# Patient Record
Sex: FEMALE | Race: WHITE | NOT HISPANIC OR LATINO | Employment: OTHER | URBAN - METROPOLITAN AREA
[De-identification: names, ages, dates, MRNs, and addresses within clinical notes are randomized per-mention and may not be internally consistent; named-entity substitution may affect disease eponyms.]

---

## 2022-12-30 ENCOUNTER — APPOINTMENT (OUTPATIENT)
Dept: MRI IMAGING | Facility: CLINIC | Age: 82
DRG: 065 | End: 2022-12-30
Attending: EMERGENCY MEDICINE
Payer: COMMERCIAL

## 2022-12-30 ENCOUNTER — HOSPITAL ENCOUNTER (INPATIENT)
Facility: CLINIC | Age: 82
LOS: 4 days | Discharge: NURSING, REHAB OR LONG-TERM CARE | DRG: 065 | End: 2023-01-03
Attending: EMERGENCY MEDICINE | Admitting: INTERNAL MEDICINE
Payer: COMMERCIAL

## 2022-12-30 DIAGNOSIS — I63.9 CEREBROVASCULAR ACCIDENT (CVA), UNSPECIFIED MECHANISM (H): ICD-10-CM

## 2022-12-30 DIAGNOSIS — E78.5 HYPERLIPIDEMIA LDL GOAL <100: ICD-10-CM

## 2022-12-30 DIAGNOSIS — I10 BENIGN ESSENTIAL HYPERTENSION: Primary | ICD-10-CM

## 2022-12-30 LAB
ANION GAP SERPL CALCULATED.3IONS-SCNC: 7 MMOL/L (ref 3–14)
APTT PPP: 27 SECONDS (ref 22–38)
ATRIAL RATE - MUSE: 84 BPM
BASOPHILS # BLD AUTO: 0.1 10E3/UL (ref 0–0.2)
BASOPHILS NFR BLD AUTO: 1 %
BUN SERPL-MCNC: 19 MG/DL (ref 7–30)
CALCIUM SERPL-MCNC: 9.8 MG/DL (ref 8.5–10.1)
CHLORIDE BLD-SCNC: 104 MMOL/L (ref 94–109)
CHOLEST SERPL-MCNC: 214 MG/DL
CO2 SERPL-SCNC: 24 MMOL/L (ref 20–32)
CREAT SERPL-MCNC: 0.77 MG/DL (ref 0.52–1.04)
DIASTOLIC BLOOD PRESSURE - MUSE: NORMAL MMHG
EOSINOPHIL # BLD AUTO: 0.1 10E3/UL (ref 0–0.7)
EOSINOPHIL NFR BLD AUTO: 2 %
ERYTHROCYTE [DISTWIDTH] IN BLOOD BY AUTOMATED COUNT: 14.2 % (ref 10–15)
GFR SERPL CREATININE-BSD FRML MDRD: 76 ML/MIN/1.73M2
GLUCOSE BLD-MCNC: 118 MG/DL (ref 70–99)
HBA1C MFR BLD: 6 % (ref 0–5.6)
HCT VFR BLD AUTO: 38.2 % (ref 35–47)
HDLC SERPL-MCNC: 93 MG/DL
HGB BLD-MCNC: 13.1 G/DL (ref 11.7–15.7)
IMM GRANULOCYTES # BLD: 0 10E3/UL
IMM GRANULOCYTES NFR BLD: 0 %
INR PPP: 0.89 (ref 0.85–1.15)
INTERPRETATION ECG - MUSE: NORMAL
LDLC SERPL CALC-MCNC: 96 MG/DL
LYMPHOCYTES # BLD AUTO: 1.9 10E3/UL (ref 0.8–5.3)
LYMPHOCYTES NFR BLD AUTO: 26 %
MCH RBC QN AUTO: 29.2 PG (ref 26.5–33)
MCHC RBC AUTO-ENTMCNC: 34.3 G/DL (ref 31.5–36.5)
MCV RBC AUTO: 85 FL (ref 78–100)
MONOCYTES # BLD AUTO: 0.8 10E3/UL (ref 0–1.3)
MONOCYTES NFR BLD AUTO: 11 %
NEUTROPHILS # BLD AUTO: 4.3 10E3/UL (ref 1.6–8.3)
NEUTROPHILS NFR BLD AUTO: 60 %
NONHDLC SERPL-MCNC: 121 MG/DL
NRBC # BLD AUTO: 0 10E3/UL
NRBC BLD AUTO-RTO: 0 /100
P AXIS - MUSE: 63 DEGREES
PLATELET # BLD AUTO: 292 10E3/UL (ref 150–450)
POTASSIUM BLD-SCNC: 4.2 MMOL/L (ref 3.4–5.3)
PR INTERVAL - MUSE: 166 MS
QRS DURATION - MUSE: 84 MS
QT - MUSE: 370 MS
QTC - MUSE: 437 MS
R AXIS - MUSE: 14 DEGREES
RBC # BLD AUTO: 4.48 10E6/UL (ref 3.8–5.2)
SARS-COV-2 RNA RESP QL NAA+PROBE: NEGATIVE
SODIUM SERPL-SCNC: 135 MMOL/L (ref 133–144)
SYSTOLIC BLOOD PRESSURE - MUSE: NORMAL MMHG
T AXIS - MUSE: 77 DEGREES
TRIGL SERPL-MCNC: 127 MG/DL
TROPONIN I SERPL HS-MCNC: 8 NG/L
VENTRICULAR RATE- MUSE: 84 BPM
WBC # BLD AUTO: 7.2 10E3/UL (ref 4–11)

## 2022-12-30 PROCEDURE — 258N000003 HC RX IP 258 OP 636: Performed by: EMERGENCY MEDICINE

## 2022-12-30 PROCEDURE — 96360 HYDRATION IV INFUSION INIT: CPT

## 2022-12-30 PROCEDURE — 255N000002 HC RX 255 OP 636: Performed by: EMERGENCY MEDICINE

## 2022-12-30 PROCEDURE — 83036 HEMOGLOBIN GLYCOSYLATED A1C: CPT | Performed by: NURSE PRACTITIONER

## 2022-12-30 PROCEDURE — 70553 MRI BRAIN STEM W/O & W/DYE: CPT

## 2022-12-30 PROCEDURE — 99221 1ST HOSP IP/OBS SF/LOW 40: CPT | Mod: AI | Performed by: NURSE PRACTITIONER

## 2022-12-30 PROCEDURE — 85610 PROTHROMBIN TIME: CPT | Performed by: EMERGENCY MEDICINE

## 2022-12-30 PROCEDURE — 80061 LIPID PANEL: CPT | Performed by: NURSE PRACTITIONER

## 2022-12-30 PROCEDURE — 70544 MR ANGIOGRAPHY HEAD W/O DYE: CPT

## 2022-12-30 PROCEDURE — U0005 INFEC AGEN DETEC AMPLI PROBE: HCPCS | Performed by: EMERGENCY MEDICINE

## 2022-12-30 PROCEDURE — 93005 ELECTROCARDIOGRAM TRACING: CPT

## 2022-12-30 PROCEDURE — 82310 ASSAY OF CALCIUM: CPT | Performed by: EMERGENCY MEDICINE

## 2022-12-30 PROCEDURE — 85730 THROMBOPLASTIN TIME PARTIAL: CPT | Performed by: EMERGENCY MEDICINE

## 2022-12-30 PROCEDURE — C9803 HOPD COVID-19 SPEC COLLECT: HCPCS

## 2022-12-30 PROCEDURE — 120N000001 HC R&B MED SURG/OB

## 2022-12-30 PROCEDURE — 99285 EMERGENCY DEPT VISIT HI MDM: CPT | Mod: 25

## 2022-12-30 PROCEDURE — 84484 ASSAY OF TROPONIN QUANT: CPT | Performed by: EMERGENCY MEDICINE

## 2022-12-30 PROCEDURE — 85025 COMPLETE CBC W/AUTO DIFF WBC: CPT | Performed by: EMERGENCY MEDICINE

## 2022-12-30 PROCEDURE — 36415 COLL VENOUS BLD VENIPUNCTURE: CPT | Performed by: EMERGENCY MEDICINE

## 2022-12-30 PROCEDURE — A9585 GADOBUTROL INJECTION: HCPCS | Performed by: EMERGENCY MEDICINE

## 2022-12-30 PROCEDURE — 250N000013 HC RX MED GY IP 250 OP 250 PS 637: Performed by: INTERNAL MEDICINE

## 2022-12-30 PROCEDURE — 96361 HYDRATE IV INFUSION ADD-ON: CPT

## 2022-12-30 PROCEDURE — 250N000013 HC RX MED GY IP 250 OP 250 PS 637: Performed by: NURSE PRACTITIONER

## 2022-12-30 PROCEDURE — 70549 MR ANGIOGRAPH NECK W/O&W/DYE: CPT

## 2022-12-30 RX ORDER — LIDOCAINE 40 MG/G
CREAM TOPICAL
Status: DISCONTINUED | OUTPATIENT
Start: 2022-12-30 | End: 2023-01-03 | Stop reason: HOSPADM

## 2022-12-30 RX ORDER — ASPIRIN 81 MG/1
81 TABLET ORAL DAILY
Status: DISCONTINUED | OUTPATIENT
Start: 2022-12-31 | End: 2022-12-30

## 2022-12-30 RX ORDER — GADOBUTROL 604.72 MG/ML
10 INJECTION INTRAVENOUS ONCE
Status: COMPLETED | OUTPATIENT
Start: 2022-12-30 | End: 2022-12-30

## 2022-12-30 RX ORDER — ACETAMINOPHEN 325 MG/1
650 TABLET ORAL EVERY 4 HOURS PRN
Status: DISCONTINUED | OUTPATIENT
Start: 2022-12-30 | End: 2023-01-03 | Stop reason: HOSPADM

## 2022-12-30 RX ORDER — ONDANSETRON 2 MG/ML
4 INJECTION INTRAMUSCULAR; INTRAVENOUS EVERY 6 HOURS PRN
Status: DISCONTINUED | OUTPATIENT
Start: 2022-12-30 | End: 2023-01-03 | Stop reason: HOSPADM

## 2022-12-30 RX ORDER — CLOPIDOGREL BISULFATE 75 MG/1
75 TABLET ORAL DAILY
Status: DISCONTINUED | OUTPATIENT
Start: 2022-12-31 | End: 2022-12-30

## 2022-12-30 RX ORDER — ASPIRIN 81 MG/1
324 TABLET, CHEWABLE ORAL ONCE
Status: DISCONTINUED | OUTPATIENT
Start: 2022-12-30 | End: 2022-12-30

## 2022-12-30 RX ORDER — MULTIVIT WITH MINERALS/LUTEIN
1 TABLET ORAL DAILY
COMMUNITY

## 2022-12-30 RX ORDER — ONDANSETRON 4 MG/1
4 TABLET, ORALLY DISINTEGRATING ORAL EVERY 6 HOURS PRN
Status: DISCONTINUED | OUTPATIENT
Start: 2022-12-30 | End: 2023-01-03 | Stop reason: HOSPADM

## 2022-12-30 RX ORDER — ASPIRIN 81 MG/1
81 TABLET, CHEWABLE ORAL DAILY
Status: ON HOLD | COMMUNITY
End: 2023-01-12

## 2022-12-30 RX ORDER — CLOPIDOGREL BISULFATE 75 MG/1
75 TABLET ORAL DAILY
Status: DISCONTINUED | OUTPATIENT
Start: 2022-12-31 | End: 2023-01-03 | Stop reason: HOSPADM

## 2022-12-30 RX ORDER — PROCHLORPERAZINE 25 MG
12.5 SUPPOSITORY, RECTAL RECTAL EVERY 12 HOURS PRN
Status: DISCONTINUED | OUTPATIENT
Start: 2022-12-30 | End: 2023-01-03 | Stop reason: HOSPADM

## 2022-12-30 RX ORDER — ATORVASTATIN CALCIUM 40 MG/1
40 TABLET, FILM COATED ORAL EVERY EVENING
Status: DISCONTINUED | OUTPATIENT
Start: 2022-12-30 | End: 2022-12-30

## 2022-12-30 RX ORDER — ATORVASTATIN CALCIUM 40 MG/1
40 TABLET, FILM COATED ORAL EVERY EVENING
Status: DISCONTINUED | OUTPATIENT
Start: 2022-12-30 | End: 2022-12-31

## 2022-12-30 RX ORDER — ACETAMINOPHEN 325 MG/1
650 TABLET ORAL EVERY 4 HOURS PRN
Status: DISCONTINUED | OUTPATIENT
Start: 2022-12-30 | End: 2022-12-30

## 2022-12-30 RX ORDER — CHOLECALCIFEROL (VITAMIN D3) 50 MCG
1 TABLET ORAL DAILY
COMMUNITY

## 2022-12-30 RX ORDER — PROCHLORPERAZINE MALEATE 5 MG
5 TABLET ORAL EVERY 6 HOURS PRN
Status: DISCONTINUED | OUTPATIENT
Start: 2022-12-30 | End: 2023-01-03 | Stop reason: HOSPADM

## 2022-12-30 RX ORDER — CLOPIDOGREL 300 MG/1
300 TABLET, FILM COATED ORAL ONCE
Status: COMPLETED | OUTPATIENT
Start: 2022-12-30 | End: 2022-12-30

## 2022-12-30 RX ORDER — ATORVASTATIN CALCIUM 40 MG/1
40 TABLET, FILM COATED ORAL DAILY
Status: ON HOLD | COMMUNITY
End: 2023-01-03

## 2022-12-30 RX ORDER — VIT C/E/ZN/COPPR/LUTEIN/ZEAXAN 60 MG-6 MG
1 CAPSULE ORAL DAILY
COMMUNITY
End: 2023-04-10

## 2022-12-30 RX ORDER — ASPIRIN 81 MG/1
81 TABLET, CHEWABLE ORAL DAILY
Status: DISCONTINUED | OUTPATIENT
Start: 2022-12-31 | End: 2023-01-03 | Stop reason: HOSPADM

## 2022-12-30 RX ADMIN — CLOPIDOGREL BISULFATE 300 MG: 300 TABLET, FILM COATED ORAL at 16:27

## 2022-12-30 RX ADMIN — SODIUM CHLORIDE 1000 ML: 9 INJECTION, SOLUTION INTRAVENOUS at 11:04

## 2022-12-30 RX ADMIN — GADOBUTROL 10 ML: 604.72 INJECTION INTRAVENOUS at 13:52

## 2022-12-30 RX ADMIN — ATORVASTATIN CALCIUM 40 MG: 40 TABLET, FILM COATED ORAL at 20:55

## 2022-12-30 ASSESSMENT — ACTIVITIES OF DAILY LIVING (ADL)
ADLS_ACUITY_SCORE: 35
ADLS_ACUITY_SCORE: 33
ADLS_ACUITY_SCORE: 35

## 2022-12-30 ASSESSMENT — ENCOUNTER SYMPTOMS
SPEECH DIFFICULTY: 0
WEAKNESS: 1

## 2022-12-30 NOTE — CONSULTS
[facility]    Stroke Consult Note    Reason for Consult:  TIA    Chief Complaint: Extremity Weakness       HPI  Inga Coats is a 82 year old female presented with transient right hand weakness, noted to also be dragging right foot while walking. Symptoms lasted over an hour. She feels back to baseline now. She has a history of cardiac risk factors with CAD s/p CABG, HLD and prior TIAs.   Onset 810    TIA Evaluation Summarized    MRI and/or Head CT                                                                  IMPRESSION:    1. Subtle restricted diffusion in the left corona radiata which could  represent an acute to subacute infarct. No evidence of hemorrhagic  transformation of infarct. No mass effect or midline shift.  2. Moderate diffuse parenchymal volume loss and marked white matter  changes most likely due to chronic microvascular ischemic disease.    Intracranial Vasculature IMPRESSION:    1. No definite vascular cutoff of the proximal major intracranial  arteries.  2. Apparent moderate stenosis at the proximal left M2 branches   Cervical Vasculature                                                                  IMPRESSION:    1. Patent arteries in the neck without evidence of dissection.  2. Apparent atherosclerotic disease of the right carotid bifurcation  and proximal internal carotid artery which appears to cause  approximately 50% stenosis by NASCET criteria     Echocardiogram pending   EKG/Telemetry SR   Other Testing Not Applicable      LDL No lab value available in past 30 days   A1C No lab value available in past 90 days       ABCD2 Patients Score   Age ? 60 years 1 point 1   Blood Pressure     -SBP ? 140 or DBP ?  90    1 point 1   Clinical Features    -Unilateral weakness   -Speech disturbance w/o weakness    -Other    2 points  1 point    0 points 2   Duration of symptoms    ?60 minutes    10-59 minutes    <10 minutes   2 points  1 point  0 points 2  "  Diabetes  1 point 0   Patient s ABCD2 Score (0-7) = 6       Impression  Transient ischemic attack- has vascular risk factors with CAD s/p CABG, Aortic stenosis s/p AVR, HLD and prior TIA who presented with transient right hand and foot weakness, now resolved. She has a subacute left corona radiata stroke on MRI and MRA notes some left MCA stenosis that would be consistent with ICAD.     Recommendations  - Neurochecks and Vital Signs every Q4H   - Daily aspirin 81 mg for secondary stroke prevention  - Plavix (clopidogrel) 300 mg PO loading dose x 1  - Plavix (clopidogrel) 75 mg PO Daily  - Statin: Lipitor 40mg, titrate pending on LDL   - TTE (with Bubble Study if age 60 yrs or less)  - 24-hour Telemetry  - Bedside Glucose Monitoring  - A1c, Lipid Panel  - PT/OT/SLP  - Stroke Education  - Euthermia, Euglycemia   - DAPT 90 days    Patient Follow-up    - final recommendation pending work-up  - in 6-8 weeks with any stroke KRISTEN ([phone])    Thank you for this consult. We will continue to follow.     The Stroke Staff is Dr. Carbajal  .    Kaur Alvarenga MD  Vascular Neurology Fellow    To page me or covering stroke neurology team member, click here: [facility]COM  Choose \"On Call\" tab at top, then select \"NEUROLOGY/ALL SITES\" from middle drop-down box, press Enter, then look for \"stroke\" or \"telestroke\" for your site.    _____________________________________________________    Clinically Significant Risk Factors Present on Admission         # Hyponatremia: Lowest Na = 135 mmol/L in last 2 days, will monitor as appropriate                          Past Medical History   History reviewed. No pertinent past medical history.  Past Surgical History   Past Surgical History:   Procedure Laterality Date     CARDIAC SURGERY       Medications   Home Meds  Prior to Admission medications    Medication Sig Start Date End Date Taking? Authorizing Provider   simvastatin (ZOCOR) 40 MG tablet Take 1 tablet by mouth At Bedtime. " 6/28/12   Joe Ramires MD       Scheduled Meds      Infusion Meds      PRN Meds      Allergies   Allergies   Allergen Reactions     Shellfish-Derived Products Anaphylaxis     Family History   History reviewed. No pertinent family history.  Social History   Social History     Tobacco Use     Smoking status: Never     Smokeless tobacco: Never   Substance Use Topics     Alcohol use: Not Currently     Drug use: Never       Review of Systems   Review of systems not obtained due to patient factors - critical condition       PHYSICAL EXAMINATION   Temp:  [98.6  F (37  C)] 98.6  F (37  C)  Pulse:  [87] 87  Resp:  [18] 18  BP: (184)/(85) 184/85  SpO2:  [100 %] 100 %    Neurologic  Mental Status:  alert, oriented x 3, follows commands, speech clear and fluent, naming and repetition normal  Cranial Nerves:  visual fields intact, EOMI with normal smooth pursuit, facial movements symmetric, hearing not formally tested but intact to conversation, palate elevation symmetric and uvula midline, no dysarthria, shoulder shrug strong bilaterally, tongue protrusion midline  Motor:  normal muscle tone and bulk, no abnormal movements, able to move all limbs spontaneously, strength 5/5 throughout upper and lower extremities, no pronator drift  Reflexes:  toes down-going  Sensory:  light touch sensation intact and symmetric throughout upper and lower extremities, no extinction on double simultaneous stimulation   Coordination:  normal finger-to-nose and heel-to-shin bilaterally without dysmetria, rapid alternating movements symmetric  Station/Gait:  deferred    Dysphagia Screen  Per Nursing    Stroke Scales    NIHSS  1a. Level of Consciousness  0   1b. LOC Questions  0   1c. LOC Commands  0   2.   Best Gaze  0   3.   Visual  0   4.   Facial Palsy  0   5a. Motor Arm, Left  0   5b. Motor Arm, Right  0   6a. Motor Leg, Left  0   6b. Motor Leg, right  0   7.   Limb Ataxia  0   8.   Sensory  0   9.   Best Language  0   10.  Dysarthria  0   11. Extinction and Inattention   0   Total  0       Modified Billings Score (Pre-morbid)    -       Imaging  I personally reviewed all imaging; relevant findings per HPI.    Labs Data   CBC  Recent Labs   Lab 12/30/22  1103   WBC 7.2   RBC 4.48   HGB 13.1   HCT 38.2        Basic Metabolic Panel   Recent Labs   Lab 12/30/22  1103      POTASSIUM 4.2   CHLORIDE 104     Liver Panel  No results for input(s): PROTTOTAL, ALBUMIN, BILITOTAL, ALKPHOS, AST, ALT, BILIDIRECT in the last 168 hours.  INR    Recent Labs   Lab Test 12/30/22  1103   INR 0.89      Lipid Profile  No lab results found.  A1C  No lab results found.  Troponin  No results for input(s): CTROPT, TROPONINIS, TROPONINI, GHTROP in the last 168 hours.       Stroke Consult Data Data   This was a non-emergent, non-telestroke consult.

## 2022-12-30 NOTE — ED TRIAGE NOTES
"Pt notes was up at 0400 and was able to do ADLs. Pt noted at approx 0820 rt hand was \"slow to work and when held arm in front it would drop.\" pt Befast negative in triage.      Triage Assessment     Row Name 12/30/22 4786       Triage Assessment (Adult)    Airway WDL WDL       Respiratory WDL    Respiratory WDL WDL       Skin Circulation/Temperature WDL    Skin Circulation/Temperature WDL WDL       Cardiac WDL    Cardiac WDL X  HTN       Peripheral/Neurovascular WDL    Peripheral Neurovascular WDL WDL       Cognitive/Neuro/Behavioral WDL    Cognitive/Neuro/Behavioral WDL X  pt noted rt arm weakness at 0820. at baseline at this time    Orientation oriented x 4       Seamus Coma Scale    Best Eye Response 4-->(E4) spontaneous    Best Motor Response 6-->(M6) obeys commands    Best Verbal Response 5-->(V5) oriented    Seamus Coma Scale Score 15              "

## 2022-12-30 NOTE — ED PROVIDER NOTES
History   Chief Complaint:  Extremity Weakness       The history is provided by the patient.      Inga Coats is a 82 year old female with history of transient cerebral ischemia, coronary artery disease, and aortic valve replacement who presents with right arm/hand weakness. The patient reports feeling fine when she woke up at 0430 this morning, noting that she watched television, made her bed, ate breakfast, and then played [employer] on her phone. While still on her phone around 0810 the patient noticed that her right fingers were not working as well and seemed weak. She says she could work her fingers, but they were slow and changed from baseline. She also notes that she was able to lift the right arm, but then it would fall immediately. Her daughter arrived at 0900 and symptoms were improving. She says the patient was able to get dressed while standing and walk, but notes that her right foot seemed to shuffle more on the ground. The patient states that her full arm weakness is now resolved, but says writing her name still looks slightly different. No speech difficulty. She denies history of stroke, but notes family history. The patient had an aortic valve replacement about 10 years ago. She has not had a recent echo. She has history of hyperlipidemia, but denies hypertension and diabetes mellitus. The patient has never smoked. She reports taking daily baby aspirin.     Review of Systems   Neurological: Positive for weakness. Negative for speech difficulty.   All other systems reviewed and are negative.    Allergies:  Shellfish-Derived Products    Medications:  Atorvastatin  Nitroglycerin  Aspirin 81 mg  Glucosamine HCL    Past Medical History:     Transient cerebral ischemia  Coronary artery disease  Severe aortic stenosis  Hyperlipidemia  CKD stage 3    Past Surgical History:    CABG x3  Aortic valve replacement    Family History:    Heart disease    Social History:  The patient presents to the ED  "with her daughter   Denies any smoking history   PCP: Joe Ramires     Physical Exam     Patient Vitals for the past 24 hrs:   BP Temp Temp src Pulse Resp SpO2 Height Weight   12/30/22 1430 (!) 162/85 -- -- 75 -- -- -- --   12/30/22 1358 (!) 185/87 -- -- -- -- 94 % -- --   12/30/22 1213 (!) 155/72 -- -- 74 -- -- -- --   12/30/22 1113 137/85 -- -- -- -- 95 % -- --   12/30/22 0944 (!) 184/85 98.6  F (37  C) Temporal 87 18 100 % 1.651 m (5' 5\") 61.2 kg (135 lb)       Physical Exam  GENERAL: well developed, pleasant  HEAD: atraumatic  EYES: pupils reactive, extraocular muscles intact, conjunctivae normal  ENT:  mucus membranes moist  NECK:  trachea midline, normal range of motion  RESPIRATORY: no tachypnea, breath sounds clear to auscultation   CVS: normal S1/S2, no murmurs, intact distal pulses  ABDOMEN: soft, nontender, nondistention  MUSCULOSKELETAL: no deformities  SKIN: warm and dry, no acute rashes or ulceration  NEURO: GCS 15, cranial nerves intact, alert and oriented x3, normal gait, normal finger to nose, normal strength  PSYCH:  Mood/affect normal    Emergency Department Course   ECG taken at 1122, read at 1130   ECG results from 12/30/22   EKG 12-lead, tracing only     Value    Systolic Blood Pressure     Diastolic Blood Pressure     Ventricular Rate 84    Atrial Rate 84    TN Interval 166    QRS Duration 84        QTc 437    P Axis 63    R AXIS 14    T Axis 77    Interpretation ECG      Sinus rhythm  Nonspecific ST and T wave abnormality  Abnormal ECG  No previous ECGs available       Imaging:  MRA Angiogram Neck w/o & w Contrast   Preliminary Result   IMPRESSION:     1. Patent arteries in the neck without evidence of dissection.   2. Apparent atherosclerotic disease of the right carotid bifurcation   and proximal internal carotid artery which appears to cause   approximately 50% stenosis by NASCET criteria.      MR Brain w/o & w Contrast   Preliminary Result   IMPRESSION:     1. Subtle " restricted diffusion in the left corona radiata which could   represent an acute to subacute infarct. No evidence of hemorrhagic   transformation of infarct. No mass effect or midline shift.   2. Moderate diffuse parenchymal volume loss and marked white matter   changes most likely due to chronic microvascular ischemic disease.          MR Head w/o Contrast Angiogram   Preliminary Result   IMPRESSION:     1. No definite vascular cutoff of the proximal major intracranial   arteries.   2. Apparent moderate stenosis at the proximal left M2 branches.          Report per radiology      Laboratory:  Labs Ordered and Resulted from Time of ED Arrival to Time of ED Departure   BASIC METABOLIC PANEL - Abnormal       Result Value    Sodium 135      Potassium 4.2      Chloride 104      Carbon Dioxide (CO2) 24      Anion Gap 7      Urea Nitrogen 19      Creatinine 0.77      Calcium 9.8      Glucose 118 (*)     GFR Estimate 76     INR - Normal    INR 0.89     PARTIAL THROMBOPLASTIN TIME - Normal    aPTT 27     TROPONIN I - Normal    Troponin I High Sensitivity 8     CBC WITH PLATELETS AND DIFFERENTIAL    WBC Count 7.2      RBC Count 4.48      Hemoglobin 13.1      Hematocrit 38.2      MCV 85      MCH 29.2      MCHC 34.3      RDW 14.2      Platelet Count 292      % Neutrophils 60      % Lymphocytes 26      % Monocytes 11      % Eosinophils 2      % Basophils 1      % Immature Granulocytes 0      NRBCs per 100 WBC 0      Absolute Neutrophils 4.3      Absolute Lymphocytes 1.9      Absolute Monocytes 0.8      Absolute Eosinophils 0.1      Absolute Basophils 0.1      Absolute Immature Granulocytes 0.0      Absolute NRBCs 0.0     COVID-19 VIRUS (CORONAVIRUS) BY PCR   HEMOGLOBIN A1C        Emergency Department Course:     Reviewed:  I reviewed nursing notes, vitals, past medical history and Care Everywhere    Assessments:  1035 I obtained history and examined the patient as noted above.   1141 I rechecked the patient at this time.    1415 I rechecked the patient and explained findings.     Consults:  1109 I consulted with stroke neurology regarding the patient's presentation in the emergency department.    1438 I spoke with Dr. Alvarenga of stroke neurology regarding the patient and plan.   1449 I consulted with Dr. Walker of the hospitalist team regarding the patient, who accepted the patient for admission.      Interventions:  1104 NS 1 L IV    aspirin 324 mg PO    Disposition:  The patient was admitted to the hospital under the care of Dr. Walker.     Impression & Plan     Medical Decision Making:    Patient presents with an episode of weakness to the right arm as well as parent slight dragging of the right foot started this morning per her report around 8 AM.  Symptoms subsided about an hour later.  I am unable to detect any stroke symptoms on exam and is able to sign her name with fairly clear cursive handwriting.  Her gait was normal and no other acute findings on exam.  MRI/MRA was obtained showing subtle findings as above.  Patient is currently on baby aspirin.  Did speak with stroke neurology at the beginning and end of the case.  Patient was given full dose aspirin and Plavix is currently being contemplated.    Critical Care Time: was 0 minutes for this patient excluding procedures    Diagnosis:    ICD-10-CM    1. Cerebrovascular accident (CVA), unspecified mechanism (H)  I63.9         Scribe Disclosure:  I, Cherie Hurtado, am serving as a scribe at 10:33 AM on 12/30/2022 to document services personally performed by Aryan Lopez MD based on my observations and the provider's statements to me.        Aryan Lopez MD  12/30/22 [id number]

## 2022-12-30 NOTE — ED NOTES
"[facility]  ED Nurse Handoff Report    ED Chief complaint: Extremity Weakness      ED Diagnosis:   Final diagnoses:   Cerebrovascular accident (CVA), unspecified mechanism (H)       Code Status: To be discussed inpatient    Allergies:   Allergies   Allergen Reactions     Shellfish-Derived Products Anaphylaxis       Patient Story: Patient noted she woke at 0400 and was able to do ADLs. Then noted at approx 0820 right hand was \"slow to work and when held arm in front it would drop.\" EMS was called. Upon arrival, symptoms had subsided.   Focused Assessment:  Neuros intact    Treatments and/or interventions provided: MRI  Patient's response to treatments and/or interventions:     To be done/followed up on inpatient unit:  Inpatient orders    Does this patient have any cognitive concerns?: None    Activity level - Baseline/Home:  Independent  Activity Level - Current:   Stand with Assist    Patient's Preferred language: English   Needed?: No    Isolation: None  Infection: Not Applicable  Patient tested for COVID 19 prior to admission: NO  Bariatric?: No    Vital Signs:   Vitals:    12/30/22 1113 12/30/22 1213 12/30/22 1358 12/30/22 1430   BP: 137/85 (!) 155/72 (!) 185/87 (!) 162/85   Pulse:  74  75   Resp:       Temp:       TempSrc:       SpO2: 95%  94%    Weight:       Height:         For the majority of the shift this patient's behavior was Green.   Behavioral interventions performed were N/A.    ED NURSE PHONE NUMBER: *[phone]         "

## 2022-12-30 NOTE — H&P
[facility]    History and Physical - Hospitalist Service       Date of Admission:  12/30/2022    Assessment & Plan      Inga Coats is a 82 year old female w/ PMH of of CAD status post three-vessel CABG, aortic stenosis requiring aortic valve replacement, hyperlipidemia, TIA, CKD 3 admitted on 12/30/2022 for TIA and subacute stroke.     TIA with right hand discoordination, right upper extremity weakness and possible shuffling gait on the right lower extremity now resolved.  Subacute left corona radiata stroke.  MRI and MRA with left MCA stenosis at proximal M2  -Consult stroke neurology, appreciate their recommendations  -- Neurochecks and Vital Signs every Q4H   - Daily aspirin 81 mg starting 12/31/22  - Plavix (clopidogrel) 300 mg PO loading dose x 1 given in ED  - Plavix (clopidogrel) 75 mg PO Daily starting 12/31/22  -planning for 90 days DAPT as per stroke neuro  - resume PTA Lipitor 40mg   - TTE  - 24-hour Telemetry  - Bedside Glucose Monitoring  - A1c--> consistent w/ prediabetes  -Lipid Panel pending; in Nov 2022 total chol 196, , HLD 65, LDL 99  - PT/OT/SLP  - Stroke Education     CAD status post three-vessel CABG  aortic stenosis s/p aortic valve replacement  -antiplatelet and statin therapy as above    Hyperlipidemia  -statin as above    CKD 3  -baseline Cr 1.05    Hyperglycemia w/ prediabetes, newly identified this admission, A1c 6%  -OP follow up       Diet: NPO for Medical/Clinical Reasons Except for: No Exceptions    DVT Prophylaxis: Pneumatic Compression Devices  Mc Catheter: Not present  Central Lines: None  Cardiac Monitoring: None  Code Status:   full code; reviewed health care directive on dght's phone in presence of pt and dght; if pt's in a terminal state, she would not want life sustaining treatment.    Clinically Significant Risk Factors Present on Admission         # Hyponatremia: Lowest Na = 135 mmol/L in last 2 days, will monitor as appropriate                    -Na is normal    Disposition Plan      Expected Discharge Date: 01/01/2023                The patient's care was discussed with the Attending Physician, Dr. Walker, Bedside Nurse, Patient and Patient's Family.    Total time was 45 minutes of which 35 minutes was face to face time explaining rationale for staying in hospital,  interviewing and examining pt, and outlining the above plan of care    DOMENICA [facility]  Hospitalist Service  [facility]  Securely message with the Vocera Web Console (learn more here)  Text page via [employer] Paging/Directory     ______________________________________________________________________    Chief Complaint   Right hand discoordination    History is obtained from the patient and EMR    History of Present Illness   Inga Coats is a 82 year old female With past medical history of CAD status post three-vessel CABG, aortic stenosis requiring aortic valve replacement, hyperlipidemia, TIA, CKD 3 who woke up at 4:30 AM 12/30/22, was in her usual state of health, performed her regular ADLs.  At 810-8:20 AM while playing solitaire on her phone she noticed that her fingers were not moving as quickly as they normally do.  Additionally when she tried to hold up her right arm she was unable to do so and the arm fell down to her side.  Patient called one of her children who arrived by 9 AM and symptoms had improved by that time her child arrived and the patient was able to ambulate and dress independently.  Because of these symptoms she sought treatment at [facility] ED.    In the ED patient was hypertensive BPs 160-180s/70s-80s.  She underwent a series of MRI imaging which showed subtle abnormality of the corona radiata and moderate stenosis of the proximal M2.  MRA of the neck was negative for any dissection but did show atherosclerotic disease of the right carotid bifurcation.  She was given 1 L of normal saline and seen by the stroke  neurology service who have diagnosed her with a TIA and subacute left corona radiata stroke.  Hospitalist service has been asked to register the patient observation for further evaluation and management    Patient denies any changes in her speech, she did not look in the mirror to notice any facial abnormalities.  She denied any paresthesias of her right upper extremity or right lower extremity.  She denies any pertinent positives on 10 point review of systems.  She was quite hypertensive 160s-190s during my exam.  SBP's were in the 130s at her annual medic care physical exam in November 2022.    Review of Systems    A complete 10 point ROS was obtained, see above for pertinent positives or negatives, all other systems are negative    Past Medical History    I have reviewed this patient's medical history and updated it with pertinent information if needed.   History reviewed. No pertinent past medical history.  Mixed hyperlipidemia E78.2        CAD (coronary artery disease)     S/P CABG x 3     S/P AVR (aortic valve replacement)  For Severe aortic stenosis I35.0     ACP (advance care planning)     Transient cerebral ischemia    Past Surgical History   I have reviewed this patient's surgical history and updated it with pertinent information if needed.  Past Surgical History:   Procedure Laterality Date     CARDIAC SURGERY         Social History   I have reviewed this patient's social history and updated it with pertinent information if needed.  Social History     Tobacco Use     Smoking status: Never     Smokeless tobacco: Never   Substance Use Topics     Alcohol use: Not Currently     Drug use: Never       Family History   4 adult children, 1 with prediabetes 3 others alive and well    Prior to Admission Medications   Prior to Admission Medications   Prescriptions Last Dose Informant Patient Reported? Taking?   simvastatin (ZOCOR) 40 MG tablet   No No   Sig: Take 1 tablet by mouth At Bedtime.       Facility-Administered Medications: None   asa 81mg daily    Allergies   Allergies   Allergen Reactions     Shellfish-Derived Products Anaphylaxis       Physical Exam   Vital Signs: Temp: 98.6  F (37  C) Temp src: Temporal BP: (!) 162/85 Pulse: 75   Resp: 18 SpO2: 94 %      Weight: 135 lbs 0 oz    Constitutional: vs as per EMR /71  General:  adult pt lying in bed without acute distress  Neuro: +follows commands wiggle toes and show 2 fingers bilat, face symmetric, tongue midline, speech fluent, see NIHSS below  Eyes pupils equal round 3mm briskly reactive bilat, sclera nonicteric, noninjected, conjunctiva pink,  Head, ENT & mouth: NC/AT,  mouth moist oral mucosa  Neck: supple  CV S1S2, +murmur  resp: CTAB upper lobes  gi:normoactive bowel sounds, soft, nontender, nondisteded  Ext: no edema or mottling  Skin: no rashes on exposed skin  Lymph: defer  Musculoskeletal no bony joint deformities    National Institutes of Health Stroke Scale     Score    Level of consciousness: (0)   Alert, keenly responsive    LOC questions: (0)   Answers both questions correctly    LOC commands: (0)   Performs both tasks correctly    Best gaze: (0)   Normal    Visual: (0)   No visual loss    Facial palsy: (0)   Normal symmetrical movements    Motor arm (left): (0)   No drift    Motor arm (right): (0)   No drift    Motor leg (left): (0)   No drift    Motor leg (right): (0)   No drift    Limb ataxia: (0)   Absent     Sensory: (0)   Normal- no sensory loss    Best language: (0)   Normal- no aphasia    Dysarthria: (0)   Normal    Extinction and inattention: (0)   No abnormality        Total Score:  0       Data   Data reviewed today: I reviewed all medications, new labs and imaging results over the last 24 hours. I personally reviewed the EKG tracing showing NSR, normal axis, Q in III, nonspecific TW change in V2.    Recent Labs   Lab 12/30/22  1103   WBC 7.2   HGB 13.1   MCV 85      INR 0.89      POTASSIUM 4.2   CHLORIDE  104   CO2 24   BUN 19   CR 0.77   ANIONGAP 7   JAC 9.8   *     Recent Results (from the past 24 hour(s))   MR Head w/o Contrast Angiogram    Narrative    MR ANGIOGRAM OF THE HEAD WITHOUT CONTRAST December 30, 2022 1:23 PM     HISTORY: Stroke, transient ischemic attack.    TECHNIQUE: 3D time-of-flight MR angiogram of the head without  contrast.    COMPARISON: None.    FINDINGS: The major intracranial arteries including the proximal  branches of the anterior cerebral, middle cerebral, and posterior  cerebral arteries appear patent without vascular cutoff. No aneurysm  identified. Apparent moderate stenosis at the proximal left M2  branches.       Impression    IMPRESSION:    1. No definite vascular cutoff of the proximal major intracranial  arteries.  2. Apparent moderate stenosis at the proximal left M2 branches.       ZULEYMA QUARLES MD         SYSTEM ID:  [id number]   MR Brain w/o & w Contrast    Narrative    MRI BRAIN WITHOUT AND WITH CONTRAST  12/30/2022 1:50 PM     HISTORY: TIA, right arm and leg weakness.    TECHNIQUE:  Multiplanar, multisequence MRI of the brain without and  with 10 mL Gadavist.     COMPARISON: None.     FINDINGS: Subtle area of restricted diffusion in the left corona  radiata (series 5 image 65) without definitive low signal on ADC. Mild  T2 hyperintensity in this area. No corresponding susceptibility  hypointensity to suggest hemorrhage in this area. No mass effect or  midline shift. No other areas of restricted diffusion. Moderate  diffuse parenchymal volume loss. Marked periventricular white matter  T2 hyperintensities which are nonspecific, but most likely related to  chronic microvascular ischemic disease.    No abnormal intracranial enhancement.     The facial structures appear normal.       Impression    IMPRESSION:    1. Subtle restricted diffusion in the left corona radiata which could  represent an acute to subacute infarct. No evidence of hemorrhagic  transformation of infarct.  No mass effect or midline shift.  2. Moderate diffuse parenchymal volume loss and marked white matter  changes most likely due to chronic microvascular ischemic disease.       ZULEYMA QUARLES MD         SYSTEM ID:  [id number]   MRA Angiogram Neck w/o & w Contrast    Narrative    MRA NECK WITHOUT AND WITH CONTRAST  12/30/2022 1:53 PM     HISTORY: Stroke, TIA. Right hand and leg weakness.    TECHNIQUE: 2D time-of-flight MR angiogram of the neck without contrast  and 3D MR angiogram of the neck with 10 mL Gadavist. Estimates of  carotid stenoses are made relative to the distal internal carotid  artery diameters except as noted.     COMPARISON: None.     FINDINGS:    Normal origin of the great vessels from the aortic arch.     Right carotid artery: The right common and internal carotid arteries  are patent. Mild luminal irregularity at the right carotid bifurcation  likely due to atherosclerotic disease which appears to cause  approximately 50% stenosis by NASCET criteria.     Left carotid artery: The left common and internal carotid arteries are  patent. No significant stenosis.     Vertebral arteries: Vertebral arteries appear patent without evidence  of dissection. No significant stenosis.       Impression    IMPRESSION:    1. Patent arteries in the neck without evidence of dissection.  2. Apparent atherosclerotic disease of the right carotid bifurcation  and proximal internal carotid artery which appears to cause  approximately 50% stenosis by NASCET criteria.    ZULEYMA QUARLES MD         SYSTEM ID:  [id number]

## 2022-12-30 NOTE — PHARMACY-ADMISSION MEDICATION HISTORY
Pharmacy Medication History  Admission medication history interview status for the 12/30/2022  admission is complete. See EPIC admission navigator for prior to admission medications     Location of Interview: Patient room  Medication history sources: Patient, Patient's family/friend (daughter), Surescripts and home medication bottles    Significant changes made to the medication list:  1. Added all meds to list  2. Removed simvastatin    In the past week, patient estimated taking medication this percent of the time: greater than 90%    Medication reconciliation completed by provider prior to medication history? No    Time spent in this activity: 20 min    Prior to Admission medications    Medication Sig Last Dose Taking? Auth Provider Long Term End Date   Apoaequorin (PREVAGEN) 10 MG CAPS Take 1 capsule by mouth daily 12/30/2022 Yes Unknown, Entered By History     aspirin (ASA) 81 MG chewable tablet Take 81 mg by mouth daily 12/30/2022 at AM Yes Unknown, Entered By History     atorvastatin (LIPITOR) 40 MG tablet Take 40 mg by mouth daily 12/29/2022 at HS Yes Unknown, Entered By History Yes    Misc Natural Products (OSTEO BI-FLEX ADV TRIPLE ST PO) Take 2 capsules by mouth daily 12/30/2022 Yes Unknown, Entered By History     multivitamin  with lutein (OCUVITE WITH LUTEIN) CAPS per capsule Take 1 capsule by mouth daily 12/30/2022 Yes Unknown, Entered By History     multivitamin (CENTRUM SILVER) tablet Take 1 tablet by mouth daily 12/30/2022 Yes Unknown, Entered By History     vitamin D3 (CHOLECALCIFEROL) 50 mcg (2000 units) tablet Take 1 tablet by mouth daily 12/30/2022 Yes Unknown, Entered By History         The information provided in this note is only as accurate as the sources available at the time of update(s)

## 2022-12-31 ENCOUNTER — APPOINTMENT (OUTPATIENT)
Dept: CT IMAGING | Facility: CLINIC | Age: 82
DRG: 065 | End: 2022-12-31
Payer: COMMERCIAL

## 2022-12-31 ENCOUNTER — APPOINTMENT (OUTPATIENT)
Dept: CARDIOLOGY | Facility: CLINIC | Age: 82
DRG: 065 | End: 2022-12-31
Attending: NURSE PRACTITIONER
Payer: COMMERCIAL

## 2022-12-31 ENCOUNTER — APPOINTMENT (OUTPATIENT)
Dept: OCCUPATIONAL THERAPY | Facility: CLINIC | Age: 82
DRG: 065 | End: 2022-12-31
Attending: NURSE PRACTITIONER
Payer: COMMERCIAL

## 2022-12-31 ENCOUNTER — APPOINTMENT (OUTPATIENT)
Dept: PHYSICAL THERAPY | Facility: CLINIC | Age: 82
DRG: 065 | End: 2022-12-31
Attending: NURSE PRACTITIONER
Payer: COMMERCIAL

## 2022-12-31 LAB
ANION GAP SERPL CALCULATED.3IONS-SCNC: 8 MMOL/L (ref 3–14)
BUN SERPL-MCNC: 18 MG/DL (ref 7–30)
CALCIUM SERPL-MCNC: 9.4 MG/DL (ref 8.5–10.1)
CHLORIDE BLD-SCNC: 106 MMOL/L (ref 94–109)
CO2 SERPL-SCNC: 23 MMOL/L (ref 20–32)
CREAT SERPL-MCNC: 0.8 MG/DL (ref 0.52–1.04)
ERYTHROCYTE [DISTWIDTH] IN BLOOD BY AUTOMATED COUNT: 14.2 % (ref 10–15)
GFR SERPL CREATININE-BSD FRML MDRD: 72 ML/MIN/1.73M2
GLUCOSE BLD-MCNC: 113 MG/DL (ref 70–99)
GLUCOSE BLDC GLUCOMTR-MCNC: 111 MG/DL (ref 70–99)
GLUCOSE BLDC GLUCOMTR-MCNC: 117 MG/DL (ref 70–99)
GLUCOSE BLDC GLUCOMTR-MCNC: 129 MG/DL (ref 70–99)
HCT VFR BLD AUTO: 37.7 % (ref 35–47)
HGB BLD-MCNC: 12.6 G/DL (ref 11.7–15.7)
LVEF ECHO: NORMAL
MCH RBC QN AUTO: 28.4 PG (ref 26.5–33)
MCHC RBC AUTO-ENTMCNC: 33.4 G/DL (ref 31.5–36.5)
MCV RBC AUTO: 85 FL (ref 78–100)
PLATELET # BLD AUTO: 288 10E3/UL (ref 150–450)
POTASSIUM BLD-SCNC: 4.1 MMOL/L (ref 3.4–5.3)
RBC # BLD AUTO: 4.43 10E6/UL (ref 3.8–5.2)
SODIUM SERPL-SCNC: 137 MMOL/L (ref 133–144)
WBC # BLD AUTO: 6.4 10E3/UL (ref 4–11)

## 2022-12-31 PROCEDURE — 250N000013 HC RX MED GY IP 250 OP 250 PS 637: Performed by: INTERNAL MEDICINE

## 2022-12-31 PROCEDURE — 250N000009 HC RX 250: Performed by: INTERNAL MEDICINE

## 2022-12-31 PROCEDURE — 0042T CT HEAD PERFUSION W CONTRAST: CPT

## 2022-12-31 PROCEDURE — 97161 PT EVAL LOW COMPLEX 20 MIN: CPT | Mod: GP | Performed by: PHYSICAL THERAPIST

## 2022-12-31 PROCEDURE — 70450 CT HEAD/BRAIN W/O DYE: CPT

## 2022-12-31 PROCEDURE — 70496 CT ANGIOGRAPHY HEAD: CPT

## 2022-12-31 PROCEDURE — 93306 TTE W/DOPPLER COMPLETE: CPT | Mod: 26 | Performed by: INTERNAL MEDICINE

## 2022-12-31 PROCEDURE — 250N000013 HC RX MED GY IP 250 OP 250 PS 637: Performed by: STUDENT IN AN ORGANIZED HEALTH CARE EDUCATION/TRAINING PROGRAM

## 2022-12-31 PROCEDURE — 70498 CT ANGIOGRAPHY NECK: CPT

## 2022-12-31 PROCEDURE — 97530 THERAPEUTIC ACTIVITIES: CPT | Mod: GO

## 2022-12-31 PROCEDURE — 999N000208 ECHOCARDIOGRAM COMPLETE

## 2022-12-31 PROCEDURE — 85014 HEMATOCRIT: CPT | Performed by: NURSE PRACTITIONER

## 2022-12-31 PROCEDURE — 120N000001 HC R&B MED SURG/OB

## 2022-12-31 PROCEDURE — 999N000226 HC STATISTIC SLP IP EVAL DEFER: Performed by: SPEECH-LANGUAGE PATHOLOGIST

## 2022-12-31 PROCEDURE — 99223 1ST HOSP IP/OBS HIGH 75: CPT | Mod: GC | Performed by: PSYCHIATRY & NEUROLOGY

## 2022-12-31 PROCEDURE — 36415 COLL VENOUS BLD VENIPUNCTURE: CPT | Performed by: NURSE PRACTITIONER

## 2022-12-31 PROCEDURE — 99232 SBSQ HOSP IP/OBS MODERATE 35: CPT | Performed by: INTERNAL MEDICINE

## 2022-12-31 PROCEDURE — 97165 OT EVAL LOW COMPLEX 30 MIN: CPT | Mod: GO

## 2022-12-31 PROCEDURE — 99356 PR PROLONGED SERV,INPATIENT,1ST HR: CPT | Mod: GC | Performed by: PSYCHIATRY & NEUROLOGY

## 2022-12-31 PROCEDURE — 255N000002 HC RX 255 OP 636: Performed by: INTERNAL MEDICINE

## 2022-12-31 PROCEDURE — 97112 NEUROMUSCULAR REEDUCATION: CPT | Mod: GP | Performed by: PHYSICAL THERAPIST

## 2022-12-31 PROCEDURE — 250N000011 HC RX IP 250 OP 636: Performed by: INTERNAL MEDICINE

## 2022-12-31 PROCEDURE — 82310 ASSAY OF CALCIUM: CPT | Performed by: NURSE PRACTITIONER

## 2022-12-31 RX ORDER — ATORVASTATIN CALCIUM 80 MG/1
80 TABLET, FILM COATED ORAL EVERY EVENING
Status: DISCONTINUED | OUTPATIENT
Start: 2022-12-31 | End: 2023-01-03 | Stop reason: HOSPADM

## 2022-12-31 RX ORDER — IOPAMIDOL 755 MG/ML
120 INJECTION, SOLUTION INTRAVASCULAR ONCE
Status: COMPLETED | OUTPATIENT
Start: 2022-12-31 | End: 2022-12-31

## 2022-12-31 RX ADMIN — ASPIRIN 81 MG: 81 TABLET, CHEWABLE ORAL at 07:39

## 2022-12-31 RX ADMIN — IOPAMIDOL 120 ML: 755 INJECTION, SOLUTION INTRAVENOUS at 08:37

## 2022-12-31 RX ADMIN — ATORVASTATIN CALCIUM 80 MG: 80 TABLET, FILM COATED ORAL at 19:40

## 2022-12-31 RX ADMIN — SODIUM CHLORIDE 100 ML: 900 INJECTION INTRAVENOUS at 08:37

## 2022-12-31 RX ADMIN — HUMAN ALBUMIN MICROSPHERES AND PERFLUTREN 9 ML: 10; .22 INJECTION, SOLUTION INTRAVENOUS at 10:01

## 2022-12-31 RX ADMIN — CLOPIDOGREL BISULFATE 75 MG: 75 TABLET ORAL at 07:39

## 2022-12-31 ASSESSMENT — ACTIVITIES OF DAILY LIVING (ADL)
ADLS_ACUITY_SCORE: 20
PREVIOUS_RESPONSIBILITIES: MEAL PREP;HOUSEKEEPING;LAUNDRY;SHOPPING;MEDICATION MANAGEMENT;FINANCES;DRIVING
ADLS_ACUITY_SCORE: 20

## 2022-12-31 NOTE — PROVIDER NOTIFICATION
"Paged Stroke Neurology, \"Pt here with L CVA. Her symptoms she came in to the ED with have just come back this am after subsiding last night - RUE drift and RUE ataxia. No other deficits noted.\"    Dr. Naik called back with recommendations to let morning Neuro team assess the pt and if necessary she will order more scans. CTM for now.     "

## 2022-12-31 NOTE — PROGRESS NOTES
RECEIVING UNIT ED HANDOFF REVIEW    ED Nurse Handoff Report was reviewed by: Laura Coley RN on December 30, 2022 at 11:48 PM

## 2022-12-31 NOTE — PROGRESS NOTES
I was paged by nursing that Inga developed RUE weakness and ataxia, Both symptoms which have resolved since admission. Her MRI does show left corona radiata stroke with evidence of L M2 stenosis.  Plan:  Morning team will asses patient clinically and decide on scans

## 2022-12-31 NOTE — PHARMACY-CONSULT NOTE
Pharmacy Consult to evaluate for medication related stroke core measures    Inga Coats, 82 year old female admitted for Subtle restricted diffusion in the left corona radiata which could represent an acute to subacute infarct on 12/30/2022.    Thrombolytic was not given because of Time from onset contraindications    VTE Prophylaxis SCD's ordered on admission.     Antithrombotic: aspirin and clopidogrel started on 12/30, as appropriate by end of hospital day 2. Continue antithrombotic therapy on discharge to meet quality measures, unless contraindicated.    Anticoagulation if history of A-fib/flutter: Patient does not have history of A-fib/flutter - anticoagulation not required for medication related stroke core measures.     LDL Cholesterol Calculated   Date Value Ref Range Status   12/30/2022 96 <=100 mg/dL Final       Patient currently receiving Lipitor (atorvastatin) continue statin on discharge to meet quality measures, unless contraindicated.    Recommendations: None at this time    Thank you for the consult.    Audrey Wang [facility] 12/31/2022 2:25 PM

## 2022-12-31 NOTE — PLAN OF CARE
SLP: Orders received. Chart reviewed and discussed pt status with RN.  RN reports pt passed the nursing swallow screen and has been tolerating a regular diet without difficulty.  RN also reports pt has no speech-language deficits.  SLP not indicated due to no current swallow or speech-language needs.  Defer discharge recommendations to medical care team and any cognitive follow up to OT as felt indicated.  Will complete orders.  Please re-consult if increased swallow and/or communication concerns arise.

## 2022-12-31 NOTE — PROGRESS NOTES
[facility]    Stroke Progress Note    Interval Eventsovernight had right upper extremity drift and ataxia.     HPI Summary  Inga Coats is a 82 year old female presented with transient right hand weakness, noted to also be dragging right foot while walking. Symptoms lasted over an hour. She feels back to baseline now. She has a history of cardiac risk factors with CAD s/p CABG, HLD and prior TIAs.   Onset 810    Stroke Evaluation Summarized    MRI/Head CT IMPRESSION:    1. Subtle restricted diffusion in the left corona radiata which could  represent an acute to subacute infarct. No evidence of hemorrhagic  transformation of infarct. No mass effect or midline shift.  2. Moderate diffuse parenchymal volume loss and marked white matter  changes most likely due to chronic microvascular ischemic disease.    Intracranial Vasculature IMPRESSION:    1. No definite vascular cutoff of the proximal major intracranial  arteries.  2. Apparent moderate stenosis at the proximal left M2 branches   Cervical Vasculature                                                                  IMPRESSION:    1. Patent arteries in the neck without evidence of dissection.  2. Apparent atherosclerotic disease of the right carotid bifurcation  and proximal internal carotid artery which appears to cause  approximately 50% stenosis by NASCET criteria     Echocardiogram The visual ejection fraction is 65-70%.  There is no thrombus seen in the left ventricle.  RV not well seen, grossly normal size. Function is either normal or minimally  reduced  The aortic valve is not well visualized.  By report there is a prosthetic aortic valve. It is not well seen, it is  likely a tissue valve. Mean gradient likely normal for this unknown prosthetic  valve  No comparison echoes available   EKG/Telemetry SR   Other Testing Not Applicable      LDL  12/30/2022: 96 mg/dL   A1C  12/30/2022: 6.0 %   Troponin 12/30/2022: 8 ng/L  "      Impression   Subacute ischemic stroke of L corona radiata due to large-artery atherosclerosis- has vascular risk factors with CAD s/p CABG, Aortic stenosis s/p AVR, HLD and prior TIA who presented with transient right hand and foot weakness, now resolved. She has a subacute left corona radiata stroke on MRI and MRA notes some left MCA stenosis that would be consistent with ICAD.     Plan  - Neurochecks and Vital Signs every Q4H   - Daily aspirin 81 mg for secondary stroke prevention  - Plavix (clopidogrel) 75 mg PO Daily  - Statin: Lipitor 80mg- increase due to LDL 96 despite being on Lipitor 40  - 24-hour Telemetry  - Bedside Glucose Monitoring  - PT/OT/SLP  - Stroke Education  - Euthermia, Euglycemia   - DAPT 90 days then aspirin monotherapy after that  - Long term goals, BP <130/80, LDL <70, A1c <7  - cardiac event monitor     Patient Follow-up    - final recommendation pending work-up  - in 6-8 weeks with any stroke KRISTEN ([phone])    No further stroke evaluation is recommended, so we will sign off. Please contact us with any additional questions.    The Stroke Staff is Dr. Carbajal  .    Kaur Alvarenga MD  Vascular Neurology Fellow    To page me or covering stroke neurology team member, click here: AMCOM  Choose \"On Call\" tab at top, then select \"NEUROLOGY/ALL SITES\" from middle drop-down box, press Enter, then look for \"stroke\" or \"telestroke\" for your site.    ______________________________________________________    Clinically Significant Risk Factors Present on Admission         # Hyponatremia: Lowest Na = 135 mmol/L in last 2 days, will monitor as appropriate                          Medications   Scheduled Meds    aspirin  81 mg Oral or NG Tube Daily     atorvastatin  40 mg Oral or NG Tube QPM     clopidogrel  75 mg Oral or NG Tube Daily     sodium chloride (PF)  3 mL Intracatheter Q8H       Infusion Meds    - MEDICATION INSTRUCTIONS -       - MEDICATION INSTRUCTIONS -         PRN " Meds  acetaminophen, lidocaine 4%, lidocaine (buffered or not buffered), - MEDICATION INSTRUCTIONS -, - MEDICATION INSTRUCTIONS -, melatonin, ondansetron **OR** ondansetron, prochlorperazine **OR** prochlorperazine **OR** prochlorperazine, sodium chloride (PF)       PHYSICAL EXAMINATION  Temp:  [98.4  F (36.9  C)-99.1  F (37.3  C)] 98.4  F (36.9  C)  Pulse:  [70-87] 77  Resp:  [16-18] 16  BP: (137-190)/(67-93) 142/84  SpO2:  [94 %-100 %] 96 %    Mental Status:  alert, oriented x 3, follows commands, speech clear and fluent, naming and repetition normal  Cranial Nerves:  visual fields intact, EOMI with normal smooth pursuit, facial movements symmetric, hearing not formally tested but intact to conversation, palate elevation symmetric and uvula midline, no dysarthria, shoulder shrug strong bilaterally, tongue protrusion midline  Motor:  normal muscle tone and bulk, no abnormal movements, able to move all limbs spontaneously, strength 5/5 in left upper and lower ext, mild RUE drift and 5-/5 in right lower ext  Reflexes:  toes down-going  Sensory:  light touch sensation intact and symmetric throughout upper and lower extremities, no extinction on double simultaneous stimulation   Coordination:  normal finger-to-nose and heel-to-shin bilaterally without dysmetria, rapid alternating movements symmetric  Station/Gait:  deferred    Stroke Scales    NIHSS  1a. Level of Consciousness 0-->Alert, keenly responsive   1b. LOC Questions 0-->Answers both questions correctly   1c. LOC Commands 0-->Performs both tasks correctly   2.   Best Gaze 0-->Normal   3.   Visual 0-->No visual loss   4.   Facial Palsy 0-->Normal symmetrical movements   5a. Motor Arm, Left 0-->No drift, limb holds 90 (or 45) degrees for full 10 secs   5b. Motor Arm, Right 0-->No drift, limb holds 90 (or 45) degrees for full 10 secs   6a. Motor Leg, Left 0-->No drift, leg holds 30 degree position for full 5 secs   6b. Motor Leg, right 0-->No drift, leg holds 30  degree position for full 5 secs   7.   Limb Ataxia 0-->Absent   8.   Sensory 0-->Normal, no sensory loss   9.   Best Language 0-->No aphasia, normal   10. Dysarthria 0-->Normal   11. Extinction and Inattention  0-->No abnormality   Total 0 (12/31/22 0000)       Modified Patsy Score (Pre-morbid)    -      Imaging  I personally reviewed all imaging; relevant findings per HPI.     Lab Results Data   CBC  Recent Labs   Lab 12/30/22  1103   WBC 7.2   RBC 4.48   HGB 13.1   HCT 38.2        Basic Metabolic Panel    Recent Labs   Lab 12/31/22  0639 12/30/22  1103   NA  --  135   POTASSIUM  --  4.2   CHLORIDE  --  104   CO2  --  24   BUN  --  19   CR  --  0.77   * 118*   JAC  --  9.8     Liver Panel  No results for input(s): PROTTOTAL, ALBUMIN, BILITOTAL, ALKPHOS, AST, ALT, BILIDIRECT in the last 168 hours.  INR    Recent Labs   Lab Test 12/30/22  1103   INR 0.89      Lipid Profile    Recent Labs   Lab Test 12/30/22  1103   CHOL 214*   HDL 93   LDL 96   TRIG 127     A1C    Recent Labs   Lab Test 12/30/22  1103   A1C 6.0*     Troponin    Recent Labs   Lab 12/30/22  1103   TROPONINIS 8          Data

## 2022-12-31 NOTE — PROGRESS NOTES
"   12/31/22 1500   Appointment Info   Signing Clinician's Name / Credentials (OT) Sasha Bustamante, OTR/L   Living Environment   People in Home alone  (2 cats)   Current Living Arrangements apartment;independent living facility   Home Accessibility stairs to enter home  (Pt mcdaniel in lower garage, prefers to use stairs.)   Number of Stairs, Main Entrance greater than 10 stairs   Stair Railings, Main Entrance railings on both sides of stairs   Transportation Anticipated family or friend will provide   Living Environment Comments Pt resides alone in apartment. Not using AD at baseline. BR SET UP: Walk in shower, grab bars; stadard toilet.   Self-Care   Usual Activity Tolerance moderate   Regular Exercise Yes   Activity/Exercise Type walking   Exercise Amount/Frequency 2 times/wk  (treadmill in winter 2x/week, walks outside in summer daily)   Equipment Currently Used at Home none   Fall history within last six months no   Activity/Exercise/Self-Care Comment Pt ind all ADLs baseline.   Instrumental Activities of Daily Living (IADL)   Previous Responsibilities meal prep;housekeeping;laundry;shopping;medication management;finances;driving   IADL Comments Pt taking realtor classes   General Information   Onset of Illness/Injury or Date of Surgery 12/30/22   Referring Physician Xenia Car, DOMENICA CNP   Patient/Family Therapy Goal Statement (OT) To go home and be as independent as possible/return to [facility]   Additional Occupational Profile Info/Pertinent History of Current Problem Per chart: \"Inga Coats is a 82 year old female w/ PMH of of CAD status post three-vessel CABG, aortic stenosis requiring aortic valve replacement, hyperlipidemia, TIA, CKD 3 admitted on 12/30/2022 for TIA and subacute stroke.\"   Existing Precautions/Restrictions fall   Limitations/Impairments safety/cognitive   Cognitive Status Examination   Orientation Status person;place   Follows Commands follows two-step commands   Safety Deficit awareness " of need for assistance   Cognitive Status Comments Pt answers 2 of 3 higher level thinking questions appropriately; pt recalls 2 of 3 words asked to remember from beginning of session; Pt asking therapist for name on two occasions; Pt appears forgetful.   Visual Perception   Visual Impairment/Limitations WFL   Visual Acuity Intact near/far   Impact of Vision Impairment on Function (Vision) Saccades intact; fields intact; tracking smooth in all directions; difficulty with assessment of convergence; no diplopia   Sensory   Sensory Quick Adds sensation intact   Sensory Comments BUEs  - light touch sensation of B hands throughout radial, ulnar and median nerve distributions.   Range of Motion Comprehensive   Comment, General Range of Motion WFL B shoulder flex; B elbow flex/extension; B finger to thumb opposition   Strength Comprehensive (MMT)   Comment, General Manual Muscle Testing (MMT) Assessment WFL; LUE 4+/5 for shoulder flexion/extension, elbow flexion/extension,  strength intact; RUE 3+/5 shoulder flexion/extension, elbow flexion/extension,  strength slightly diminished compared to LUE   Coordination   Upper Extremity Coordination Right UE impaired   Gross Motor Coordination Slight over/undershooting of RUE with finger to thumb test; noted undershooting of R hand when placing back on walker after reaching   Fine Motor Coordination Increased time for R finger to thumb opposition   Functional Limitations Decreased speed;Fine motor ADL performance impaired;Reach to targets impaired   Coordination Comments Pt R handed   Bed Mobility   Bed Mobility supine-sit;sit-supine   Comment (Bed Mobility) SBA, increased time   Transfers   Transfers sit-stand transfer;toilet transfer   Sit-Stand Transfer   Sit/Stand Transfer Comments SBA   Toilet Transfer   Toilet Transfer Comments Min A   Activities of Daily Living   BADL Assessment/Intervention lower body dressing   Lower Body Dressing Assessment/Training   Comment,  (Lower Body Dressing) SBA   Clinical Impression   Criteria for Skilled Therapeutic Interventions Met (OT) Yes, treatment indicated   OT Diagnosis Decreased ind with I/ADLs   OT Problem List-Impairments impacting ADL problems related to;balance;cognition;coordination;mobility;strength   Assessment of Occupational Performance 3-5 Performance Deficits   Identified Performance Deficits LB dressing, toilet transfers, shower transfers, FMC tasks   Planned Therapy Interventions (OT) ADL retraining;IADL retraining;cognition;fine motor coordination training;transfer training   Clinical Decision Making Complexity (OT) low complexity   Risk & Benefits of therapy have been explained evaluation/treatment results reviewed;care plan/treatment goals reviewed;risks/benefits reviewed;current/potential barriers reviewed;participants voiced agreement with care plan;participants included;patient;daughter   OT Total Evaluation Time   OT Eval, Low Complexity Minutes (09498) 19   OT Goals   Therapy Frequency (OT) Daily   OT Predicted Duration/Target Date for Goal Attainment 01/11/23   OT Goals Hygiene/Grooming;Upper Body Dressing;Lower Body Dressing;Toilet Transfer/Toileting;Cognition;OT Goal 1   OT: Hygiene/Grooming modified independent;while standing  (FWW)   OT: Upper Body Dressing Modified independent;including set-up/clothing retrieval  (FWW)   OT: Lower Body Dressing Modified independent;including set-up/clothing retrieval  (FWW)   OT: Toilet Transfer/Toileting Modified independent;cleaning and garment management  (FWW)   OT: Cognitive Patient/caregiver will verbalize understanding of cognitive assessment results/recommendations as needed for safe discharge planning   OT: Goal 1 Pt will verbalize/demo understanding of 3 FMC activities and 3 GMC/strengthening activities for increased independence with self-care tasks.   Therapeutic Activities   Therapeutic Activity Minutes (64060) 31   Symptoms noted during/after treatment fatigue    Treatment Detail/Skilled Intervention Pt supine in bed, pt's daughter entering room, pt agreeable to OT. Pt demo supine>sitting EOB with SBA, HOB raised, increased time for task. Pt demo STS to/from EOB with SBA, FWW, VCs to push up from and reach back for surface for safety. Pt demo room level functional mobility to/from BR with Min A for balance, slight R lateral lean, VCs for walker safety. Pt demo toilet transfer with Min A, FWW, for controlled sit/to come into standing, use of grab bar and hand held assist to simulate sturdy countertop. Pt demo twisting and untwisting of toothpaste cap standing at sink with Min A for standing balance, FWW. Pt demo LB dressing sitting EOB to doff/don B socks with SBA, figure-4 technique, increased time to doff/don L sock using RUE. Pt demo sitting EOB>supine with SBA, increased time for task. Pt and daughter provided handouts/educated on GMC/FMC activities to pratice during stay - pt's daughter plans to bring in coloring utensils/additional items for FMC. Pt supine in bed with all needs met, alarm set, daughter present.   OT Discharge Planning   OT Plan Cog screen; RUE strengthening; 9-hole peg test; gross/fine motor coordination - pt motivated by make-up/brushing hair and wants to get back to taking care of cats   OT Discharge Recommendation (DC Rec) [facility] patient will benefit from intensive, interdisciplinary therapy.  Anticipate will be able to tolerate 3 hours of therapy per day   OT Rationale for DC Rec Pt currently limited by deficits in fine/gross motor coordination, functional mobility, balance, cognition and strength, impacting overall independence in I/ADLs. Pt currently A x 1 for all functional mobility and requires increased assist for I/ADLs. Pt would benefit from skilled ARU to address impairments noted. Pt is motivated to return to baseline LOF, would be able to tolerate 3 hours of intense rehab, and has supportive/involved family.   OT  Brief overview of current status SBA STS to/from EOB with FWW; Min A functional mobility w/FWW in room; Min A toilet transfers; 2 of 3 higher level thinking Qs and 2 of 3 delayed recall words answered appropriately   Total Session Time   Timed Code Treatment Minutes 31   Total Session Time (sum of timed and untimed services) 50

## 2022-12-31 NOTE — PROGRESS NOTES
12/31/22 1000   Appointment Info   Signing Clinician's Name / Credentials (PT) Bita Nelson, PT, DPT      Language English   Living Environment   People in Home alone   Current Living Arrangements condominium   Living Environment Comments Reports no stairs to access condominium, elevator parking garage. Still drives, has stairs to access family members' homes (w/B railings).   Self-Care   Usual Activity Tolerance good   Current Activity Tolerance moderate   Regular Exercise Yes   Activity/Exercise Type walking   Exercise Amount/Frequency 2 times/wk   Equipment Currently Used at Home none   Fall history within last six months no   Activity/Exercise/Self-Care Comment Inde at baseline   General Information   Onset of Illness/Injury or Date of Surgery 12/30/22   Referring Physician Xenia Car, APRN CNP   Patient/Family Therapy Goals Statement (PT) Stay as indep as possible.   Pertinent History of Current Problem (include personal factors and/or comorbidities that impact the POC) Inga Coats is a(n) 82 year old female with h/o CAD/CABG, AVR, HLD, CKD who presented on 12/30/2022 with transient R sided weakness. MRI/A showed L M2 stenosis and L corona radiata stroke. Had recurrent/worsening symptoms this AM, repeat NCCT/CTA/CTP without changes. TTE with normal LVEF, normal atria. LDL 96, A1c 6%. On exam today she has RUE > RLE weakness.   Existing Precautions/Restrictions fall   Cognition   Cognitive Status Comments WFL for purposes of fn mobilty, see OT consult for further insight into cognition.   Integumentary/Edema   Integumentary/Edema no deficits were identifed   Posture    Posture Forward head position;Protracted shoulders;Kyphosis   Range of Motion (ROM)   Range of Motion ROM deficits secondary to weakness   Strength (Manual Muscle Testing)   Strength (Manual Muscle Testing) Deficits observed during functional mobility   Strength Comments Mild R LE weakness ~4+ to 5- out of 5. More evident  weakness w/incr fatigue w/ambulation, drags R LE. R UE ~4/5. Otherwise WNL.   Bed Mobility   Comment, (Bed Mobility) CGA   Transfers   Comment, (Transfers) SST w/Bonifacio   Gait/Stairs (Locomotion)   Comment, (Gait/Stairs) Bonifacio w/2WW   Balance   Balance Comments Needs 1-2 UE support for dynamic balance   Sensory Examination   Sensory Perception patient reports no sensory changes   Sensory Perception Comments Appeared intact to LT, no evident propioception deficits   Coordination   Coordination Comments Mild R LE incoordination, anticipate weakness played a part; R UE dysmetria noted w/FNF   Muscle Tone   Muscle Tone no deficits were identified   Clinical Impression   Criteria for Skilled Therapeutic Intervention Yes, treatment indicated   PT Diagnosis (PT) Impaired indep w/fn mob   Influenced by the following impairments Strength, coordination, balance, activity tolerance   Functional limitations due to impairments Transfers, ambulation, stairs   Clinical Presentation (PT Evaluation Complexity) Stable/Uncomplicated   Clinical Presentation Rationale Few affecting comorbidities, assessment incl strength, balance, fn mob. Evolving presentation.   Clinical Decision Making (Complexity) low complexity   Planned Therapy Interventions (PT) balance training;bed mobility training;gait training;home exercise program;neuromuscular re-education;postural re-education;strengthening;stair training;transfer training;patient/family education   Anticipated Equipment Needs at Discharge (PT) walker, rolling   Risk & Benefits of therapy have been explained evaluation/treatment results reviewed;care plan/treatment goals reviewed;participants voiced agreement with care plan;participants included;patient;current/potential barriers reviewed;risks/benefits reviewed;daughter   PT Total Evaluation Time   PT Eval, Low Complexity Minutes (51179) 10   Physical Therapy Goals   PT Frequency One time eval and treatment only   PT Goals Bed  Mobility;Transfers;Gait;Stairs   PT: Bed Mobility Modified independent   PT: Transfers Modified independent   PT: Gait Modified independent;Supervision/stand-by assist   PT: Stairs Minimal assist   Interventions   Interventions Quick Adds Neuromuscular Re-ed   Neuromuscular Re-education   Neuromuscular Re-Education Minutes (46466) 28   Symptoms Noted During/After Treatment fatigue   Treatment Detail/Skilled Intervention Session focused on R LE nm re-ed, reducing falls risk, promoting healing. Educ pt/family throughout on impairments, fatigue, d/c plan, pt/family in agreement, pt reports she wants to be as indep as possible. Completed in-room and ~30ft amb w/2WW and Bonifacio, progressed to CGA. Progressed difficulty to no AD and min-modA, amb ~400ft. Head turns H/V, retro amb, sideways ~10ft x 2 each. Two large losses of balance with turning needing assist to prevent fall. Cues for sequencing, amplitude w/R LE, pt dragging R LE with incr distance. Climbed/desc 4 steps w/B railing and CGA, step to pattern.   PT Discharge Planning   PT Plan R LE nm re-ed, gait w/R ankle wt, stairs, [place]/FGA   PT Discharge Recommendation (DC Rec) [facility]-Motivated patient will benefit from intensive, interdisciplinary therapy.  Anticipate will be able to tolerate 3 hours of therapy per day;home with assist;home with outpatient physical therapy   PT Rationale for DC Rec Pt presents below her indep baseline. Completed transfers + amb w/2WW and Bonifacio, and amb w/no AD and min-modA. Pt has good family support, is very motivated to remain as indep as possible. Anticipate pt might progress to d/c home w/OPPT, will continue to assess. If d/c home, pt would require 24/7 Ax1 w/2WW w/all fn mobilty and OPPT to maximize fn outcomes.   PT Brief overview of current status Nursing please use Ax1 w/2WW   Total Session Time   Timed Code Treatment Minutes 28   Total Session Time (sum of timed and untimed services) 38

## 2022-12-31 NOTE — PLAN OF CARE
Goal Outcome Evaluation:      Plan of Care Reviewed With: patient    Overall Patient Progress: improvingOverall Patient Progress: improving    Reason for Admission: L CVA & L M2 stenosis    Cognitive/Mentation: A/Ox 4.  Neuros/CMS: Intact ex RUE ataxia that presented on admission- improved and came back upon waking this AM with weakness and weakness in RLE.   VS: VSS. SBP goal<220.   Tele: NR w/ PVC.  GI: BS active, + flatus. Continent.  : WDL. Continent.  Pulmonary: LS clear.  Pain: None.     Drains/Lines: 2x R PIV S.L.  Skin: Intact.  Activity: A1 with GB/W.  Diet: Regular with thin liquids. Takes pills whole.     Therapies recs: ARU  Discharge: Pending    Aggression Stoplight Tool: GREEN    End of shift summary: SP therapy signed off. Pt woke up with RUE ataxia and RUE/RLE weakness that was presented on admission and had originally resolved- STAT CT completed, no change. ECHO completed.

## 2022-12-31 NOTE — PROGRESS NOTES
[facility]    Medicine Progress Note - Hospitalist Service    Date of Admission:  12/30/2022    Assessment & Plan   Inga Coats is a 82 year old female w/ PMH of of CAD status post three-vessel CABG, aortic stenosis requiring aortic valve replacement, hyperlipidemia, TIA, CKD 3 admitted on 12/30/2022 for TIA and subacute stroke.        Acute vs subacute left corona radiata stroke:  Presents with with right hand discoordination, right upper extremity weakness and possible shuffling gait on the right lower extremity now resolved at time of admission.    *MRI brain showed subtle restricted diffusion in the left corona radiata which could represent an acute to subacute infarct.   * MRA neck- Patent arteries in the neck without evidence of dissection. 2. Apparent atherosclerotic disease of the right carotid bifurcation and proximal internal carotid artery which appears to cause approximately 50% stenosis by NASCET criteria  * loaded with plavix in ED, then started on ASA, Plavix.    * Recurrent symptoms of right upper ext ataxia,drift, weakness am of 12/31. Neurology contacted. Repeat CT/CTA head and neck CTA without changes (see report for detail)  *Per neuro, stroke likely due to intracranial atherosclerosis  -Continue aspirin 81 mg daily  -Continue Plavix 75 mg daily x90 days  -PTA Lipitor increased to 80 mg daily, LDL 96  -30-day cardiac monitor at discharge  -Follow-up on [facility]  -Outpatient blood pressure goal less than 130/80, monitor BP today, if elevated, plan to start low-dose amlodipine tomorrow  -PT/OT evaluation, per report PT rec ARU, will follow recommendation available  -Telemetry    CAD status post three-vessel CABG  aortic stenosis s/p aortic valve replacement  -antiplatelet and statin therapy as above     Hyperlipidemia  -statin increased as above.  LDL 96    CKD 3  -baseline Cr 1.05     Hyperglycemia w/ prediabetes, newly identified this  admission, A1c 6%  -OP follow up          Diet: Combination Diet Regular Diet; Moderate Consistent Carb (60 g CHO per Meal) Diet; 2 gm NA Diet; Low Saturated Fat Na <2400mg Diet    DVT Prophylaxis: Pneumatic Compression Devices  Mc Catheter: Not present  Central Lines: None  Cardiac Monitoring: ACTIVE order. Indication: Stroke, acute (48 hours)  Code Status: Full Code      Disposition Plan     Expected Discharge Date: 01/01/2023                The patient's care was discussed with the Bedside Nurse, Patient and Patient's Family.    Samantha Walker MD  Hospitalist Service  [facility]  Securely message with the [employer] Web Console (learn more here)  Text page via [employer] Paging/Directory         Clinically Significant Risk Factors Present on Admission         # Hyponatremia: Lowest Na = 135 mmol/L in last 2 days, will monitor as appropriate                       ______________________________________________________________________    Interval History   Patient had recurrent symptoms of right upper ext ataxia and drift this morning. Neuro was contacted and CTH/CTA completed without new findings.   Patient states she right upper ext still feels weak and can't hold it up. She also reports right leg is also weak today. It is not any better this afternoon. Denies n/v or abdominal pain. She is afebrile. Per pt/daugther, PT recommends ARU, formal PT note not yet available.   Multiple questions answered for pt/family.     Data reviewed today: I reviewed all medications, new labs and imaging results over the last 24 hours. I personally reviewed the CTH/CTA neck image(s) showing as above.    Physical Exam   Vital Signs: Temp: 98.5  F (36.9  C) Temp src: Oral BP: (!) 147/70 Pulse: 79   Resp: 18 SpO2: 96 % O2 Device: None (Room air)    Weight: 135 lbs 0 oz  General Appearance: Alert,awake and no apparent distress  Respiratory: CTAB, no wheezing  Cardiovascular: regular rate and rhythm  GI: soft and  non tender  Skin: warm and dry  Neuro: Some right upper ex drift, right lower ext weakness    Data   Recent Labs   Lab 12/31/22  1242 12/31/22  0710 12/31/22  0639 12/30/22  1103   WBC  --  6.4  --  7.2   HGB  --  12.6  --  13.1   MCV  --  85  --  85   PLT  --  288  --  292   INR  --   --   --  0.89   NA  --  137  --  135   POTASSIUM  --  4.1  --  4.2   CHLORIDE  --  106  --  104   CO2  --  23  --  24   BUN  --  18  --  19   CR  --  0.80  --  0.77   ANIONGAP  --  8  --  7   JAC  --  9.4  --  9.8   * 113* 111* 118*     Recent Results (from the past 24 hour(s))   CTA Head Neck with Contrast    Narrative    EXAM: CTA HEAD NECK W CONTRAST, CT HEAD W/O CONTRAST  LOCATION: [facility]  DATE/TIME: 12/31/2022 9:01 AM    INDICATION: New RUE drift.  COMPARISON: Brain MRI and MRA head and neck 12/30/2022.  CONTRAST: 75 mL Isovue 370  TECHNIQUE: Head and neck CT angiogram with IV contrast. Noncontrast head CT followed by axial helical CT images of the head and neck vessels obtained during the arterial phase of intravenous contrast administration. Axial 2D reconstructed images and   multiplanar 3D MIP reconstructed images of the head and neck vessels were performed by the technologist. Dose reduction techniques were used. All stenosis measurements made according to NASCET criteria unless otherwise specified.    FINDINGS:   NONCONTRAST HEAD CT:   INTRACRANIAL CONTENTS: No intracranial hemorrhage, extraaxial collection, or mass effect.  The focal area of diffusion restriction seen on previous MRI is not definitively distinguishable from background moderate chronic microvascular ischemic change.   Mild to moderate generalized volume loss. No hydrocephalus.     VISUALIZED ORBITS/SINUSES/MASTOIDS: No intraorbital abnormality. No paranasal sinus mucosal disease. No middle ear or mastoid effusion.    BONES/SOFT TISSUES: No acute abnormality.    HEAD CTA:  ANTERIOR CIRCULATION: Mild stenosis at the left  M1/M2 junction, less pronounced than on previous MRA. No significant large artery stenosis or occlusion. No aneurysm or vascular malformation. Standard [place] anatomy.    POSTERIOR CIRCULATION: No stenosis/occlusion, aneurysm, or high flow vascular malformation. Balanced vertebral arteries supply a normal basilar artery.     DURAL VENOUS SINUSES: Expected early venous phase enhancement of the major dural venous sinuses.    NECK CTA:  RIGHT CAROTID: Moderate plaque at the carotid bifurcation with focal 55% stenosis within the proximal internal carotid artery.    LEFT CAROTID: Mild plaque at the carotid bifurcation without measurable stenosis of the internal carotid artery.    VERTEBRAL ARTERIES: No focal stenosis or dissection. Balanced vertebral arteries.    AORTIC ARCH: Scattered calcified plaque within the aortic arch. No significant stenosis of the great vessels.    NONVASCULAR STRUCTURES: Unremarkable.      Impression    IMPRESSION:   HEAD CT:  1.  No acute hemorrhage.  2.  Small lacunar infarction left corona radiata seen on previous MRI is difficult to distinguish from background chronic microvascular ischemic change.  3.  Mild to moderate generalized volume loss.    HEAD CTA:   1.  Redemonstrated mild narrowing at the left M1/M2 junction.  2.  No significant large artery stenosis or occlusion.    NECK CTA:  1.  Moderate 55% stenosis at the proximal right internal carotid artery.  2.  No measurable stenosis within the left internal carotid artery.  3.  No vertebral artery stenosis.   CT Head w/o Contrast    Narrative    EXAM: CTA HEAD NECK W CONTRAST, CT HEAD W/O CONTRAST  LOCATION: [facility]  DATE/TIME: 12/31/2022 9:01 AM    INDICATION: New RUE drift.  COMPARISON: Brain MRI and MRA head and neck 12/30/2022.  CONTRAST: 75 mL Isovue 370  TECHNIQUE: Head and neck CT angiogram with IV contrast. Noncontrast head CT followed by axial helical CT images of the head and neck  vessels obtained during the arterial phase of intravenous contrast administration. Axial 2D reconstructed images and   multiplanar 3D MIP reconstructed images of the head and neck vessels were performed by the technologist. Dose reduction techniques were used. All stenosis measurements made according to NASCET criteria unless otherwise specified.    FINDINGS:   NONCONTRAST HEAD CT:   INTRACRANIAL CONTENTS: No intracranial hemorrhage, extraaxial collection, or mass effect.  The focal area of diffusion restriction seen on previous MRI is not definitively distinguishable from background moderate chronic microvascular ischemic change.   Mild to moderate generalized volume loss. No hydrocephalus.     VISUALIZED ORBITS/SINUSES/MASTOIDS: No intraorbital abnormality. No paranasal sinus mucosal disease. No middle ear or mastoid effusion.    BONES/SOFT TISSUES: No acute abnormality.    HEAD CTA:  ANTERIOR CIRCULATION: Mild stenosis at the left M1/M2 junction, less pronounced than on previous MRA. No significant large artery stenosis or occlusion. No aneurysm or vascular malformation. Standard [place] of [place] anatomy.    POSTERIOR CIRCULATION: No stenosis/occlusion, aneurysm, or high flow vascular malformation. Balanced vertebral arteries supply a normal basilar artery.     DURAL VENOUS SINUSES: Expected early venous phase enhancement of the major dural venous sinuses.    NECK CTA:  RIGHT CAROTID: Moderate plaque at the carotid bifurcation with focal 55% stenosis within the proximal internal carotid artery.    LEFT CAROTID: Mild plaque at the carotid bifurcation without measurable stenosis of the internal carotid artery.    VERTEBRAL ARTERIES: No focal stenosis or dissection. Balanced vertebral arteries.    AORTIC ARCH: Scattered calcified plaque within the aortic arch. No significant stenosis of the great vessels.    NONVASCULAR STRUCTURES: Unremarkable.      Impression    IMPRESSION:   HEAD CT:  1.  No acute hemorrhage.  2.   Small lacunar infarction left corona radiata seen on previous MRI is difficult to distinguish from background chronic microvascular ischemic change.  3.  Mild to moderate generalized volume loss.    HEAD CTA:   1.  Redemonstrated mild narrowing at the left M1/M2 junction.  2.  No significant large artery stenosis or occlusion.    NECK CTA:  1.  Moderate 55% stenosis at the proximal right internal carotid artery.  2.  No measurable stenosis within the left internal carotid artery.  3.  No vertebral artery stenosis.   CT Head Perfusion w Contrast    Narrative    EXAM: CT HEAD PERFUSION W CONTRAST  LOCATION: [facility]  DATE/TIME: 2022 9:06 AM    INDICATION: New RUE drift.  COMPARISON: None.  TECHNIQUE: CT cerebral perfusion was performed utilizing a second contrast bolus. Perfusion data were post processed with generation of standard perfusion maps and estimation of ischemic/infarcted volumes utilizing standard threshold values. Dose   reduction techniques were used. All stenosis measurements made according to NASCET criteria unless otherwise specified.  CONTRAST: 50 mL Isovue 370    FINDINGS:   PERFUSION MAPS: Symmetrical cerebral perfusion. No focal deficits in cerebral blood flow or volume to suggest ischemia/oligemia.    RAPID ANALYSIS:  CBF<30%: 0 mL.  Tmax>6sec: 0 mL.  Mismatch volume: 0 mL.  Mismatch ratio: None.      Impression    IMPRESSION:   1.  Normal cerebral perfusion.   Echocardiogram Complete - For age > 60 yrs   Result Value    LVEF  65-70%    Narrative    [id number]  [id number]  [id number]  [id number]^NOHEMI^JULIANE^HEATHER     [facility]  Echocardiography Laboratory  [address] [place] [address]     Name: AMARIS BAKER  MRN: [id number]  : 1940  Study Date: 2022 09:35 AM  Age: 82 yrs  Gender: Female  Patient Location: [facility]  Reason For Study: Cerebrovascular Incident  Ordering Physician: JULIANE SONG  Referring Physician:  Joe Ramires  Performed By: Emma Jason     BSA: 1.7 m2  Height: 65 in  Weight: 135 lb  HR: 79  BP: 162/78 mmHg  ______________________________________________________________________________  Procedure  Complete Portable Echo Adult. Optison (NDC #[id number]) given intravenously.  ______________________________________________________________________________  Interpretation Summary     The visual ejection fraction is 65-70%.  There is no thrombus seen in the left ventricle.  RV not well seen, grossly normal size. Function is either normal or minimally  reduced  The aortic valve is not well visualized.  By report there is a prosthetic aortic valve. It is not well seen, it is  likely a tissue valve. Mean gradient likely normal for this unknown prosthetic  valve  No comparison echoes available  Limited views were obtained.  The study was technically difficult.  The study was technically difficult. Limited views were obtained.  ______________________________________________________________________________  Left Ventricle  The left ventricle is normal in size. Proximal septal thickening is noted.  Echo findings are not consistent with left ventricular outflow obstruction.  The visual ejection fraction is 65-70%. Left ventricular diastolic function is  indeterminate. Normal left ventricular wall motion. There is no thrombus seen  in the left ventricle.     Right Ventricle  RV not well seen, grossly normal size. Function is either normal or minimally  reduced. The right ventricle is not well visualized.     Atria  Normal left atrial size. Right atrial size is normal.     Mitral Valve  There is trace mitral regurgitation.     Tricuspid Valve  Normal tricuspid valve. Right ventricular systolic pressure could not be  approximated due to inadequate tricuspid regurgitation.     Aortic Valve  The aortic valve is not well visualized. By report there is a prosthetic  aortic valve. It is not well seen, it is likely  a tissue valve. Mean gradient  likely normal for this unknown prosthetic valve.     Pulmonic Valve  The pulmonic valve is not well seen, but is grossly normal.     Vessels  The aortic root is normal size. IVC diameter <2.1 cm collapsing >50% with  sniff suggests a normal RA pressure of 3 mmHg.     Pericardium  The pericardium appears normal.     Rhythm  Sinus rhythm was noted.  ______________________________________________________________________________  MMode/2D Measurements & Calculations  IVSd: 0.87 cm     LVIDd: 3.6 cm  LVIDs: 2.3 cm  LVPWd: 0.78 cm  FS: 36.6 %  LV mass(C)d: 81.7 grams  LV mass(C)dI: 48.8 grams/m2  LA dimension: 3.9 cm  asc Aorta Diam: 2.9 cm  LVOT diam: 2.0 cm  LVOT area: 3.3 cm2  LA Volume (BP): 37.4 ml  LA Volume Index (BP): 22.4 ml/m2  RWT: 0.43     Doppler Measurements & Calculations  MV E max renny: 58.3 cm/sec  MV A max renny: 75.4 cm/sec  MV E/A: 0.77  MV dec time: 0.21 sec  Ao V2 max: 201.5 cm/sec  Ao max P.0 mmHg  Ao V2 mean: 139.5 cm/sec  Ao mean P.0 mmHg  Ao V2 VTI: 36.6 cm  IMANI(I,D): 1.5 cm2  IMANI(V,D): 1.4 cm2  LV V1 max P.9 mmHg  LV V1 max: 84.7 cm/sec  LV V1 VTI: 16.7 cm  SV(LVOT): 54.6 ml  SI(LVOT): 32.6 ml/m2  PA acc time: 0.17 sec  AV Renny Ratio (DI): 0.42  IMANI Index (cm2/m2): 0.89  E/E' av.6  Lateral E/e': 6.8  Medial E/e': 8.4     ______________________________________________________________________________  Report approved by: Kurt Crespo 2022 10:48 AM           Medications     - MEDICATION INSTRUCTIONS -       - MEDICATION INSTRUCTIONS -         aspirin  81 mg Oral or NG Tube Daily     atorvastatin  80 mg Oral or NG Tube QPM     clopidogrel  75 mg Oral or NG Tube Daily     sodium chloride (PF)  3 mL Intracatheter Q8H

## 2022-12-31 NOTE — PLAN OF CARE
Reason for Admission: Subacute left corona radiata stroke    Cognitive/Mentation: A/Ox 4  Neuros/CMS: Intact   VS: stable on RA.   Tele: NSR.  GI: Continent.  : Continent.  Pulmonary: LS clear.  Pain: denies.     Drains/Lines: PIV  Skin: intact  Activity: independent.  Diet: Mod carb, low sodium with thin liquids. Takes pills whole.     Therapies recs: pending  Discharge: pending    End of shift summary: pt arrived to the unit at 0000. Pt stable, states she is back to her baseline. Plan for echo.

## 2023-01-01 PROCEDURE — 250N000013 HC RX MED GY IP 250 OP 250 PS 637: Performed by: INTERNAL MEDICINE

## 2023-01-01 PROCEDURE — 250N000013 HC RX MED GY IP 250 OP 250 PS 637: Performed by: STUDENT IN AN ORGANIZED HEALTH CARE EDUCATION/TRAINING PROGRAM

## 2023-01-01 PROCEDURE — 258N000003 HC RX IP 258 OP 636: Performed by: INTERNAL MEDICINE

## 2023-01-01 PROCEDURE — 120N000001 HC R&B MED SURG/OB

## 2023-01-01 PROCEDURE — 99232 SBSQ HOSP IP/OBS MODERATE 35: CPT | Performed by: INTERNAL MEDICINE

## 2023-01-01 RX ORDER — SODIUM CHLORIDE 9 MG/ML
INJECTION, SOLUTION INTRAVENOUS CONTINUOUS
Status: ACTIVE | OUTPATIENT
Start: 2023-01-01 | End: 2023-01-02

## 2023-01-01 RX ADMIN — SODIUM CHLORIDE, PRESERVATIVE FREE: 5 INJECTION INTRAVENOUS at 14:36

## 2023-01-01 RX ADMIN — ATORVASTATIN CALCIUM 80 MG: 80 TABLET, FILM COATED ORAL at 20:28

## 2023-01-01 RX ADMIN — ASPIRIN 81 MG: 81 TABLET, CHEWABLE ORAL at 07:48

## 2023-01-01 RX ADMIN — CLOPIDOGREL BISULFATE 75 MG: 75 TABLET ORAL at 07:48

## 2023-01-01 ASSESSMENT — ACTIVITIES OF DAILY LIVING (ADL)
ADLS_ACUITY_SCORE: 20
ADLS_ACUITY_SCORE: 21
ADLS_ACUITY_SCORE: 20

## 2023-01-01 NOTE — PROGRESS NOTES
Sw:  D: Per chart review- PT and OT are recommending ARU. Referral sent to determine if she is appropriate and if they have a bed for patient.    P: Care management will continue to follow.    PEDRO Robles, [facility]   Social Work   [facility]

## 2023-01-01 NOTE — PROVIDER NOTIFICATION
"MD Notification    Notified Person: MD    Notified Person Name: Luis Alvarenga    Notification Date/Time: 1/1/2023 0796    Notification Interaction: [employer]    Purpose of Notification: \"FYI, pt having RUE drift with ataxia and reporting an increasing in weakness. NIHSS a 2. Thanks\"    Comments: Continue to monitor    Addendum 1 3857: \"Pt and family are very concerned about worsening function of R arm and weakness. Hospitalist wondering if you can stop by and see pt, family may need some reassurance from neuro stroke standpoint? She is starting pt on fluids. Thanks\"      Comments: Came to see pt. Continue to monitor- see note.    "

## 2023-01-01 NOTE — PLAN OF CARE
Reason for Admission: Subacute left corona radiata stroke     Cognitive/Mentation: A/Ox 4  Neuros/CMS: Intact ex RUE ataxia, drift, RLE ataxia, R hand fine motor skill impairment  VS: stable on RA.   Tele: NSR with PVC's  GI: Continent.  : Continent.  Pulmonary: LS clear.  Pain: denies.      Drains/Lines: PIV  Skin: intact  Activity: Ax1 GBW.  Diet: Mod carb, low sodium with thin liquids. Takes pills whole.      Therapies recs: ARU  Discharge: pending     End of shift summary: stable overnight, no changes in neuro status.

## 2023-01-01 NOTE — PROGRESS NOTES
[facility]    Medicine Progress Note - Hospitalist Service    Date of Admission:  12/30/2022    Assessment & Plan   Inga Coats is a 82 year old female w/ PMH of of CAD status post three-vessel CABG, aortic stenosis requiring aortic valve replacement, hyperlipidemia, TIA, CKD 3 admitted on 12/30/2022 for TIA and subacute stroke.        Acute vs subacute left corona radiata stroke  Presents with with right hand discoordination, right upper extremity weakness and possible shuffling gait on the right lower extremity which resolved at time of admission.    *MRI brain showed subtle restricted diffusion in the left corona radiata which could represent an acute to subacute infarct.   * MRA neck- Patent arteries in the neck without evidence of dissection. 2. Apparent atherosclerotic disease of the right carotid bifurcation and proximal internal carotid artery which appears to cause approximately 50% stenosis by NASCET criteria  * loaded with plavix in ED, then started on ASA, Plavix.    * Recurrent symptoms of right upper ext ataxia,drift, weakness am of 12/31. Neurology contacted. Repeat CT/CTA head and neck CTA without changes (see report for detail)  *Per neuro, stroke likely due to intracranial atherosclerosis  * TTE-LVEF 65-70%, no thrombus seen in LV  * 1/1-reporting more weakness in right upper ext, neuro evaluated and monitor. If symptoms persisting recommend MRI brain wo    -Continue aspirin 81 mg daily  -Continue Plavix 75 mg daily x90 days  -PTA Lipitor increased to 80 mg daily, LDL 96  -BP has normalized without intervention today. Will give her NS @75cc/hr x 12 hrs  - avoid hypotenstion  -30-day cardiac monitor at discharge  -Follow-up on [facility]  -PT/OT evaluation, rec ARU, social work following  -Telemetry  - monitor symptoms, if right upper ext weakness persists, MRI brain without per neurology     CAD status post three-vessel CABG  aortic  stenosis s/p aortic valve replacement  -antiplatelet and statin therapy as above     Hyperlipidemia  -statin increased as above.  LDL 96    CKD 3  -baseline Cr 1.05     Hyperglycemia w/ prediabetes, newly identified this admission, A1c 6%  -OP follow up            Diet: Combination Diet Regular Diet; Moderate Consistent Carb (60 g CHO per Meal) Diet; 2 gm NA Diet; Low Saturated Fat Na <2400mg Diet    DVT Prophylaxis: Pneumatic Compression Devices  Mc Catheter: Not present  Lines: None     Cardiac Monitoring: ACTIVE order. Indication: Stroke, acute (48 hours)  Code Status: Full Code      Clinically Significant Risk Factors                                Disposition Plan      Expected Discharge Date: 01/01/2023                  Samantha Walker MD  Hospitalist Service  [facility]  Securely message with [employer] (more info)  Text page via [facility] Paging/Directory   ______________________________________________________________________    Interval History   Patient reports right upper ext weakness feels worse today. States right leg weakness is the same.   Denies headache, speech difficulty, nausea or vomiting. She is afebrile.   Daughter is at bedside and questions answered.     Physical Exam   Vital Signs: Temp: 99.1  F (37.3  C) Temp src: Oral BP: 111/65 Pulse: 70   Resp: 18 SpO2: 93 % O2 Device: None (Room air)    Weight: 135 lbs 0 oz    General Appearance: Alert, awake and no apparent distress  Respiratory: CTAB, no wheezing  Cardiovascular: regular rate and rhythm  GI: soft and non-tender  Skin: warm and dry       Medical Decision Making       MANAGEMENT DISCUSSED with the following over the past 24 hours: patient, RN, patient's daughter   NOTE(S)/MEDICAL RECORDS REVIEWED over the past 24 hours: neurology note, nursing note  Tests personally interpreted in the past 24 hours:  - echocardiogram showing preseved LV function      Data         Imaging results reviewed over the past 24 hrs:    No results found for this or any previous visit (from the past 24 hour(s)).

## 2023-01-01 NOTE — PROGRESS NOTES
Brief Clinical Note:    Paged about worsening RUE weakness, patient has right arm weakness and a known subacute L Corona radiata stroke. She has no other new deficits. Her symptoms also fluctuate as well. In addition patient noted working with PT/OT with extensive walking the day prior    On her exam she has 3/5 strength in arm abduction, 4-5/ in arm extension 5/5 arm flexion, 3/5 in wrist extension. Which is weaker from her prior exam. Her RLE has 4/5 strength which is the same.       Plan:  SBP goal allow to autopress  If sxs persist then can get repeat MRI brain WO  SBP goal <220  Continue DAPT  Avoid hypotension    Kaur Alvarenga   Vascular Stroke Fellow    Stroke staff is Dr. Carbajal

## 2023-01-02 ENCOUNTER — APPOINTMENT (OUTPATIENT)
Dept: PHYSICAL THERAPY | Facility: CLINIC | Age: 83
DRG: 065 | End: 2023-01-02
Payer: COMMERCIAL

## 2023-01-02 ENCOUNTER — APPOINTMENT (OUTPATIENT)
Dept: OCCUPATIONAL THERAPY | Facility: CLINIC | Age: 83
DRG: 065 | End: 2023-01-02
Payer: COMMERCIAL

## 2023-01-02 LAB
ANION GAP SERPL CALCULATED.3IONS-SCNC: 5 MMOL/L (ref 3–14)
BUN SERPL-MCNC: 17 MG/DL (ref 7–30)
CALCIUM SERPL-MCNC: 9.1 MG/DL (ref 8.5–10.1)
CHLORIDE BLD-SCNC: 105 MMOL/L (ref 94–109)
CO2 SERPL-SCNC: 25 MMOL/L (ref 20–32)
CREAT SERPL-MCNC: 0.74 MG/DL (ref 0.52–1.04)
GFR SERPL CREATININE-BSD FRML MDRD: 79 ML/MIN/1.73M2
GLUCOSE BLD-MCNC: 95 MG/DL (ref 70–99)
POTASSIUM BLD-SCNC: 4.4 MMOL/L (ref 3.4–5.3)
SODIUM SERPL-SCNC: 135 MMOL/L (ref 133–144)

## 2023-01-02 PROCEDURE — 250N000013 HC RX MED GY IP 250 OP 250 PS 637: Performed by: INTERNAL MEDICINE

## 2023-01-02 PROCEDURE — 97110 THERAPEUTIC EXERCISES: CPT | Mod: GP

## 2023-01-02 PROCEDURE — 80048 BASIC METABOLIC PNL TOTAL CA: CPT | Performed by: INTERNAL MEDICINE

## 2023-01-02 PROCEDURE — 120N000001 HC R&B MED SURG/OB

## 2023-01-02 PROCEDURE — 97112 NEUROMUSCULAR REEDUCATION: CPT | Mod: GP

## 2023-01-02 PROCEDURE — 99232 SBSQ HOSP IP/OBS MODERATE 35: CPT | Performed by: INTERNAL MEDICINE

## 2023-01-02 PROCEDURE — 97535 SELF CARE MNGMENT TRAINING: CPT | Mod: GO

## 2023-01-02 PROCEDURE — 250N000013 HC RX MED GY IP 250 OP 250 PS 637: Performed by: STUDENT IN AN ORGANIZED HEALTH CARE EDUCATION/TRAINING PROGRAM

## 2023-01-02 PROCEDURE — 36415 COLL VENOUS BLD VENIPUNCTURE: CPT | Performed by: INTERNAL MEDICINE

## 2023-01-02 PROCEDURE — 97116 GAIT TRAINING THERAPY: CPT | Mod: GP

## 2023-01-02 RX ADMIN — ASPIRIN 81 MG: 81 TABLET, CHEWABLE ORAL at 08:12

## 2023-01-02 RX ADMIN — ATORVASTATIN CALCIUM 80 MG: 80 TABLET, FILM COATED ORAL at 20:19

## 2023-01-02 RX ADMIN — CLOPIDOGREL BISULFATE 75 MG: 75 TABLET ORAL at 08:12

## 2023-01-02 ASSESSMENT — ACTIVITIES OF DAILY LIVING (ADL)
ADLS_ACUITY_SCORE: 20
ADLS_ACUITY_SCORE: 20
ADLS_ACUITY_SCORE: 24
ADLS_ACUITY_SCORE: 20
ADLS_ACUITY_SCORE: 21
ADLS_ACUITY_SCORE: 20

## 2023-01-02 NOTE — PROGRESS NOTES
[facility]    Medicine Progress Note - Hospitalist Service    Date of Admission:  12/30/2022    Assessment & Plan   Inga Coats is a 82 year old female w/ PMH of of CAD status post three-vessel CABG, aortic stenosis requiring aortic valve replacement, hyperlipidemia, TIA, CKD 3 admitted on 12/30/2022 for TIA and subacute stroke.      Acute vs subacute left corona radiata stroke  Presents with with right hand discoordination, right upper extremity weakness and possible shuffling gait on the right lower extremity which resolved at time of admission.    *MRI brain showed subtle restricted diffusion in the left corona radiata which could represent an acute to subacute infarct.   * MRA neck- Patent arteries in the neck without evidence of dissection. 2. Apparent atherosclerotic disease of the right carotid bifurcation and proximal internal carotid artery which appears to cause approximately 50% stenosis by NASCET criteria  * loaded with plavix in ED, then started on ASA, Plavix.    * Recurrent symptoms of right upper ext ataxia,drift, weakness am of 12/31. Neurology contacted. Repeat CT/CTA head and neck CTA without changes (see report for detail)  *Per neuro, stroke likely due to intracranial atherosclerosis  * TTE-LVEF 65-70%, no thrombus seen in LV  * 1/1-reporting more weakness in right upper ext. Neuro evaluated on 1/1 due to flunctuating symptoms, If sxs persist then can get repeat MRI brain WO, SBP goal <220 a nd avoid hypotension      -Continue aspirin 81 mg daily  -Continue Plavix 75 mg daily x90 days  -PTA Lipitor increased to 80 mg daily, LDL 96  -received IVF on 1/1, discontinue.   - avoid hypotenstion  -30-day cardiac monitor at discharge  -Follow-up on [facility]  -PT/OT evaluation, rec ARU, social work following-discussed  -Telemetry  - neuro re-evaluated, monitor, if right upper ext weakness persists, MRI brain without per neurology   - right  sided weakness stable on 1/2    CAD status post three-vessel CABG  aortic stenosis s/p aortic valve replacement  -antiplatelet and statin therapy as above     Hyperlipidemia  -statin increased as above.  LDL 96    CKD 3  -baseline Cr 1.05     Hyperglycemia w/ prediabetes, newly identified this admission, A1c 6%  -OP follow up            Diet: Combination Diet Regular Diet; Moderate Consistent Carb (60 g CHO per Meal) Diet; 2 gm NA Diet; Low Saturated Fat Na <2400mg Diet    DVT Prophylaxis: Pneumatic Compression Devices  Mc Catheter: Not present  Lines: None     Cardiac Monitoring: ACTIVE order. Indication: Stroke, acute (48 hours)  Code Status: Full Code      Clinically Significant Risk Factors                                Disposition Plan  To ARU once placement found     Expected Discharge Date: 01/03/2023    Discharge Delays: Insurance Authorization needed  Destination: inpatient rehabilitation facility            Samantha Walker MD  Hospitalist Service  [facility]  Securely message with [employer] (more info)  Text page via [employer] Paging/Directory   ______________________________________________________________________    Interval History   Patient reports right sided weakness is about the same today and no change.   Denies headache, nausea, vomiting or abdominal pain.  Daughter is at bedside. Discussed.     Physical Exam   Vital Signs: Temp: 98.5  F (36.9  C) Temp src: Oral BP: (!) 140/59 Pulse: 71   Resp: 16 SpO2: 94 % O2 Device: None (Room air)    Weight: 135 lbs 0 oz    General Appearance: Alert, awake and no apparent distress  Respiratory: CTAB, no wheezing  Cardiovascular: regular rate and rhythm  GI: soft and non-tender  Skin: warm and dry       Medical Decision Making       MANAGEMENT DISCUSSED with the following over the past 24 hours: patient, RN, patient's daughter, social work   NOTE(S)/MEDICAL RECORDS REVIEWED over the past 24 hours: neurology note, nursing note  Tests  ORDERED & REVIEWED in the past 24 hours:  - BMP      Data     I have personally reviewed the following data over the past 24 hrs:    N/A  \   N/A   / N/A     135 105 17 / 95   4.4 25 0.74 \       Imaging results reviewed over the past 24 hrs:   No results found for this or any previous visit (from the past 24 hour(s)).

## 2023-01-02 NOTE — PLAN OF CARE
Goal Outcome Evaluation:  Pt here with L CVA. A&O x4 Neuros R side weakness, RUE drift and ataxia, RLE more unstable and weak when standing up. VSS. Tele NSR. Mod carb-low Na diet, thin liquids. Takes pills whole with water. Up with A1 GB W. Denies pain. Pt scoring green on the Aggression Stop Light Tool. Plan to . discharge to ARU with a heart monitor possibly tomorrow.

## 2023-01-02 NOTE — INTERIM SUMMARY
[facility] Pre-Admission Screen    Referral Source:  [facility] NEUROSCIENCE UNIT  CARDIAC SERVICES  Admit date to referring facility: 12/30/2022    Physical Medicine and Rehab Consult Completed: No    Rehab Diagnosis:    Stroke Ischemic 01.2 (R) Body Involvement (L) Brain; Acute vs subacute left corona radiata stroke    Justification for Acute Inpatient Rehabilitation  Inga Coats is a 82 year old female w/ PMH of of CAD status post three-vessel CABG, aortic stenosis requiring aortic valve replacement, hyperlipidemia, TIA, CKD 3 admitted on 12/30/2022 for TIA and subacute stroke. She has been noted to have fluctuating weakness, initially thought to be back to baseline, however now with return of symptoms. Work up for secondary or repeat stroke has been negative. Therapies are recommending [facility].     The patient is medically ready for transfer to [facility] for rehabilitation. Patient requires an intensive inpatient rehab program to address the following acute impairments:impaired activity tolerance, impaired balance, impaired cognition, impaired coordination, impaired judgement and safety awareness, impaired strength, impaired tone, impaired weight shifting and impulsiveness    Current Active Medical Management Needs/Risks for Clinical Complications  The patient requires the high level of rehabilitation physician supervision that accompanies the provision of intensive rehabilitation therapy.  The patient needs the services of the rehabilitation physician to assess the patient medically and functionally and to modify the course of treatment as needed to maximize the patient's capacity to benefit from the rehabilitation process. The patient requires physician involvement at least 3 days per week to manage:   Neurologic: in the setting of recent stroke with waxing and waning symptoms, upper extremity drift and ataxia identified 1/1/2023, assess neuros regularly-- currently  on aspirin, Plavix  Cardiac: in the setting of HLD, CAD with prior CABG, prior AVR, at risk for further strokes, allowed permissive HTN currently with eventual BP parameter goals 130/80; /68 in past 24 hours-- currently on atorvastatin, will need 30 day cardiac monitor upon discharge  Endocrine: in the setting of new dx of prediabetes with A1c 6%, educate in lifestyle modification    The patient is at risk for falls in the setting of right sided hemiplegia; repeat strokes in the setting of significant cardiac history; hyperglycemia in the setting of prediabetes    Past Medical/Surgical History   Surgery in the past 100 days: Unknown   Additional relevant past medical history: CAD status post three-vessel CABG, aortic stenosis requiring aortic valve replacement, hyperlipidemia, TIA, CKD 3    Level of Functioning Prior to Admission:    LIVING ENVIRONMENT   People in Home: alone (2 cats)   Current Living Arrangements: apartment, independent living facility   Home Accessibility: stairs to enter home (Pt mcdaniel in lower garage, prefers to use stairs.)    Number of Stairs, Main Entrance: greater than 10 stairs    Stair Railings, Main Entrance: railings on both sides of stairs      Transportation Anticipated: family or friend will provide   Living Environment Comments: Pt resides alone in apartment. Not using AD at baseline. BR SET UP: Walk in shower, grab bars; stadard toilet.    SELF-CARE   Usual Activity Tolerance: moderate   Regular Exercise: Yes    Activity/Exercise Type: walking    Exercise Amount/Frequency: 2 times/wk (treadmill in winter 2x/week, walks outside in summer daily)   Equipment Currently Used at Home: none   Activity/Exercise/Self-Care Comment: Pt ind all ADLs baseline.  Additional Comments: Per CM, the patient has 3 daughters who live locally who are able to support her after ARC.     Level of Function: GG Scale (Section GG Functional Ability and Goals; CMS's MCKEON Version 3.0 Manual effective  10.1.2019):  PT Current Function Goals for Rehab   Bed Rolling 4 Supervision or touching assitance 6 Independent   Supine to Sit 4 Supervision or touching assitance 6 Independent   Sit to Stand 3 Partial/moderate assistance 6 Independent   Transfer 3 Partial/moderate assistance 6 Independent   Ambulation 3 Partial/moderate assistance 6 Independent   Stairs 88 Not attempted due to safety 6 Independent     OT Current Function Goals for Rehab   Feeding 5 Setup or clean-up assistance 6 Independent   Grooming 3 Partial/moderate assistance 6 Independent   Bathing 3 Partial/moderate assistance 6 Independent   Upper Body Dressing Not completed 6 Independent   Lower Body Dressing 3 Partial/moderate assistance 6 Independent   Toileting 3 Partial/moderate assistance 6 Independent   Toilet Transfer 3 Partial/moderate assistance 6 Independent   Tub/Shower Transfer 88 Not attempted due to safety 6 Independent   Cognition Impaired Independent     SLP Current Function Goals for Rehab   Swallow Not Impaired Not applicable   Communication Not Assessed Communicate basic needs effectively       Current Diet:  0-Thin and 7-Regular    Summary Statement:  In PT the patient performs ambulation x 100' with FWW and CGA-min assist x1. Improved swing phase when utilizing FWW. Second and third bouts of ambulation with no assistive device and min assist x1 for 100' each. Demonstrating decreased step length and swing phase on R. Trendelenburg gait pattern noted. Cues for increased step length/swing phase on R. Fatiguing quickly and instability noted due to RLE trailing behind while ambulating. Needing assist to maintain upright and cues to reduce gait speed and focus on improved RLE swing.  In OT the patient requires Min A to stand from toilet w/ VC for using grab bar. Pt able to complete aviva cares w/ Min A for balance. She then stood at sink to wash hands and brush teeth w/ CGA-Min A at times for balance when BUE involved in task. Pt continues  to have decreased coordination and strength in RUE - encouraged pt to continue to use during functional tasks ans educated on how to incorporate. Increased time/effort spent w/ tooth brushing but pt was able to complete task w/ Mod I. Pt then walked to EOB using FWW and CGA. Pt sat EOB and brushed hair/washed face w/ set up assist. She then requested to do her makeup. Increased time spent during task to coordinate movements. Educated pt on neuro re-ed principles and she demo'd good understanding. Pt able to apply fundation and blush with brush and use eye brow pencil w/ Min A from writer. Pt alternated between using L and RUE for task. Pt rested R elbow on table at times for additional support. Pt does become frustrated at times - discussed coping strategies. Pt then stood w/ Min A, ambulated to chair w/ CGA using FWW. Left w/ all needs met, in chair w/ alarm on.  In SLP, the patient did not require swallow intervention while admitted to the hospital. She has been noted to have possible cognitive deficits per OT notes stating she is able to answer 2/3 complex questions, or name 2/3 items for STM recall. The patient will benefit from a full cognitive/linguistic assessment while on ARC and treatment if deficits identified.     Expected Therapies and Services Required During Inpatient Rehab Admission  Intensity of Therapy: Patient requires intensive therapies not available in a lesser level of care. Patient is motivated, making gains, and can tolerate 3 hours of therapy a day.  Physical Therapy: 60 minutes per day, 7 days a week for 8 days  Occupational Therapy: 60 minutes per day, 7 days a week for 8 days  Speech and Language Therapy: 60 minutes per day, 7 days a week for 8 days  Rehabilitation Nursing Needs: Patient requires 24 hour Rehab Nursing to manage vitals, medication education, carryover of new rehab techniques, care coordination, blood sugar management, diabetes education, assess neurologic status, stroke  education and provide safe environment for patient at falls risk.    Precautions/restrictions/special needs:   Precautions: fall precautions   Restrictions: NA   Special Needs: NA    Expected Level of Improvement: Anticipate with intensive therapies, close medical management, and rehabilitative nursing care the patient will improve strength, balance, tolerance to activity, safety, compensatory strategies to ensure Mod I with basic mobility and ADL performance to allow return home with family A and ongoing OP therapies.   Expected Length of time to achieve: 8 days    Anticipated Discharge Needs:  Anticipated Discharge Destination: Home  Anticipated Discharge Support: Family member  24/7 support available : Unknown  Identified caregiver(s):  3 daughters who live locally  Anticipated Discharge Needs: Home with outpatient therapy    Identified challenges/barriers:  Pt lives alone but has involved family who are available to assist.    Liaison signature/date/time:    Physician statement of review and agreement:  I have reviewed and am in agreement of the need for IRF stay to address above functional and medical needs. In addition to above statements address, Patient requires intensive active and ongoing therapeutic intervention and multiple therapies; Patient requires medical supervision; Expected to actively participate in the intensive rehab program; Sufficiently stable to actively participate; Expectation for measurable improvement in functional capacity or adaption to impairments.    MD signature/date/time:

## 2023-01-02 NOTE — CONSULTS
Care Management Initial Consult    General Information  Assessment completed with: Patient, Children, daughter Marisol  Type of CM/SW Visit: Initial Assessment    Primary Care Provider verified and updated as needed: Yes   Readmission within the last 30 days: no previous admission in last 30 days      Reason for Consult: discharge planning  Advance Care Planning: Advance Care Planning Reviewed: no concerns identified          Communication Assessment  Patient's communication style: spoken language (English or Bilingual)    Hearing Difficulty or Deaf: no   Wear Glasses or Blind: yes    Cognitive  Cognitive/Neuro/Behavioral: WDL  Level of Consciousness: alert     Orientation: oriented x 4  Mood/Behavior: calm, cooperative  Best Language: 0 - No aphasia  Speech: clear, spontaneous    Living Environment:   People in home: alone     Current living Arrangements: apartment      Able to return to prior arrangements:         Family/Social Support:  Care provided by: self  Provides care for: no one  Marital Status:   Children          Description of Support System: Supportive    Support Assessment: Adequate family and caregiver support    Current Resources:   Patient receiving home care services: No     Community Resources:    Equipment currently used at home: none  Supplies currently used at home:      Employment/Financial:  Employment Status:          Financial Concerns:             Lifestyle & Psychosocial Needs:  Social Determinants of Health     Tobacco Use: Low Risk      Smoking Tobacco Use: Never     Smokeless Tobacco Use: Never     Passive Exposure: Not on file   Alcohol Use: Not on file   Financial Resource Strain: Not on file   Food Insecurity: Not on file   Transportation Needs: Not on file   Physical Activity: Not on file   Stress: Not on file   Social Connections: Not on file   Intimate Partner Violence: Not on file   Depression: Not on file   Housing Stability: Not on file       Functional Status:  Prior  to admission patient needed assistance: no    Mental Health Status:          Chemical Dependency Status:                Values/Beliefs:  Spiritual, Cultural Beliefs, Yazidi Practices, Values that affect care:                 Additional Information:  CM consult for discharge planning.  Patient admitted for L cortes radiata stroke likely due to intracranial atherosclerosis, fluctuating weakness. Met with patient and her daughter Marisol.  They were aware of ARU recommendation and location of facility.  Patient indicated that she was very motivated for rehab and to return home afterwards.  Per her daughter, the family will be able to provide support post ARU.  Patient would like transportation arranged and was informed of the cost.    ARU has been following and SW previously sent referral.  ARU will need to obtain insurance auth.    Addendum @ 1446:  ARU requests tentative ride (due to pending insurance auth) be set up for tomorrow.  [facility] transport scheduled for 1/3 at 1330.    Birgit Cano RN, BSN, PHN  Inpatient Care Coordination  [facility]  Phone: [phone]

## 2023-01-03 ENCOUNTER — APPOINTMENT (OUTPATIENT)
Dept: CARDIOLOGY | Facility: CLINIC | Age: 83
DRG: 065 | End: 2023-01-03
Attending: INTERNAL MEDICINE
Payer: COMMERCIAL

## 2023-01-03 ENCOUNTER — APPOINTMENT (OUTPATIENT)
Dept: PHYSICAL THERAPY | Facility: CLINIC | Age: 83
DRG: 057 | End: 2023-01-03
Attending: PHYSICAL MEDICINE & REHABILITATION
Payer: COMMERCIAL

## 2023-01-03 ENCOUNTER — APPOINTMENT (OUTPATIENT)
Dept: OCCUPATIONAL THERAPY | Facility: CLINIC | Age: 83
DRG: 065 | End: 2023-01-03
Payer: COMMERCIAL

## 2023-01-03 ENCOUNTER — HOSPITAL ENCOUNTER (INPATIENT)
Facility: CLINIC | Age: 83
LOS: 11 days | Discharge: HOME HEALTH | DRG: 057 | End: 2023-01-14
Attending: PHYSICAL MEDICINE & REHABILITATION | Admitting: PHYSICAL MEDICINE & REHABILITATION
Payer: COMMERCIAL

## 2023-01-03 VITALS
SYSTOLIC BLOOD PRESSURE: 153 MMHG | WEIGHT: 135 LBS | OXYGEN SATURATION: 96 % | RESPIRATION RATE: 17 BRPM | HEIGHT: 65 IN | DIASTOLIC BLOOD PRESSURE: 75 MMHG | HEART RATE: 82 BPM | TEMPERATURE: 98.6 F | BODY MASS INDEX: 22.49 KG/M2

## 2023-01-03 DIAGNOSIS — I63.9 CEREBROVASCULAR ACCIDENT (CVA), UNSPECIFIED MECHANISM (H): ICD-10-CM

## 2023-01-03 DIAGNOSIS — E78.5 HYPERLIPIDEMIA LDL GOAL <100: ICD-10-CM

## 2023-01-03 PROCEDURE — 250N000013 HC RX MED GY IP 250 OP 250 PS 637: Performed by: INTERNAL MEDICINE

## 2023-01-03 PROCEDURE — 99223 1ST HOSP IP/OBS HIGH 75: CPT | Mod: FS | Performed by: PHYSICIAN ASSISTANT

## 2023-01-03 PROCEDURE — 97535 SELF CARE MNGMENT TRAINING: CPT | Mod: GO | Performed by: OCCUPATIONAL THERAPIST

## 2023-01-03 PROCEDURE — 93246 EXT ECG>7D<15D RECORDING: CPT

## 2023-01-03 PROCEDURE — 99239 HOSP IP/OBS DSCHRG MGMT >30: CPT | Performed by: INTERNAL MEDICINE

## 2023-01-03 PROCEDURE — 97530 THERAPEUTIC ACTIVITIES: CPT | Mod: GP

## 2023-01-03 PROCEDURE — 97162 PT EVAL MOD COMPLEX 30 MIN: CPT | Mod: GP

## 2023-01-03 PROCEDURE — 128N000003 HC R&B REHAB

## 2023-01-03 PROCEDURE — 93248 EXT ECG>7D<15D REV&INTERPJ: CPT | Performed by: INTERNAL MEDICINE

## 2023-01-03 PROCEDURE — 97530 THERAPEUTIC ACTIVITIES: CPT | Mod: GO | Performed by: OCCUPATIONAL THERAPIST

## 2023-01-03 PROCEDURE — 250N000013 HC RX MED GY IP 250 OP 250 PS 637: Performed by: PHYSICIAN ASSISTANT

## 2023-01-03 RX ORDER — AMOXICILLIN 250 MG
1-2 CAPSULE ORAL 2 TIMES DAILY PRN
Status: DISCONTINUED | OUTPATIENT
Start: 2023-01-03 | End: 2023-01-14 | Stop reason: HOSPADM

## 2023-01-03 RX ORDER — VIT C/E/ZN/COPPR/LUTEIN/ZEAXAN 60 MG-6 MG
1 CAPSULE ORAL DAILY
Status: DISCONTINUED | OUTPATIENT
Start: 2023-01-04 | End: 2023-01-14 | Stop reason: HOSPADM

## 2023-01-03 RX ORDER — CLOPIDOGREL BISULFATE 75 MG/1
75 TABLET ORAL DAILY
Status: DISCONTINUED | OUTPATIENT
Start: 2023-01-04 | End: 2023-01-14 | Stop reason: HOSPADM

## 2023-01-03 RX ORDER — AMLODIPINE BESYLATE 5 MG/1
5 TABLET ORAL DAILY
Status: ON HOLD | DISCHARGE
Start: 2023-01-04 | End: 2023-01-12

## 2023-01-03 RX ORDER — CLOPIDOGREL BISULFATE 75 MG/1
75 TABLET ORAL DAILY
Qty: 86 TABLET | Refills: 0 | Status: ON HOLD | DISCHARGE
Start: 2023-01-04 | End: 2023-01-12

## 2023-01-03 RX ORDER — VITAMIN B COMPLEX
50 TABLET ORAL DAILY
Status: DISCONTINUED | OUTPATIENT
Start: 2023-01-04 | End: 2023-01-14 | Stop reason: HOSPADM

## 2023-01-03 RX ORDER — AMLODIPINE BESYLATE 5 MG/1
5 TABLET ORAL DAILY
Status: DISCONTINUED | OUTPATIENT
Start: 2023-01-04 | End: 2023-01-11

## 2023-01-03 RX ORDER — ATORVASTATIN CALCIUM 80 MG/1
80 TABLET, FILM COATED ORAL EVERY EVENING
Status: DISCONTINUED | OUTPATIENT
Start: 2023-01-03 | End: 2023-01-14 | Stop reason: HOSPADM

## 2023-01-03 RX ORDER — ACETAMINOPHEN 325 MG/1
325-650 TABLET ORAL EVERY 4 HOURS PRN
Status: DISCONTINUED | OUTPATIENT
Start: 2023-01-03 | End: 2023-01-14 | Stop reason: HOSPADM

## 2023-01-03 RX ORDER — MULTIPLE VITAMINS W/ MINERALS TAB 9MG-400MCG
1 TAB ORAL DAILY
Status: DISCONTINUED | OUTPATIENT
Start: 2023-01-04 | End: 2023-01-05

## 2023-01-03 RX ORDER — POLYETHYLENE GLYCOL 3350 17 G/17G
17 POWDER, FOR SOLUTION ORAL DAILY PRN
Status: DISCONTINUED | OUTPATIENT
Start: 2023-01-03 | End: 2023-01-14 | Stop reason: HOSPADM

## 2023-01-03 RX ORDER — ATORVASTATIN CALCIUM 80 MG/1
80 TABLET, FILM COATED ORAL EVERY EVENING
Status: ON HOLD | DISCHARGE
Start: 2023-01-03 | End: 2023-01-12

## 2023-01-03 RX ORDER — ASPIRIN 81 MG/1
81 TABLET, CHEWABLE ORAL DAILY
Status: DISCONTINUED | OUTPATIENT
Start: 2023-01-04 | End: 2023-01-14 | Stop reason: HOSPADM

## 2023-01-03 RX ORDER — AMLODIPINE BESYLATE 5 MG/1
5 TABLET ORAL DAILY
Status: DISCONTINUED | OUTPATIENT
Start: 2023-01-03 | End: 2023-01-03 | Stop reason: HOSPADM

## 2023-01-03 RX ADMIN — ATORVASTATIN CALCIUM 80 MG: 80 TABLET, FILM COATED ORAL at 19:42

## 2023-01-03 RX ADMIN — AMLODIPINE BESYLATE 5 MG: 5 TABLET ORAL at 08:03

## 2023-01-03 RX ADMIN — CLOPIDOGREL BISULFATE 75 MG: 75 TABLET ORAL at 08:03

## 2023-01-03 RX ADMIN — ASPIRIN 81 MG: 81 TABLET, CHEWABLE ORAL at 08:03

## 2023-01-03 ASSESSMENT — ACTIVITIES OF DAILY LIVING (ADL)
WALKING_OR_CLIMBING_STAIRS: AMBULATION DIFFICULTY, ASSISTANCE 1 PERSON;TRANSFERRING DIFFICULTY, ASSISTANCE 1 PERSON
ADLS_ACUITY_SCORE: 21
IADLS,_PREVIOUS_FUNCTIONAL_LEVEL: INDEPENDENT
ADLS_ACUITY_SCORE: 32
TOILETING_ISSUES: NO
ADLS_ACUITY_SCORE: 32
ADLS_ACUITY_SCORE: 32
WEAR_GLASSES_OR_BLIND: YES
ADLS_ACUITY_SCORE: 21
FALL_HISTORY_WITHIN_LAST_SIX_MONTHS: NO
NUMBER_OF_TIMES_PATIENT_HAS_FALLEN_WITHIN_LAST_SIX_MONTHS: 0
ADLS_ACUITY_SCORE: 21
DIFFICULTY_EATING/SWALLOWING: NO
ADLS_ACUITY_SCORE: 21
ADLS_ACUITY_SCORE: 35
DOING_ERRANDS_INDEPENDENTLY_DIFFICULTY: YES
VISION_MANAGEMENT: READING GLASSES
BADLS,_PREVIOUS_FUNCTIONAL_LEVEL: INDEPENDENT
DRESSING/BATHING_DIFFICULTY: YES
CHANGE_IN_FUNCTIONAL_STATUS_SINCE_ONSET_OF_CURRENT_ILLNESS/INJURY: YES
ADLS_ACUITY_SCORE: 32
ADLS_ACUITY_SCORE: 24
CONCENTRATING,_REMEMBERING_OR_MAKING_DECISIONS_DIFFICULTY: NO
DRESSING/BATHING: DRESSING DIFFICULTY, ASSISTANCE 1 PERSON;BATHING DIFFICULTY, ASSISTANCE 1 PERSON
ADLS_ACUITY_SCORE: 21
ADLS_ACUITY_SCORE: 21
WALKING_OR_CLIMBING_STAIRS_DIFFICULTY: YES

## 2023-01-03 NOTE — PLAN OF CARE
FOCUS/GOAL  Bowel management, Bladder management, and Mobility    ASSESSMENT, INTERVENTIONS AND CONTINUING PLAN FOR GOAL:    Pt was admitted to ARU via HE W/C at 1430.  Room/ unit orientation completed. VSS except BP elevated.  Pt is alert and oriented x4, forgetful, used call light and made needs known appropriately.  Needing min Ax1 with FWW for functional mobility in room.   Denies pain, chest pain or SOB. Zio patch on left chest.  Continent of bowel and bladder, used toilet. LBM today. PVR 25ml.  Bed/chair alarms on for safety.   Continue with POC.

## 2023-01-03 NOTE — PROGRESS NOTES
[facility]    Medicine Progress Note - Hospitalist Service    Date of Admission:  12/30/2022    Assessment & Plan   Inga Coats is a 82 year old female w/ PMH of of CAD status post three-vessel CABG, aortic stenosis requiring aortic valve replacement, hyperlipidemia, TIA, CKD 3 admitted on 12/30/2022 for TIA and subacute stroke.      Acute vs subacute left corona radiata stroke  Presents with with right hand discoordination, right upper extremity weakness and possible shuffling gait on the right lower extremity which resolved at time of admission.    *MRI brain showed subtle restricted diffusion in the left corona radiata which could represent an acute to subacute infarct.   * MRA neck- Patent arteries in the neck without evidence of dissection. 2. Apparent atherosclerotic disease of the right carotid bifurcation and proximal internal carotid artery which appears to cause approximately 50% stenosis by NASCET criteria  * loaded with plavix in ED, then started on ASA, Plavix.    * Recurrent symptoms of right upper ext ataxia,drift, weakness am of 12/31. Neurology contacted. Repeat CT/CTA head and neck CTA without changes (see report for detail)  *Per neuro, stroke likely due to intracranial atherosclerosis  * TTE-LVEF 65-70%, no thrombus seen in LV  * 1/1-reporting more weakness in right upper ext. Neuro evaluated on 1/1 due to flunctuating symptoms, If sxs persist then can get repeat MRI brain WO, SBP goal <220 and avoid hypotension  1/2-1/3-- right upper ext weakness has been stable    -Continue aspirin 81 mg daily  -Continue Plavix 75 mg daily x90 days (started on 12/31)  -PTA Lipitor increased to 80 mg daily, LDL 96  -avoid hypotenstion. See below regarding bp management  -30-day cardiac monitor at discharge  -Follow-up on [facility]  -PT/OT evaluation, rec ARU  - neuro re-evaluated, monitor, if right upper ext weakness persists, MRI brain without per  neurology. Currently stable.     HTN:  -  BP elevated, started amlodipine 5mg daily on 1/3--> needs to avoid rapid decrease in bp and needs gradual titration of medication for optimal bp control. Long term OP goal is <130/80      CAD status post three-vessel CABG  aortic stenosis s/p aortic valve replacement  -antiplatelet and statin therapy as above     Hyperlipidemia  -statin increased as above.  LDL 96    CKD 3  -baseline Cr 1.05     Hyperglycemia w/ prediabetes, newly identified this admission, A1c 6%  -OP follow up            Diet: Combination Diet Regular Diet; Moderate Consistent Carb (60 g CHO per Meal) Diet; 2 gm NA Diet; Low Saturated Fat Na <2400mg Diet  Diet    DVT Prophylaxis: Pneumatic Compression Devices  Mc Catheter: Not present  Lines: None     Cardiac Monitoring: ACTIVE order. Indication: Stroke, acute (48 hours)  Code Status: Full Code      Clinically Significant Risk Factors                                Disposition Plan  To ARU once insurance authorization obtained. Possible today     Expected Discharge Date: 01/03/2023,  1:30 PM  Discharge Delays: Insurance Authorization needed  Destination: inpatient rehabilitation facility            [facility] Guillermo Walker MD  Hospitalist Service  [facility]  Securely message with [employer] (more info)  Text page via [employer] Paging/Directory   ______________________________________________________________________    Interval History   Patient reports right sided weakness is about the same today and no change.   Denies n/v/headache, shortness of breath.     Physical Exam   Vital Signs: Temp: 98.1  F (36.7  C) Temp src: Oral BP: (!) 174/65 Pulse: 65   Resp: 16 SpO2: 96 % O2 Device: None (Room air)    Weight: 135 lbs 0 oz    General Appearance: Alert, awake and no apparent distress  Respiratory: CTAB, no wheezing  Cardiovascular: regular rate and rhythm  GI: soft and non-tender  Skin: warm and dry       Medical Decision Making        MANAGEMENT DISCUSSED with the following over the past 24 hours: patient, RN, social work   NOTE(S)/MEDICAL RECORDS REVIEWED over the past 24 hours: nursing note, care transition team note      Data     I have personally reviewed the following data over the past 24 hrs:    N/A  \   N/A   / N/A     135 105 17 / 95   4.4 25 0.74 \       Imaging results reviewed over the past 24 hrs:   No results found for this or any previous visit (from the past 24 hour(s)).

## 2023-01-03 NOTE — PLAN OF CARE
Discharge Planner Post-Acute Rehab PT:     Discharge Plan: Home with OP PT, Family to assist with driving    Precautions: Falls, alarms    Current Status:  Bed Mobility: NT  Transfer: MIN A   Gait: MIN A with WW  Stairs: NT  Balance: NT    Assessment:  Pt is below baseline for functional mobility s/p CVA resulting in R side weakness and incoordination. At this time, she requires walker for mobility. She lives alone and will need 10 days to achieve safety with mobility and IADLs.    Other Barriers to Discharge (DME, Family Training, etc):   Family assist for driving  Initial supervision 3-7 days to ensure safe transition

## 2023-01-03 NOTE — PLAN OF CARE
Neuro: RUE weak. Ataxic, RLE strong but slightly weaker than left. Slow and deliberate shin.   Cardio: NSR, BP WDL. Discharge on cardiac monitor  Resp: LS clear RA  GI: Tolerating diet without issue. BS active  : Voiding without issue. Expressing more frequency  Skin: CDI  Pain: Denies  Activity: A1 GB walker.  Transferred to ARU today.

## 2023-01-03 NOTE — PROGRESS NOTES
"   01/03/23 1503   Appointment Info   Signing Clinician's Name / Credentials (PT) Charmaine Gabriel DPT   Rehab Comments (PT) Eval completed      Language English   Living Environment   People in Home alone   Current Living Arrangements apartment   Home Accessibility no concerns   Living Environment Comments Lives in accessible apartment no stairs. Was driving and retired.   Self-Care   Usual Activity Tolerance good   Current Activity Tolerance moderate   Fall history within last six months yes   Number of times patient has fallen within last six months 0   Activity/Exercise/Self-Care Comment Has exercise and activity rountine at home including treadmill walking. Lives in accessible apartment with 0 BETTIE. Drives and performs IADLs INDly. Has 3 children within 30-45 minutes of the patient.   Post-Acute Assessment Only   Post-Acute Functional Assessment See below   Previous Level of Function/Home Environm   Bathing/Grooming, Premorbid Functional Level independent   Dressing, Premorbid Functional Level independent   Eating/Feeding, Premorbid Functional Level independent   Toileting, Premorbid Functional Level independent   BADLs, Premorbid Functional Level independent   IADLs, Premorbid Functional Level independent   Bed Mobility, Premorbid Functional Level independent   Transfers, Premorbid Functional Level independent   Household Ambulation, Premorbid Functional Level independent   Stairs, Premorbid Functional Level independent   Community Ambulation, Premorbid Functional Level independent   General Information   Onset of Illness/Injury or Date of Surgery 12/30/22   Referring Physician Dr. Edgard Bryant   Patient/Family Therapy Goals Statement (PT) \"To get better\"   Pertinent History of Current Problem (include personal factors and/or comorbidities that impact the POC) Per chart: 82 year old right hand dominant female with a past medical history of CAD s/p CABG x3, aortic stenosis s/p AVR, TIA, " hyperlipidemia, and CKD 3 who was admitted on 12/30/22 with subacute left corona radiata stroke with hospital course complicated by elevated BP, new prediabetes diagnosis, and fluctuating stroke symptoms.  She is now admitted to ARU on 1/3/23 for multidisciplinary rehabilitation and ongoing medical management.   Existing Precautions/Restrictions fall   Cognition   Cognitive Status Comments Forgetful at times, needs alarms.   Pain Assessment   Patient Currently in Pain No   Integumentary/Edema   Integumentary/Edema no deficits were identifed   Posture    Posture Not impaired   Range of Motion (ROM)   Range of Motion ROM is WFL   Strength (Manual Muscle Testing)   Strength Comments Generally strong on L side. R  3/5 and R hip, knee, ankle 4/5.   Balance   Balance Comments Impaired dynamically d/t slow reaction time and RLE incoordination.   Sensory Examination   Sensory Perception Comments Some functional neglect noted with mobility, but sensory testing intact and equal bilaterally.   Coordination   Coordination Comments Slow Becka in RUE>RLE   Muscle Tone   Muscle Tone no deficits were identified   Clinical Impression   Criteria for Skilled Therapeutic Intervention Yes, treatment indicated   PT Diagnosis (PT) Impaired force production, impaired coordination   Influenced by the following impairments see clinical impression comments   Functional limitations due to impairments see clinical impression comments   Clinical Presentation (PT Evaluation Complexity) Evolving/Changing   Clinical Presentation Rationale Multisystem and multifocal deficits   Clinical Decision Making (Complexity) moderate complexity   Planned Therapy Interventions (PT) balance training;bed mobility training;E-stim;gait training;neuromuscular re-education;home exercise program;groups;patient/family education;motor coordination training;ROM (range of motion);stair training;strengthening;orthotic fitting/training;home program guidelines;risk factor  education;progressive activity/exercise;wheelchair management/propulsion training;transfer training   Anticipated Equipment Needs at Discharge (PT) walker, rolling   Risk & Benefits of therapy have been explained evaluation/treatment results reviewed;care plan/treatment goals reviewed;risks/benefits reviewed;current/potential barriers reviewed;participants voiced agreement with care plan;participants included;patient   Clinical Impression Comments Pt is below baseline for functional mobility s/p CVA resulting in R side weakness and incoordination. At this time, she requires walker for mobility. She lives alone and will need 10 days to achieve safety with mobility and IADLs.   PT Total Evaluation Time   PT Eval, Moderate Complexity Minutes (14154) 30   Physical Therapy Goals   PT Frequency Daily   PT: Bed Mobility Supine to/from sit;Independent   PT: Transfers Independent;Sit to/from stand;Bed to/from chair   PT: Gait Modified independent;Rolling walker;Greater than 200 feet   PT: Goal 1 Car transfer into family vehicle, SBA   Interventions   Interventions Quick Adds Therapeutic Activity   Therapeutic Activity   Therapeutic Activities: dynamic activities to improve functional performance Minutes (67059) 10   Treatment Detail/Skilled Intervention PT: initial GG assessment and room safety assessment. Needs chair and bed alarms, as well as walker. See below.   PT Discharge Planning   PT Plan PT: stairs and car for GG, RUBIO, TUG   Total Session Time   Timed Code Treatment Minutes 10   Total Session Time (sum of timed and untimed services) 40   Post Acute Settings Only   What unit is patient on? Acute Rehab   Chair/bed-to-chair Transfer   Complete independence for chair-bed-to-chair transfer no   Describe performance PT: MIN A no device stand pivot   Sit to Stand   Physical Assistance Level Contact guard assist   Sitting to Standing CARE Score 4   Locomotion   Locomotion Comment PT: MIN A x 75 ft in room and halls. No AD,  unsteady with R toe drag. Tolerates fair with assist at trunk to stabilize.   Walk 10 Feet   Physical Assistance Level Less than 25%   Walk 10 Ft. CARE Score 3   Walk 50 Feet with Two Turns   Physical Assistance Level Less than 25%   Walk 50 Ft. CARE Score 3

## 2023-01-03 NOTE — PLAN OF CARE
Reason for Admission: L CVA     Cognitive/Mentation: A&O x4  Neuros/CMS: Intact ex R weakness 4/5, RUE drift/ataxia  VS: Stable on RA   Tele: NSR  GI: BS active, + flatus. Continent. LBM 1/3.   : Voiding. Continent.  Pulmonary: LS clear.  Pain: Denies     Drains/Lines: PIV SL  Skin: Intact  Activity: Assist x1 GB/W  Diet: Regular with thin liquids. Takes pills whole.      Therapies recs: ARU  Discharge: Likely today (1/3) at 13:30 with WC transport     Aggression Stoplight Tool: Green     End of shift summary: No acute changes overnight. Patient will discharge with 30 day event monitor.

## 2023-01-03 NOTE — H&P
[facility]   Acute Rehabilitation Unit  Admission History and Physical    CHIEF COMPLAINT   Stroke Ischemic 01.2 (R) Body Involvement (L) Brain; Acute vs subacute left corona radiata stroke     HISTORY OF PRESENT ILLNESS  Inga Coats is a 82 year old right hand dominant female with past medical history of CAD s/p CABG x3, aortic stenosis s/p AVR, TIA, hyperlipidemia, and CKD 3 who presented on 12/30/22 with right-sided weakness lasting approximately 1 hour, with symptoms improving even in ED.  MRI brain revealed acute to subacute infarct in left corona radiata.  MRA head with moderate left MCA stenosis and ~50% right carotid stenosis.  Patient was hypertension in ED with BPs 160-180s/70s-80s.  Patient was admitted for further evaluation and management.    On 12/31, patient had recurrent right upper extremity drift and ataxia after having previously resolved.  Repeat CT/CTA head and neck CTA without changes.  Patient continued to have fluctuating symptoms, per neurology,  attributed to know left corona radiata stroke.  Recommended to continue plan of care and avoid hypotension.    Additional stroke evaluation with echo with EF 65-70%, LDL 96, A1c 6.0% (new prediabetes diagnosis).  PTA statin dose was increased, and Plavix added to PTA aspirin for 90 days of DAPT.    Hospital course was further complicated by elevated BP (started cautiously on amlodipine, goal for gradual reduction).    During acute hospitalization, patient was seen and evaluated by PT and OT, who collectively recommended that patient would benefit from ongoing therapies in the acute inpatient rehabilitation setting.      In review of the therapy notes, patient is currently ambulating 100' with FWW with CGA to min A.  She needs min A to stand from toilet, min A for pericares, CGA to min A for hygiene and grooming standing at sink.  OT noting possible cognitive deficits and patient will benefit from  "full cognitive/linguistic assessment while on ARC and treatment if deficits identified.     Upon arrival to the rehab unit, patient reports that she is feeling well overall.  She notes ongoing weakness and incoordination in both her right arm/hand and right foot/leg.  While symptoms have fluctuated somewhat during her course, she notes they have been fairly stable now for 2 days.  She also notes she is maybe \"not quite as sharp\" cognitively as before her stroke.  She reports that she is not much of a water-drinker at baseline, has been drinking more this admission with resulting urinary frequency.  Otherwise denies any changes from prior to admission.  Last BM was earlier today.    PAST MEDICAL HISTORY  Reviewed and updated in Epic.  No past medical history on file.    SURGICAL HISTORY  Reviewed and updated in Epic.  Past Surgical History:   Procedure Laterality Date     CARDIAC SURGERY         SOCIAL HISTORY  Reviewed and updated in Epic.  Marital Status: single  Living situation: lives alone in apartment (independent living facility) with elevator access (though typically uses stairs by choice)  Family support: patient has 4 adult children and 7 grandchildren (most of who are adults).  3 daughters live locally who are able to support her after ARU as needed, though patient would like to be as independent as possible.  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  Tobacco use: none  Alcohol use: occasional, about 2x/month  Illicit drug use: nond  Social History     Socioeconomic History     Marital status:      Spouse name: Not on file     Number of children: Not on file     Years of education: Not on file     Highest education level: Not on file   Occupational History     Not on file   Tobacco Use     Smoking status: Never     Smokeless tobacco: Never   Substance and Sexual Activity     Alcohol use: Not Currently     Drug use: Never "     Sexual activity: Not on file   Other Topics Concern     Not on file   Social History Narrative     Not on file     Social Determinants of Health     Financial Resource Strain: Not on file   Food Insecurity: Not on file   Transportation Needs: Not on file   Physical Activity: Not on file   Stress: Not on file   Social Connections: Not on file   Intimate Partner Violence: Not on file   Housing Stability: Not on file       FAMILY HISTORY  Reviewed and updated in Epic.  No family history on file.      PRIOR FUNCTIONAL HISTORY   Pt was independent with all ADLs/IADLs, transfers, mobility and gait.      MEDICATIONS  Scheduled meds  Medications Prior to Admission   Medication Sig Dispense Refill Last Dose     Apoaequorin (PREVAGEN) 10 MG CAPS Take 1 capsule by mouth daily        aspirin (ASA) 81 MG chewable tablet Take 81 mg by mouth daily        Misc Natural Products (OSTEO BI-FLEX ADV TRIPLE ST PO) Take 2 capsules by mouth daily        multivitamin  with lutein (OCUVITE WITH LUTEIN) CAPS per capsule Take 1 capsule by mouth daily        multivitamin (CENTRUM SILVER) tablet Take 1 tablet by mouth daily        vitamin D3 (CHOLECALCIFEROL) 50 mcg (2000 units) tablet Take 1 tablet by mouth daily        [DISCONTINUED] atorvastatin (LIPITOR) 40 MG tablet Take 40 mg by mouth daily          ALLERGIES     Allergies   Allergen Reactions     Shellfish-Derived Products Anaphylaxis         REVIEW OF SYSTEMS  A 10 point ROS was performed and negative unless otherwise noted in HPI.     Constitutional: Negative for fatigue, fever/chills.  Eyes: Negative for visual disturbance.  Ears, Nose, Throat: Negative for sore throat, nasal congestion.  Cardiovascular: Negative for chest pain, palpitations.  Respiratory: Negative for shortness of breath, cough.  Gastrointestinal: Negative for abdominal pain, nausea, vomiting, diarrhea, constipation, appetite disturbance.  Genitourinary: Positive for frequency.  Negative for dysuria, urgency,  "incontinence.  Musculoskeletal: Negative for pain.  Neurologic: Positive for right-sided weakness, incoordination, mild cognitive changes.  Negative for headache, dizziness or lightheadedness, numbness or tingling, speech or swallow changes.  Psychiatric: Negative for sleep or mood disturbance.      PHYSICAL EXAM  VITAL SIGNS:  BP (!) 148/74 (BP Location: Right arm, Patient Position: Chair)   Pulse 84   Temp 97.5  F (36.4  C) (Oral)   Resp 16   Ht 1.638 m (5' 4.5\")   Wt 63.5 kg (140 lb)   SpO2 96%   BMI 23.66 kg/m    BMI:  Estimated body mass index is 23.66 kg/m  as calculated from the following:    Height as of this encounter: 1.638 m (5' 4.5\").    Weight as of this encounter: 63.5 kg (140 lb).     General: NAD, sitting up in chair  HEENT: NC/AT, MMM  Pulmonary: non-labored on room air, lungs CTA bilaterally  Cardiovascular: RRR, no murmurs, cardiac monitor on L chest  Abdominal: soft, non-tender, non-distended, bowel sounds present  Extremities: warm, well perfused, no edema in bilateral lower extremities, no tenderness in calves  MSK/neuro:   Mental Status:  alert and oriented x3    Cranial Nerves:   1. 2nd CN: Pupils equal, round, reactive to light and visual fields intact to confrontation.   2. 3rd,4th,6th CN:  EOMI  3. 5th CN: facial sensation intact   4. 7th CN: face symmetrical   5. 8th CN: functional hearing bilaterally  6. 9th, 10th CN: palate elevates symmetrically   7. 11th CN: sternocleidomastoids and trapezii strong   8. 12th CN: tongue midline and without fasciculations     Sensory: Normal to light touch in bilateral upper and lower extremities   Strength: 5/5 in all muscle groups of left upper and lower extremities   SF  EF  EE  WE  G HF  KE  DF  PF   R  3 4+ 4 4 4 4 5 5 5   Coordination: finger to nose on right slowed, intentional, ataxic.  Heel shin slide slowed and less accurate on right.   Speech: clear/fluent   Cognition: intact to conversation, follows commands, responds " appropriately   Gait: deferred  Skin: no gross abnormalities on visible skin      LABS  CBC RESULTS: Recent Labs   Lab Test 12/31/22  0710 12/30/22  1103   WBC 6.4 7.2   RBC 4.43 4.48   HGB 12.6 13.1   HCT 37.7 38.2   MCV 85 85   MCH 28.4 29.2   MCHC 33.4 34.3   RDW 14.2 14.2    292     Last Basic Metabolic Panel:  Recent Labs   Lab Test 01/02/23  1048 12/31/22  1643 12/31/22  1242 12/31/22  0710 12/31/22  0639 12/30/22  1103     --   --  137  --  135   POTASSIUM 4.4  --   --  4.1  --  4.2   CHLORIDE 105  --   --  106  --  104   CO2 25  --   --  23  --  24   ANIONGAP 5  --   --  8  --  7   GLC 95 129* 117* 113*   < > 118*   BUN 17  --   --  18  --  19   CR 0.74  --   --  0.80  --  0.77   GFRESTIMATED 79  --   --  72  --  76   JAC 9.1  --   --  9.4  --  9.8    < > = values in this interval not displayed.     Hemoglobin A1C   Date Value Ref Range Status   12/30/2022 6.0 (H) 0.0 - 5.6 % Final     Comment:     Normal <5.7%   Prediabetes 5.7-6.4%    Diabetes 6.5% or higher     Note: Adopted from ADA consensus guidelines.     Recent Labs   Lab Test 12/30/22  1103   CHOL 214*   HDL 93   LDL 96   TRIG 127     MR ANGIOGRAM OF THE HEAD WITHOUT CONTRAST December 30, 2022 1:23 PM  IMPRESSION:    1. No definite vascular cutoff of the proximal major intracranial  arteries.  2. Apparent moderate stenosis at the proximal left M2 branches.     MRI BRAIN WITHOUT AND WITH CONTRAST  12/30/2022 1:50 PM  IMPRESSION:    1. Subtle restricted diffusion in the left corona radiata which could  represent an acute to subacute infarct. No evidence of hemorrhagic  transformation of infarct. No mass effect or midline shift.  2. Moderate diffuse parenchymal volume loss and marked white matter  changes most likely due to chronic microvascular ischemic disease.    MRA NECK WITHOUT AND WITH CONTRAST  12/30/2022 1:53 PM  IMPRESSION:    1. Patent arteries in the neck without evidence of dissection.  2. Apparent atherosclerotic disease of  the right carotid bifurcation  and proximal internal carotid artery which appears to cause  approximately 50% stenosis by NASCET criteria.    EXAM: CTA HEAD NECK W CONTRAST, CT HEAD W/O CONTRAST  LOCATION: [facility]  DATE/TIME: 12/31/2022 9:01 AM  IMPRESSION:   HEAD CT:  1.  No acute hemorrhage.  2.  Small lacunar infarction left corona radiata seen on previous MRI is difficult to distinguish from background chronic microvascular ischemic change.  3.  Mild to moderate generalized volume loss.  HEAD CTA:   1.  Redemonstrated mild narrowing at the left M1/M2 junction.  2.  No significant large artery stenosis or occlusion.  NECK CTA:  1.  Moderate 55% stenosis at the proximal right internal carotid artery.  2.  No measurable stenosis within the left internal carotid artery.  3.  No vertebral artery stenosis.    EXAM: CT HEAD PERFUSION W CONTRAST  LOCATION: [facility]  DATE/TIME: 12/31/2022 9:06 AM  IMPRESSION:   1.  Normal cerebral perfusion.      IMPRESSION/PLAN:  Inga Coats is a 82 year old right hand dominant female with a past medical history of CAD s/p CABG x3, aortic stenosis s/p AVR, TIA, hyperlipidemia, and CKD 3 who was admitted on 12/30/22 with subacute left corona radiata stroke with hospital course complicated by elevated BP, new prediabetes diagnosis, and fluctuating stroke symptoms.  She is now admitted to ARU on 1/3/23 for multidisciplinary rehabilitation and ongoing medical management.      Admission to acute inpatient rehab 01/03/23.    Impairment group code: Stroke Ischemic 01.2 (R) Body Involvement (L) Brain; Acute vs subacute left corona radiata stroke      1. PT, OT and SLP 60 minutes of each on a daily basis, in addition to rehab nursing and close management of physiatrist.      2. Impairment of ADL's: Noted to have impaired activity tolerance, impaired balance, impaired cognition, impaired coordination, impaired judgement and safety  awareness, impaired strength, impaired tone, impaired weight shifting and impulsiveness, all affecting her ability to safely and independently perform basic ADLs.  Goal for mod I with basic ADLs.    3. Impairment of mobility:  Noted to have impaired activity tolerance, impaired balance, impaired cognition, impaired coordination, impaired judgement and safety awareness, impaired strength, impaired tone, impaired weight shifting and impulsiveness, all affecting her ability to safely and independently perform basic mobility.  Goal for mod I with basic mobility.    4. Impairment of cognition/language/swallow:  Noted to have some possible cognitive deficits.  Would benefit from full cognitive-linguistic evaluation with SLP at ARU with goal for improved cognitive-linguistic skills to meet basic needs.    5. Medical Conditions    Subacute left corona radiata stroke  Presented with with right hand incoordination, RUE weakness and possible shuffling gait on RLE which resolved at time of admission.    * MRI brain showed acute to subacute infarct in the left corona radiata.   * MRA neck - Patent arteries in the neck without evidence of dissection.  Apparent atherosclerotic disease of right carotid bifurcation and proximal internal carotid artery which appears to cause approximately 50% stenosis by NASCET criteria  * Loaded with plavix in ED, then started on ASA, Plavix.    * 12/31 AM: Recurrent symptoms of right upper ext ataxia, drift, weakness. Neurology contacted. Repeat CT/CTA head and neck CTA without changes (see report for detail).  * Per neuro, stroke likely due to intracranial atherosclerosis  * TTE-LVEF 65-70%, no thrombus seen in LV  * 1/1 - reporting more weakness in RUE. Neuro evaluated on 1/1 due to flunctuating symptoms, recommended to avoid hypotension, MRI brain w/o if sxs persist.  RUE weakness has been stable since per inpatient team.  - Continue aspirin 81 mg daily  - Continue Plavix 75 mg daily x90 days  (started on 12/31)  - PTA Lipitor increased to 80 mg daily, LDL 96  - Outpatient BP <130/80.  Avoid hypotenstion.  Management as below.  - 30-day cardiac monitor at discharge per neurology.  Monitor in place at ARU admission.  - Stroke PLC completed while inpatient on 1/3  - Continue PT/OT/SLP  - Follow-up with [facility]     Hypertension  BP elevated, started amlodipine 5mg daily on 1/3.    - Per neurology, avoid rapid decrease in BP and needs gradual titration of medication for optimal BP control with long-term (OP) goal <130/80.  - Continue amlodipine 5 mg daily  - Monitor BP     CAD status post three-vessel CABG  Aortic stenosis s/p aortic valve replacement  - Antiplatelet and statin therapy as above     Hyperlipidemia  PTA statin increased as above due to LDL 96.  - Continue atorvastatin 80 mg daily  - Goal LDL <70     CKD 3  Baseline Cr ~1.  Stable this admission 0.7-0.8.  - Avoid nephrotoxins as able  - Monitor BMP every M/Th     Hyperglycemia w/ prediabetes  Newly identified this admission, A1c 6%  - Goal A1c <7 for secondary stroke prevention  - Follow up with PCP for ongoing management/surveillance    6. Adjustment to disability:  Clinical psychology to eval and treat if indicated  7. FEN: regular diet, thin liquids  8. Bowel: continent, monitor, PRN bowel meds available  9. Bladder: continent, monitor PVRs at admission  10. DVT Prophylaxis: mechanical  11. GI Prophylaxis: not indicated  12. Code: full, confirmed at admission  13. Disposition: goal for home  14. ELOS:  8 days  15. Rehab prognosis:  good  16. Follow up Appointments on Discharge: PCP in 1-2 weeks, stroke neurology ([facility]) in 6-8 weeks          Joann Nowak PA-C  Physical Medicine & Rehabilitation

## 2023-01-03 NOTE — PROGRESS NOTES
Care Management Discharge Note    Discharge Date: 01/03/2023       Discharge Disposition: Acute Rehab today via [facility] w/c at 13:30 pending insurance authorizationc    Discharge Services:      Discharge DME:      Discharge Transportation: agency    Private pay costs discussed:     PAS Confirmation Code:    Patient/family educated on Medicare website which has current facility and service quality ratings:      Education Provided on the Discharge Plan:    Persons Notified of Discharge Plans:     Patient/Family in Agreement with the Plan: yes    Handoff Referral Completed:     Additional Information:  SW received call from  ARU liaison indicating that they have received insurance authorization for pt.  Hospitalist paged for orders and discharge summary.    ABIODUN Alarcon  [facility]  Care Transitions  [phone]

## 2023-01-03 NOTE — DISCHARGE SUMMARY
[facility]  Hospitalist Discharge Summary      Date of Admission:  12/30/2022  Date of Discharge:  1/3/2023  Discharging Provider: Samantha Walker MD  Discharge Service: Hospitalist Service    Discharge Diagnoses   See below    Follow-ups Needed After Discharge   Follow-up Appointments     Follow Up and recommended labs and tests      Follow up with rehab physician.             Unresulted Labs Ordered in the Past 30 Days of this Admission     No orders found from 11/30/2022 to 12/31/2022.          Discharge Disposition   Discharged to ARU  Condition at discharge: Stable  Patient ready to discharge to a skilled nursing facility as soon as possible in order to create capacity for patients related to the COVID-19 pandemic.    Hospital Course   Inga Coats is a 82 year old female w/ PMH of of CAD status post three-vessel CABG, aortic stenosis requiring aortic valve replacement, hyperlipidemia, TIA, CKD 3 admitted on 12/30/2022 for TIA and subacute stroke.      Acute vs subacute left corona radiata stroke  Presents with with right hand discoordination, right upper extremity weakness and possible shuffling gait on the right lower extremity which resolved at time of admission.    *MRI brain showed subtle restricted diffusion in the left corona radiata which could represent an acute to subacute infarct.   * MRA neck- Patent arteries in the neck without evidence of dissection. 2. Apparent atherosclerotic disease of the right carotid bifurcation and proximal internal carotid artery which appears to cause approximately 50% stenosis by NASCET criteria  * loaded with plavix in ED, then started on ASA, Plavix.    * Recurrent symptoms of right upper ext ataxia,drift, weakness am of 12/31. Neurology contacted. Repeat CT/CTA head and neck CTA without changes (see report for detail)  *Per neuro, stroke likely due to intracranial atherosclerosis  * TTE-LVEF 65-70%, no thrombus seen in LV  *  1/1-reporting more weakness in right upper ext. Neuro evaluated on 1/1 due to flunctuating symptoms, If sxs persist then can get repeat MRI brain WO, SBP goal <220 and avoid hypotension  1/2-1/3-- right upper ext weakness has been stable    -Continue aspirin 81 mg daily  -Continue Plavix 75 mg daily x90 days (started on 12/31)  -PTA Lipitor increased to 80 mg daily, LDL 96  -avoid hypotenstion. See below regarding bp management  -30-day cardiac monitor at discharge per neurology  -Follow-up on [facility]  -PT/OT evaluation, rec ARU    HTN:  -  BP elevated, started amlodipine 5mg daily on 1/3--> needs to avoid rapid decrease in bp and needs gradual titration of medication for optimal bp control. Long term OP goal is <130/80      CAD status post three-vessel CABG  aortic stenosis s/p aortic valve replacement  -antiplatelet and statin therapy as above     Hyperlipidemia  -statin increased as above.  LDL 96    CKD 3  -baseline Cr 1.05     Hyperglycemia w/ prediabetes, newly identified this admission, A1c 6%  -OP follow up           Consultations This Hospital Stay   PATIENT LEARNING CENTER IP CONSULT  NEUROLOGY IP STROKE CONSULT  SPEECH LANGUAGE PATH ADULT IP CONSULT  PHARMACY IP CONSULT  PHARMACY IP CONSULT  PHARMACY IP CONSULT  PHYSICAL THERAPY ADULT IP CONSULT  OCCUPATIONAL THERAPY ADULT IP CONSULT  REHAB ADMISSIONS LIAISON IP CONSULT  CARE MANAGEMENT / SOCIAL WORK IP CONSULT  PHYSICAL THERAPY ADULT IP CONSULT  OCCUPATIONAL THERAPY ADULT IP CONSULT    Code Status   Full Code    Time Spent on this Encounter   ISamantha MD, personally saw the patient today and spent 35 minutes discharging this patient.       Samantha Walker MD  [facility] NEUROSCIENCE UNIT  [facility] [place] [address]  Phone: [phone]  ______________________________________________________________________    Physical Exam   Vital Signs: Temp: 98.6  F (37  C) Temp src: Oral BP:  (!) 153/75 Pulse: 82   Resp: 17 SpO2: 96 % O2 Device: None (Room air)    Weight: 135 lbs 0 oz  See progress note       Primary Care Physician   Joe Ramires    Discharge Orders      General info for SNF    Length of Stay Estimate: Short Term Care: Estimated # of Days <30  Condition at Discharge: Improving  Level of care:skilled   Rehabilitation Potential: Excellent  Admission H&P remains valid and up-to-date: Yes  Recent Chemotherapy: N/A  Use Nursing Home Standing Orders: Yes     Mantoux instructions    Give two-step Mantoux (PPD) Per Facility Policy Yes     Follow Up and recommended labs and tests    Follow up with rehab physician.     Reason for your hospital stay    Admitted to the hospital for stroke.     Activity - Up with assistive device     Activity - Up with nursing assistance     Full Code     Physical Therapy Adult Consult    Evaluate and treat as clinically indicated.    Reason:  CVA     Occupational Therapy Adult Consult    Evaluate and treat as clinically indicated.    Reason:  CVA     Diet    Follow this diet upon discharge: regular diet     Stroke Hospital Follow Up    [employer] will call you to coordinate care as prescribed by your provider. If you don't hear from a representative within 2 business days, please call [phone].    General Neurology [facility] in 6-8 wks  [facility] will call you to coordinate care as prescribed by your provider. If you don t hear from a representative within 2 business days, please call [phone].         Significant Results and Procedures   Most Recent 3 CBC's:Recent Labs   Lab Test 12/31/22  0710 12/30/22  1103   WBC 6.4 7.2   HGB 12.6 13.1   MCV 85 85    292     Most Recent 3 BMP's:Recent Labs   Lab Test 01/02/23  1048 12/31/22  1643 12/31/22  1242 12/31/22  0710 12/31/22  0639 12/30/22  1103     --   --  137  --  135   POTASSIUM 4.4  --   --  4.1  --  4.2   CHLORIDE 105  --   --  106  --  104   CO2 25  --   --  23  --   24   BUN 17  --   --  18  --  19   CR 0.74  --   --  0.80  --  0.77   ANIONGAP 5  --   --  8  --  7   JAC 9.1  --   --  9.4  --  9.8   GLC 95 129* 117* 113*   < > 118*    < > = values in this interval not displayed.     Most Recent Cholesterol Panel:Recent Labs   Lab Test 12/30/22  1103   CHOL 214*   LDL 96   HDL 93   TRIG 127   ,   Results for orders placed or performed during the hospital encounter of 12/30/22   MR Brain w/o & w Contrast    Narrative    MRI BRAIN WITHOUT AND WITH CONTRAST  12/30/2022 1:50 PM     HISTORY: TIA, right arm and leg weakness.    TECHNIQUE:  Multiplanar, multisequence MRI of the brain without and  with 10 mL Gadavist.     COMPARISON: None.     FINDINGS: Subtle area of restricted diffusion in the left corona  radiata (series 5 image 65) without definitive low signal on ADC. Mild  T2 hyperintensity in this area. No corresponding susceptibility  hypointensity to suggest hemorrhage in this area. No mass effect or  midline shift. No other areas of restricted diffusion. Moderate  diffuse parenchymal volume loss. Marked periventricular white matter  T2 hyperintensities which are nonspecific, but most likely related to  chronic microvascular ischemic disease.    No abnormal intracranial enhancement.     The facial structures appear normal.       Impression    IMPRESSION:    1. Subtle restricted diffusion in the left corona radiata which could  represent an acute to subacute infarct. No evidence of hemorrhagic  transformation of infarct. No mass effect or midline shift.  2. Moderate diffuse parenchymal volume loss and marked white matter  changes most likely due to chronic microvascular ischemic disease.       ZULEYMA QUARLES MD         SYSTEM ID:  [id number]   MR Head w/o Contrast Angiogram    Narrative    MR ANGIOGRAM OF THE HEAD WITHOUT CONTRAST December 30, 2022 1:23 PM     HISTORY: Stroke, transient ischemic attack.    TECHNIQUE: 3D time-of-flight MR angiogram of the head  without  contrast.    COMPARISON: None.    FINDINGS: The major intracranial arteries including the proximal  branches of the anterior cerebral, middle cerebral, and posterior  cerebral arteries appear patent without vascular cutoff. No aneurysm  identified. Apparent moderate stenosis at the proximal left M2  branches.       Impression    IMPRESSION:    1. No definite vascular cutoff of the proximal major intracranial  arteries.  2. Apparent moderate stenosis at the proximal left M2 branches.       ZULEYMA QUARLES MD         SYSTEM ID:  [id number]   MRA Angiogram Neck w/o & w Contrast    Narrative    MRA NECK WITHOUT AND WITH CONTRAST  12/30/2022 1:53 PM     HISTORY: Stroke, TIA. Right hand and leg weakness.    TECHNIQUE: 2D time-of-flight MR angiogram of the neck without contrast  and 3D MR angiogram of the neck with 10 mL Gadavist. Estimates of  carotid stenoses are made relative to the distal internal carotid  artery diameters except as noted.     COMPARISON: None.     FINDINGS:    Normal origin of the great vessels from the aortic arch.     Right carotid artery: The right common and internal carotid arteries  are patent. Mild luminal irregularity at the right carotid bifurcation  likely due to atherosclerotic disease which appears to cause  approximately 50% stenosis by NASCET criteria.     Left carotid artery: The left common and internal carotid arteries are  patent. No significant stenosis.     Vertebral arteries: Vertebral arteries appear patent without evidence  of dissection. No significant stenosis.       Impression    IMPRESSION:    1. Patent arteries in the neck without evidence of dissection.  2. Apparent atherosclerotic disease of the right carotid bifurcation  and proximal internal carotid artery which appears to cause  approximately 50% stenosis by NASCET criteria.    ZULEYMA QUARLES MD         SYSTEM ID:  [id number]   CT Head w/o Contrast    Narrative    EXAM: CTA HEAD NECK W CONTRAST, CT HEAD W/O  CONTRAST  LOCATION: [facility]  DATE/TIME: 12/31/2022 9:01 AM    INDICATION: New RUE drift.  COMPARISON: Brain MRI and MRA head and neck 12/30/2022.  CONTRAST: 75 mL Isovue 370  TECHNIQUE: Head and neck CT angiogram with IV contrast. Noncontrast head CT followed by axial helical CT images of the head and neck vessels obtained during the arterial phase of intravenous contrast administration. Axial 2D reconstructed images and   multiplanar 3D MIP reconstructed images of the head and neck vessels were performed by the technologist. Dose reduction techniques were used. All stenosis measurements made according to NASCET criteria unless otherwise specified.    FINDINGS:   NONCONTRAST HEAD CT:   INTRACRANIAL CONTENTS: No intracranial hemorrhage, extraaxial collection, or mass effect.  The focal area of diffusion restriction seen on previous MRI is not definitively distinguishable from background moderate chronic microvascular ischemic change.   Mild to moderate generalized volume loss. No hydrocephalus.     VISUALIZED ORBITS/SINUSES/MASTOIDS: No intraorbital abnormality. No paranasal sinus mucosal disease. No middle ear or mastoid effusion.    BONES/SOFT TISSUES: No acute abnormality.    HEAD CTA:  ANTERIOR CIRCULATION: Mild stenosis at the left M1/M2 junction, less pronounced than on previous MRA. No significant large artery stenosis or occlusion. No aneurysm or vascular malformation. Standard [place] anatomy.    POSTERIOR CIRCULATION: No stenosis/occlusion, aneurysm, or high flow vascular malformation. Balanced vertebral arteries supply a normal basilar artery.     DURAL VENOUS SINUSES: Expected early venous phase enhancement of the major dural venous sinuses.    NECK CTA:  RIGHT CAROTID: Moderate plaque at the carotid bifurcation with focal 55% stenosis within the proximal internal carotid artery.    LEFT CAROTID: Mild plaque at the carotid bifurcation without measurable stenosis of  the internal carotid artery.    VERTEBRAL ARTERIES: No focal stenosis or dissection. Balanced vertebral arteries.    AORTIC ARCH: Scattered calcified plaque within the aortic arch. No significant stenosis of the great vessels.    NONVASCULAR STRUCTURES: Unremarkable.      Impression    IMPRESSION:   HEAD CT:  1.  No acute hemorrhage.  2.  Small lacunar infarction left corona radiata seen on previous MRI is difficult to distinguish from background chronic microvascular ischemic change.  3.  Mild to moderate generalized volume loss.    HEAD CTA:   1.  Redemonstrated mild narrowing at the left M1/M2 junction.  2.  No significant large artery stenosis or occlusion.    NECK CTA:  1.  Moderate 55% stenosis at the proximal right internal carotid artery.  2.  No measurable stenosis within the left internal carotid artery.  3.  No vertebral artery stenosis.   CT Head Perfusion w Contrast    Narrative    EXAM: CT HEAD PERFUSION W CONTRAST  LOCATION: [facility]  DATE/TIME: 12/31/2022 9:06 AM    INDICATION: New RUE drift.  COMPARISON: None.  TECHNIQUE: CT cerebral perfusion was performed utilizing a second contrast bolus. Perfusion data were post processed with generation of standard perfusion maps and estimation of ischemic/infarcted volumes utilizing standard threshold values. Dose   reduction techniques were used. All stenosis measurements made according to NASCET criteria unless otherwise specified.  CONTRAST: 50 mL Isovue 370    FINDINGS:   PERFUSION MAPS: Symmetrical cerebral perfusion. No focal deficits in cerebral blood flow or volume to suggest ischemia/oligemia.    RAPID ANALYSIS:  CBF<30%: 0 mL.  Tmax>6sec: 0 mL.  Mismatch volume: 0 mL.  Mismatch ratio: None.      Impression    IMPRESSION:   1.  Normal cerebral perfusion.   CTA Head Neck with Contrast    Narrative    EXAM: CTA HEAD NECK W CONTRAST, CT HEAD W/O CONTRAST  LOCATION: [facility]  DATE/TIME: 12/31/2022  9:01 AM    INDICATION: New RUE drift.  COMPARISON: Brain MRI and MRA head and neck 12/30/2022.  CONTRAST: 75 mL Isovue 370  TECHNIQUE: Head and neck CT angiogram with IV contrast. Noncontrast head CT followed by axial helical CT images of the head and neck vessels obtained during the arterial phase of intravenous contrast administration. Axial 2D reconstructed images and   multiplanar 3D MIP reconstructed images of the head and neck vessels were performed by the technologist. Dose reduction techniques were used. All stenosis measurements made according to NASCET criteria unless otherwise specified.    FINDINGS:   NONCONTRAST HEAD CT:   INTRACRANIAL CONTENTS: No intracranial hemorrhage, extraaxial collection, or mass effect.  The focal area of diffusion restriction seen on previous MRI is not definitively distinguishable from background moderate chronic microvascular ischemic change.   Mild to moderate generalized volume loss. No hydrocephalus.     VISUALIZED ORBITS/SINUSES/MASTOIDS: No intraorbital abnormality. No paranasal sinus mucosal disease. No middle ear or mastoid effusion.    BONES/SOFT TISSUES: No acute abnormality.    HEAD CTA:  ANTERIOR CIRCULATION: Mild stenosis at the left M1/M2 junction, less pronounced than on previous MRA. No significant large artery stenosis or occlusion. No aneurysm or vascular malformation. Standard [place] anatomy.    POSTERIOR CIRCULATION: No stenosis/occlusion, aneurysm, or high flow vascular malformation. Balanced vertebral arteries supply a normal basilar artery.     DURAL VENOUS SINUSES: Expected early venous phase enhancement of the major dural venous sinuses.    NECK CTA:  RIGHT CAROTID: Moderate plaque at the carotid bifurcation with focal 55% stenosis within the proximal internal carotid artery.    LEFT CAROTID: Mild plaque at the carotid bifurcation without measurable stenosis of the internal carotid artery.    VERTEBRAL ARTERIES: No focal stenosis or  dissection. Balanced vertebral arteries.    AORTIC ARCH: Scattered calcified plaque within the aortic arch. No significant stenosis of the great vessels.    NONVASCULAR STRUCTURES: Unremarkable.      Impression    IMPRESSION:   HEAD CT:  1.  No acute hemorrhage.  2.  Small lacunar infarction left corona radiata seen on previous MRI is difficult to distinguish from background chronic microvascular ischemic change.  3.  Mild to moderate generalized volume loss.    HEAD CTA:   1.  Redemonstrated mild narrowing at the left M1/M2 junction.  2.  No significant large artery stenosis or occlusion.    NECK CTA:  1.  Moderate 55% stenosis at the proximal right internal carotid artery.  2.  No measurable stenosis within the left internal carotid artery.  3.  No vertebral artery stenosis.   Echocardiogram Complete - For age > 60 yrs     Value    LVEF  65-70%    Narrative    [id number]  [id number]  [id number]  [id number]^NOHEMI^JULIANE^HEATHER     [facility]  Echocardiography Laboratory  [address] [place] [address]     Name: AMARIS BAKER  MRN: [id number]  : 1940  Study Date: 2022 09:35 AM  Age: 82 yrs  Gender: Female  Patient Location: [facility]  Reason For Study: Cerebrovascular Incident  Ordering Physician: JULIANE SONG  Referring Physician: Joe Ramires  Performed By: Emma Jason     BSA: 1.7 m2  Height: 65 in  Weight: 135 lb  HR: 79  BP: 162/78 mmHg  ______________________________________________________________________________  Procedure  Complete Portable Echo Adult. Optison (NDC #4253-2117) given intravenously.  ______________________________________________________________________________  Interpretation Summary     The visual ejection fraction is 65-70%.  There is no thrombus seen in the left ventricle.  RV not well seen, grossly normal size. Function is either normal or minimally  reduced  The aortic valve is not well visualized.  By report there is a prosthetic  aortic valve. It is not well seen, it is  likely a tissue valve. Mean gradient likely normal for this unknown prosthetic  valve  No comparison echoes available  Limited views were obtained.  The study was technically difficult.  The study was technically difficult. Limited views were obtained.  ______________________________________________________________________________  Left Ventricle  The left ventricle is normal in size. Proximal septal thickening is noted.  Echo findings are not consistent with left ventricular outflow obstruction.  The visual ejection fraction is 65-70%. Left ventricular diastolic function is  indeterminate. Normal left ventricular wall motion. There is no thrombus seen  in the left ventricle.     Right Ventricle  RV not well seen, grossly normal size. Function is either normal or minimally  reduced. The right ventricle is not well visualized.     Atria  Normal left atrial size. Right atrial size is normal.     Mitral Valve  There is trace mitral regurgitation.     Tricuspid Valve  Normal tricuspid valve. Right ventricular systolic pressure could not be  approximated due to inadequate tricuspid regurgitation.     Aortic Valve  The aortic valve is not well visualized. By report there is a prosthetic  aortic valve. It is not well seen, it is likely a tissue valve. Mean gradient  likely normal for this unknown prosthetic valve.     Pulmonic Valve  The pulmonic valve is not well seen, but is grossly normal.     Vessels  The aortic root is normal size. IVC diameter <2.1 cm collapsing >50% with  sniff suggests a normal RA pressure of 3 mmHg.     Pericardium  The pericardium appears normal.     Rhythm  Sinus rhythm was noted.  ______________________________________________________________________________  MMode/2D Measurements & Calculations  IVSd: 0.87 cm     LVIDd: 3.6 cm  LVIDs: 2.3 cm  LVPWd: 0.78 cm  FS: 36.6 %  LV mass(C)d: 81.7 grams  LV mass(C)dI: 48.8 grams/m2  LA dimension: 3.9 cm  asc  Aorta Diam: 2.9 cm  LVOT diam: 2.0 cm  LVOT area: 3.3 cm2  LA Volume (BP): 37.4 ml  LA Volume Index (BP): 22.4 ml/m2  RWT: 0.43     Doppler Measurements & Calculations  MV E max renny: 58.3 cm/sec  MV A max renny: 75.4 cm/sec  MV E/A: 0.77  MV dec time: 0.21 sec  Ao V2 max: 201.5 cm/sec  Ao max P.0 mmHg  Ao V2 mean: 139.5 cm/sec  Ao mean P.0 mmHg  Ao V2 VTI: 36.6 cm  IMANI(I,D): 1.5 cm2  IMANI(V,D): 1.4 cm2  LV V1 max P.9 mmHg  LV V1 max: 84.7 cm/sec  LV V1 VTI: 16.7 cm  SV(LVOT): 54.6 ml  SI(LVOT): 32.6 ml/m2  PA acc time: 0.17 sec  AV Renny Ratio (DI): 0.42  IMANI Index (cm2/m2): 0.89  E/E' av.6  Lateral E/e': 6.8  Medial E/e': 8.4     ______________________________________________________________________________  Report approved by: Kurt Crespo 2022 10:48 AM               Discharge Medications   Current Discharge Medication List      START taking these medications    Details   amLODIPine (NORVASC) 5 MG tablet Take 1 tablet (5 mg) by mouth daily    Associated Diagnoses: Benign essential hypertension      clopidogrel (PLAVIX) 75 MG tablet Take 1 tablet (75 mg) by mouth daily for 86 days  Qty: 86 tablet, Refills: 0    Associated Diagnoses: Cerebrovascular accident (CVA), unspecified mechanism (H)         CONTINUE these medications which have CHANGED    Details   atorvastatin (LIPITOR) 80 MG tablet 1 tablet (80 mg) by Oral or NG Tube route every evening    Associated Diagnoses: Hyperlipidemia LDL goal <100         CONTINUE these medications which have NOT CHANGED    Details   Apoaequorin (PREVAGEN) 10 MG CAPS Take 1 capsule by mouth daily      aspirin (ASA) 81 MG chewable tablet Take 81 mg by mouth daily      Misc Natural Products (OSTEO BI-FLEX ADV TRIPLE ST PO) Take 2 capsules by mouth daily      multivitamin  with lutein (OCUVITE WITH LUTEIN) CAPS per capsule Take 1 capsule by mouth daily      multivitamin (CENTRUM SILVER) tablet Take 1 tablet by mouth daily      vitamin D3 (CHOLECALCIFEROL) 50  mcg (2000 units) tablet Take 1 tablet by mouth daily           Allergies   Allergies   Allergen Reactions     Shellfish-Derived Products Anaphylaxis

## 2023-01-03 NOTE — CONSULTS
01/03/23 1107 Karen Myles, [place]     Stroke Education Note     The following information has been reviewed with the patient:     1. Warning signs of stroke     2. Calling 911 if having warning signs of stroke     3. All modifiable risk factors: hypertension, CAD, atrial fib, diabetes, hypercholesterolemia, smoking, substance abuse, diet, physical inactivity, obesity, sleep apnea.     4. Patient's risk factors for stroke which include: CAD, HTN, HLD, previous stroke, diet,      5. Follow-up plan for after discharge     6. Discharge medications which include: HTN, HLD, ASA, Plavix     In addition, the above information was given to the patient in writing as a part of the [facility] Stroke Class Handout.     Learner's response to risk factors / lifestyle modification education: Desire to change Ability to change Reasons to change Need to change Committment to change      Karen Myles RN,

## 2023-01-03 NOTE — PROGRESS NOTES
"Physical Therapy Discharge Summary    Reason for therapy discharge:    Discharged to acute rehabilitation facility.    Progress towards therapy goal(s). See goals on Care Plan in [facility] electronic health record for goal details.  Goals partially met.  Barriers to achieving goals:   discharge from facility.    Therapy recommendation(s):    Continued therapy is recommended.  Rationale/Recommendations:  ARU per chart \"Patient completing bout of ambulation for 100' with FWW and CGA-min assist x1. Improved swing phase when utilizing FWW. Second and third bouts of ambulation with no assistive device and min assist x1 for 100' each. Demonstrating decreased step length and swing phase on R. Trendelenburg gait pattern noted. Cues for increased step length/swing phase on R. Fatiguing quickly and instability noted due to RLE trailing behind while ambulating. Needing assist to maintain upright and cues to reduce gait speed and focus on improved RLE swing.\".      "

## 2023-01-04 ENCOUNTER — APPOINTMENT (OUTPATIENT)
Dept: OCCUPATIONAL THERAPY | Facility: CLINIC | Age: 83
DRG: 057 | End: 2023-01-04
Attending: PHYSICAL MEDICINE & REHABILITATION
Payer: COMMERCIAL

## 2023-01-04 ENCOUNTER — APPOINTMENT (OUTPATIENT)
Dept: SPEECH THERAPY | Facility: CLINIC | Age: 83
DRG: 057 | End: 2023-01-04
Attending: PHYSICAL MEDICINE & REHABILITATION
Payer: COMMERCIAL

## 2023-01-04 ENCOUNTER — APPOINTMENT (OUTPATIENT)
Dept: PHYSICAL THERAPY | Facility: CLINIC | Age: 83
DRG: 057 | End: 2023-01-04
Attending: PHYSICAL MEDICINE & REHABILITATION
Payer: COMMERCIAL

## 2023-01-04 PROCEDURE — 250N000013 HC RX MED GY IP 250 OP 250 PS 637: Performed by: PHYSICIAN ASSISTANT

## 2023-01-04 PROCEDURE — 97750 PHYSICAL PERFORMANCE TEST: CPT | Mod: GP

## 2023-01-04 PROCEDURE — 97112 NEUROMUSCULAR REEDUCATION: CPT | Mod: GO

## 2023-01-04 PROCEDURE — 97110 THERAPEUTIC EXERCISES: CPT | Mod: GP

## 2023-01-04 PROCEDURE — 92523 SPEECH SOUND LANG COMPREHEN: CPT | Mod: GN | Performed by: SPEECH-LANGUAGE PATHOLOGIST

## 2023-01-04 PROCEDURE — 99232 SBSQ HOSP IP/OBS MODERATE 35: CPT | Mod: FS | Performed by: PHYSICIAN ASSISTANT

## 2023-01-04 PROCEDURE — 97530 THERAPEUTIC ACTIVITIES: CPT | Mod: GP

## 2023-01-04 PROCEDURE — 97535 SELF CARE MNGMENT TRAINING: CPT | Mod: GO

## 2023-01-04 PROCEDURE — 128N000003 HC R&B REHAB

## 2023-01-04 PROCEDURE — 97166 OT EVAL MOD COMPLEX 45 MIN: CPT | Mod: GO

## 2023-01-04 RX ADMIN — CLOPIDOGREL BISULFATE 75 MG: 75 TABLET, FILM COATED ORAL at 08:18

## 2023-01-04 RX ADMIN — Medication 1 CAPSULE: at 08:34

## 2023-01-04 RX ADMIN — AMLODIPINE BESYLATE 5 MG: 5 TABLET ORAL at 08:18

## 2023-01-04 RX ADMIN — ATORVASTATIN CALCIUM 80 MG: 80 TABLET, FILM COATED ORAL at 20:30

## 2023-01-04 RX ADMIN — ASPIRIN 81 MG CHEWABLE TABLET 81 MG: 81 TABLET CHEWABLE at 08:18

## 2023-01-04 RX ADMIN — Medication 1 TABLET: at 08:18

## 2023-01-04 RX ADMIN — Medication 50 MCG: at 08:18

## 2023-01-04 ASSESSMENT — ACTIVITIES OF DAILY LIVING (ADL)
ADLS_ACUITY_SCORE: 32
IADLS,_PREVIOUS_FUNCTIONAL_LEVEL: INDEPENDENT
ADLS_ACUITY_SCORE: 33
PREVIOUS_RESPONSIBILITIES: MEAL PREP;HOUSEKEEPING;LAUNDRY;SHOPPING;MEDICATION MANAGEMENT;FINANCES;DRIVING
ADLS_ACUITY_SCORE: 33
BADLS,_PREVIOUS_FUNCTIONAL_LEVEL: INDEPENDENT
ADLS_ACUITY_SCORE: 33

## 2023-01-04 NOTE — PROGRESS NOTES
01/04/23 0800   Signing Clinician's Name / Credentials   Signing clinician's name / credentials Charmaine Gabriel DPREYNALDO   Prieto Balance Scale (RAMIRO LLAMAS, JUDE S, MATTIE VIEIRA, EDWARD LONDON: MEASURING BALANCE IN THE ELDERLY: VALIDATION OF AN INSTRUMENT. CAN. J. PUB. HEALTH, JULY/AUGUST SUPPLEMENT 2:S7-11, 1992.)   Sit To Stand 4   Standing Unsupported 4   Sitting Unsupported 4   Stand to Sit 4   Transfers 3   Standing with Eyes Closed 3   Standing Unsupported, Feet Together 3   Reach Forward With Outstretched Arm 2   Retrieve Object From Floor 3   Turning to Look Behind 2   Turn 360 Degrees 0   Placing Alternate Foot on Stool (4-6 inches) 0   Unsupported Tandem Stand (Demonstrate to Subject) 0   One Leg Stand 0   Total Score (A score of 45 or less has been correlated with an increased risk of falls)   Total Score (out of 56) 32     Prieto Balance Scale (BBS) Cutoff Scores for CVA Population:    The BBS is a measure of static and dynamic standing balance that has been validated in community dwelling elderly individuals and individuals who have Parkinson's Disease, MS, and those who are s/p CVA and TBI. The test is administered without an assistive device. Scores from the Prieto are used to determine the probability of falling based on the patient's previous history of falls and their test performance.     0-20 High risk for falling- Corresponded with w/c bound status  21-40 Medium risk for falling- Able to walk with assistance  41-56 Low risk for falling- Able to walk independently  According to [facility].  Available at http://www.strokecenter.org/.  Accessibility verified April 10, 2013.  Minimal Detectable Change = 6.5 according to Jaswinder & Seney 2008    Assessment (rationale for performing, application to patient s function & care plan): Pt demonstrates R hemibody neglect during standing tasks. At times as unanticipated R knee buckle. Needs to continue to use WW for stability and safety with gait.

## 2023-01-04 NOTE — PLAN OF CARE
Discharge Planner Post-Acute Rehab SLP:     Discharge Plan: home with ongoing SLP    Precautions: None from SLP perspective    Current Status:  Hearing: WNL  Vision: Reading glasses  Communication: WNL  Cognition: Mild cognitive impairment; mild reasoning and min-mild memory  Swallow: Regular/thin (0); not evaluated on ARU, no concerns    Assessment: CLQT administered and interpreted. Pt overall composite score WNL; however, pt reported awreness of memory differences from PLOF and subjectively during test tasks pt demo'd increased difficulty with higher level reasoing/problem solving tasks, error correction. Pt score for executive function domain borderline WNL/Mild. Skilled SLP services indicated to train in cognitive strategies to promote functioning, independence upon return to home/community.    Other Barriers to Discharge (Family Training, etc): family training in strategies/support to increase independence.

## 2023-01-04 NOTE — PLAN OF CARE
Discharge Planner Post-Acute Rehab OT:     Discharge Plan:to apt with family support initally discharge 1-12    Precautions: falls, alarms    Current Status:  ADLs:    Mobility: bed mobility mod I, min a transfers    Grooming: tba    Dressing: U/B tba, L/B pants sba    Feet sba sitting reaching to floor pt able to tie b shoes with extra time and mod effort    Bathing: tba    Toileting:tba  IADLs: tba  Vision/Cognition: orientated to person place time will assess higher functional cog further    Assessment: mod I bed mobility pt sba with dressing with use of r hand as active assist     Other Barriers to Discharge (DME, Family Training, etc): closer to d/c

## 2023-01-04 NOTE — PLAN OF CARE
Occupational Therapy Discharge Summary    Reason for therapy discharge:    Discharged to acute rehabilitation facility.    Progress towards therapy goal(s). See goals on Care Plan in [facility] electronic health record for goal details.  Goals partially met.  Barriers to achieving goals:   discharge from facility.    Therapy recommendation(s):    Continued therapy is recommended.  Rationale/Recommendations:  to increase indep with funcitonal mobility and self cares.

## 2023-01-04 NOTE — CONSULTS
Social Work: Initial Assessment with Discharge Plan    Patient Name: Inga Coats  : 1940  Age: 82 year old  MRN: [id number]  Completed assessment with: Chart review and interview with patient   Admitted to ARU: 2023    Presenting Information   Date of SW assessment: 2023  Health Care Directive: Health Care Directive Agent (if patient not able to make decisions)  Primary Health Care Agent: Patient/self   Secondary Health Care Agent: Adult children NOK   Living Situation: lives alone in apartment (independent living facility) with elevator access (though typically uses stairs by choice). BR SET UP: Walk in shower, grab bars; stadard toilet. 2 cats in the home.   Previous Functional Status: Pt was independent with all ADLs/IADLs, transfers, mobility and gait. Not using AD at baseline.  Was driving PTA.   DME available: See therapy evaluation for more information   Patient and family understanding of hospitalization: Appropriate   Cultural/Language/Spiritual Considerations: 83 y/o woman, single, and Hinduism-ciara.     Physical Health  Reason for admission: Stroke Ischemic 01.2 (R) Body Involvement (L) Brain; Acute vs subacute left corona radiata stroke     Justification for Acute Inpatient Rehabilitation  Inga Coats is a 82 year old female w/ PMH of of CAD status post three-vessel CABG, aortic stenosis requiring aortic valve replacement, hyperlipidemia, TIA, CKD 3 admitted on 2022 for TIA and subacute stroke.    Provider Information   Primary Care Physician:Joe Ramires   [facility] will schedule PCP apt at discharge.   : None reported     Mental Health/Chemical Dependency:   Diagnosis: No concerns reported.   Alcohol/Tobacco/Narcotis: No concerns reported.   Support/Services in Place: None reported.   Services Needed/Recommended: [facility] Psychology support while on ARU available.   Sexuality/Intimacy: Not discussed     Support  "System  Marital Status: Single/.   Family support: patient has 4 adult children and 7 grandchildren (most of who are adults).  3 daughters live locally who are able to support her after ARU as needed, though patient would like to be as independent as possible.  Other support available: See above.     Community Resources  Current in home services: None reported.   Previous services: None reported.     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   Financial Concerns:  None reported.   Insurance: MEDICA/[employer]    Discharge Plan   Patient and family discharge goal: Home with OP   Provided Education on discharge plan: Unknown. Not discussed with pt by this writer at this time.   Patient agreeable to discharge plan:  Will discuss with pt tomorrow during team rounds, per PT.   Provided education and attained signature for Medicare IM and IRF Patient Rights and Privacy Information provided to patient : YES  Provided patient with [place] [facility] [place] Resources: YES  Barriers to discharge: None identified     Discharge Recommendations   Disposition: See above   Transportation Needs: Patient, family/friends, paid transport, insurance transport (if applicable)     Additional comments   Discharge TBD, ELOS 10 days.     PT eval completed. Per june, plan to discharge home Thurs 01/12/2023 with OP therapy. Will discuss further with pt and family. No immediate SW needs. SW available if needs arise.     SW will remain available and continue to follow as needs arise.       -------------------------------------------------------------------------------------------------------------  KODI Pain Assessment    Pain Effect on Sleep  Over the past 5 days, how much of the time has pain made it hard for you to sleep at night?\"    0. Does not apply - I " "have not had any pain or hurting in the past 5 days    Pain Interference with Therapy Activities  \"Over the past 5 days, how often have you limited your participation in rehabilitation therapy sessions due to pain?\"  0. Does not apply - I have not had any pain or hurting in the past 5 days    -------------------------------------------------------------------------------------------------------------    [facility]   [address] [place] [address], [address] [place] [address]  Phone: [phone], Fax: [phone], Pager: [phone]               "

## 2023-01-04 NOTE — PROGRESS NOTES
"   01/04/23 0031   Appointment Info   Signing Clinician's Name / Credentials (SLP) Joanne Duong MS, CCC-SLP   General Information   Onset of Illness/Injury or Date of Surgery 12/30/22   Referring Physician Joann Nowak PA-C   Patient/Family Therapy Goal Statement (SLP) To get better and go home.   Pertinent History of Current Problem Per H&P: Patient is \"82 year old right hand dominant female with past medical history of CAD s/p CABG x3, aortic stenosis s/p AVR, TIA, hyperlipidemia, and CKD 3 who presented on 12/30/22 with right-sided weakness lasting approximately 1 hour, with symptoms improving even in ED.  MRI brain revealed acute to subacute infarct in left corona radiata.  MRA head with moderate left MCA stenosis and ~50% right carotid stenosis.  Patient was hypertension in ED with BPs 160-180s/70s-80s.  Patient was admitted for further evaluation and management.     On 12/31, patient had recurrent right upper extremity drift and ataxia after having previously resolved.  Repeat CT/CTA head and neck CTA without changes.  Patient continued to have fluctuating symptoms, per neurology,  attributed to know left corona radiata stroke.  Recommended to continue plan of care and avoid hypotension.\" SLP consults received for cognitive-linguistic evaluation.   General Observations No acute SLP services during hospitalization. Pt pleasant and cooperative throughout evaluation, utilized right/impaired hand for tasks which impacted line drawing for some tasks, points not deducted if self corrected/related to decreased hand control.   Type of Evaluation   Type of Evaluation Speech, Language, Cognition   Motor Speech   Speech Intelligibility (Motor Speech) WNL   Auditory Comprehension   Follows Commands (Auditory Comprehension) WNL   Yes/No Questions (Auditory Comprehension) WNL   Verbal Expression   Conversational Speech (Verbal Expression) WNL   Cognition   Cognitive Function executive function deficit;memory deficit "   Cognitive Status Awake, appropriate, pleasant   Additional cognitive-linguistic evaluation indicated  Completed   Cognitive Status Exam Comments CLQT administered and interpreted, please see progress note/below for details.   Orientation Status (Cognition) oriented to;person;place;situation;time   Affect/Mental Status (Cognition) WNL   Follows Commands (Cognition) follows one-step commands;follows two-step commands;WFL;follows multi-step commands;over 90% accuracy   Executive Function Deficit (Cognition) organization/sequencing;planning/decision-making;problem-solving/reasoning   Memory Deficit (Cognition) short-term memory  (pt reported memory less good than prior to CVA.)   General Therapy Interventions   Planned Therapy Interventions Cognitive Treatment   Cognitive treatment External memory strategy training;Internal memory strategy training  (reasoning/problem solving)   Clinical Impression   Criteria for Skilled Therapeutic Interventions Met (SLP Eval) Yes, treatment indicated   SLP Diagnosis Mild cognitive impairment   Functional Limitations Related to Problem List (SLP) Decreased iADL accuracy, correction of errors.   Risks & Benefits of therapy have been explained evaluation/treatment results reviewed;care plan/treatment goals reviewed;participants voiced agreement with care plan;participants included;patient   Clinical Impression Comments SLP: CLQT administered and interpreted. Pt overall composite score WNL; however, pt reported awreness of memory differences from PLOF and subjectively during test tasks pt demo'd increased difficulty with higher level reasoing/problem solving tasks, error correction. Pt score for executive function domain borderline WNL/Mild. Skilled SLP services indicated to train in cognitive strategies to promote functioning, independence upon return to home/community.   SLP Total Evaluation Time   Eval: Sound production with lang comprehension and expression Minutes (23553) 60   SLP  Goals   Therapy Frequency (SLP Eval) daily   SLP Predicted Duration/Target Date for Goal Attainment 01/13/23   SLP Goals SLP Goal 1;SLP Goal 2   SLP: Goal 1 Patient will complete high complexity reasoning/problem solving tasks with 90% accuracy and minimal cues from SLP.   SLP: Goal 2 Patient will recall new, complex information with use of trained memory strategies and no cues from SLP.   SLP Discharge Planning   SLP Plan SLP: moderate to high complexity reasoning/problem solving, functional iADL tasks/strategies for completing   Total Session Time   Total Session Time (sum of timed and untimed services) 60   SLP - Acute Rehab Center Time   Individual Time (minutes) - enter zero if not applicable - SLP 60   Group Time (minutes) - enter zero if not applicable  - SLP 0   Concurrent Time (minutes) - enter zero if not applicable  - SLP 0   Co-Treatment Time (minutes) - enter zero if not applicable  - SLP 0   ARC Total Session Time (minutes) - SLP 60   Comprehension   Comprehension Comment SLP: WNL   Expression   Expression Comment SLP: WNL   Problem Solving   Problem Solving Comment SLP: Mild   Memory   Memory Comment SLP: Min-mild     SUMMARY OF TEST:    The CLQT assesses visual attention and perception, working memory and language output skills, as well as auditory memory and comprehension.  Non-linguistic tasks can help assess planning, and self-monitoring, visual discrimination and analysis, as well as creativity and mental flexibility.   Together, these subtests assess the cognitive domains of attention, memory, executive function, language, and visuospatial skills using a severity rating of either WNL (within normal limits), Mild, Moderate or Severe.        Cognitive Domain                   Severity Rating/Score  Attention                                   WNL/175  Memory                                    WNL/164  Executive Functions                 WNL/20 (borderline Mild)  Language                                  WNL/31  Visuospatial Skills                    WNL/73  Composite Severity Rating        WNL/4.0  Clock Drawing Severity Rating    WNL/11    INTERPRETATION OF TEST RESULTS: CLQT administered and interpreted. Pt overall composite score WNL; however, pt reported awreness of memory differences from PLOF and subjectively during test tasks pt demo'd increased difficulty with higher level reasoing/problem solving tasks, error correction. Pt score for executive function domain borderline WNL/Mild. Skilled SLP services indicated to train in cognitive strategies to promote functioning, independence upon return to home/community.    TIME FOR INTERPRETATION AND PREPARATION OF REPORT: 10  TOTAL TIME: 45    Reference:  Linda Bueno, Daily, [facility]-SLP, (2001) [employer]

## 2023-01-04 NOTE — PHARMACY-ADMISSION MEDICATION HISTORY
Please see Admission Medication History note completed by pharmacist on 12/30/22 under previous encounter at [facility] for information regarding prior to admission medications.

## 2023-01-04 NOTE — PROGRESS NOTES
01/04/23 1000   Appointment Info   Signing Clinician's Name / Credentials (OT) Malaika Leal OTR-L   Living Environment   People in Home alone   Current Living Arrangements apartment   Home Accessibility no concerns   Living Environment Comments Lives in accessible apartment no stairs. Was driving and retired teacher  and realiter   Self-Care   Usual Activity Tolerance good   Current Activity Tolerance moderate   Equipment Currently Used at Home none   Fall history within last six months no   Number of times patient has fallen within last six months 0   Activity/Exercise/Self-Care Comment Has exercise and activity rountine at home including treadmill walking. Lives in accessible apartment with 0 BETTIE. Drives and performs IADLs INDly. Has 3 children within 30-45 minutes of the patient.   Instrumental Activities of Daily Living (IADL)   Previous Responsibilities meal prep;housekeeping;laundry;shopping;medication management;finances;driving   Previous Level of Function/Home Environm   Bathing/Grooming, Premorbid Functional Level independent   Dressing, Premorbid Functional Level independent   Eating/Feeding, Premorbid Functional Level independent   Toileting, Premorbid Functional Level independent   BADLs, Premorbid Functional Level independent   IADLs, Premorbid Functional Level independent   Bed Mobility, Premorbid Functional Level independent   Transfers, Premorbid Functional Level independent   Household Ambulation, Premorbid Functional Level independent   Stairs, Premorbid Functional Level independent   Community Ambulation, Premorbid Functional Level independent   General Information   Onset of Illness/Injury or Date of Surgery 01/31/23   Referring Physician Danial Rene   Patient/Family Therapy Goal Statement (OT) t return t  apartment   Additional Occupational Profile Info/Pertinent History of Current Problem On 12/31, patient had recurrent right upper extremity drift and ataxia after having  previously resolved.  Repeat CT/CTA head and neck CTA without changes.  Patient continued to have fluctuating symptoms, per neurology,  attributed to know left corona radiata stroke.   Existing Precautions/Restrictions fall   Limitations/Impairments safety/cognitive   Left Upper Extremity (Weight-bearing Status) full weight-bearing (FWB)   Right Upper Extremity (Weight-bearing Status) full weight-bearing (FWB)   Left Lower Extremity (Weight-bearing Status) full weight-bearing (FWB)   Right Lower Extremity (Weight-bearing Status) full weight-bearing (FWB)   Heart Disease Risk Factors Medical history   Cognitive Status Examination   Orientation Status orientation to person, place and time   Cognitive Status Comments pt able to follow adl commands deanna assess functional cog further   Visual Perception   Visual Impairment/Limitations WFL   Sensory   Sensory Quick Adds sensation intact   Pain Assessment   Patient Currently in Pain No   Range of Motion Comprehensive   General Range of Motion no range of motion deficits identified   Strength Comprehensive (MMT)   General Manual Muscle Testing (MMT) Assessment   (l 4+/5 r 3/5)   Coordination   Coordination Comments l coordination wfl decrease gross and finmotor r hand   Clinical Impression   Criteria for Skilled Therapeutic Interventions Met (OT) Yes, treatment indicated   OT Diagnosis decrease adls and Iadls from PTA   Influenced by the following impairments decrease coordination strength of R u/e and L/e   OT Problem List-Impairments impacting ADL problems related to;activity tolerance impaired;balance;cognition;communication;coordination;mobility;strength   Planned Therapy Interventions (OT) ADL retraining;IADL retraining;balance training;bed mobility training;cognition;fine motor coordination training;neuromuscular re-education;orthoic fitting/training;strengthening;ROM;transfer training;home program guidelines;progressive activity/exercise;risk factor education   OT  Total Evaluation Time   OT Eval, Moderate Complexity Minutes (67330) 15   OT Goals   Therapy Frequency (OT) Daily   OT Predicted Duration/Target Date for Goal Attainment 01/12/23   OT Goals Hygiene/Grooming;Upper Body Dressing;Lower Body Dressing;Upper Body Bathing;Lower Body Bathing;Bed Mobility;Transfers;Toilet Transfer/Toileting;Meal Preparation;Home Management;Cognition   OT: Hygiene/Grooming modified independent   OT: Upper Body Dressing Modified independent   OT: Lower Body Dressing Modified independent   OT: Upper Body Bathing Modified independent   OT: Lower Body Bathing Supervision/stand-by assist   OT: Bed Mobility Modified independent   OT: Transfer Supervision/stand-by assist  (shower tranfer)   OT: Toilet Transfer/Toileting Modified independent   OT: Meal Preparation Supervision/stand-by assist   OT: Home Management Supervision/stand-by assist   OT: Cognitive Patient/caregiver will verbalize understanding of cognitive assessment results/recommendations as needed for safe discharge planning   Self-Care/Home Management   Self-Care/Home Mgmt/ADL, Compensatory, Meal Prep Minutes (23604) 30   Treatment Detail/Skilled Intervention ot focus on increasing I with l/b dressing with use of R hand as active assist   Neuromuscular Re-Education   Neuromuscular Re-Education Minutes (97032) 30   Treatment Detail/Skilled Intervention Pt seen this date for isolating shoulder  to increase stabalization of scapula to increase strength by isolating  R shoulder muscles with air splint  against gravity Shoulder flex reps 10 ext  10 reps,  adb 10  reps,add  10 reps and  horizontal abd 10 reps,add  10 reps and without air splint  shoulder protraction/retraction and elbow flex/ext and external rotation 10 reps each with 2+/5mmg 3/4 to full range all ex   OT Discharge Planning   OT Plan ot shower w/c or ambulate to shower with fww to extended tub bench   Total Session Time   Timed Code Treatment Minutes 60   Post Acute Settings  Only   What unit is patient on? Acute Rehab   OT - Acute Rehab Center Time   Individual Time (minutes) - enter zero if not applicable - OT 75   Group Time (minutes) - enter zero if not applicable  - OT 0   Concurrent Time (minutes) - enter zero if not applicable  - OT 0   Co-Treatment Time (minutes) - enter zero if not applicable  - OT 0   ARC Total Session Time (minutes) - OT 75   Upper Body Dressing   Describe performance ot sba cues for clothing orientation and use of r u/e as active assist   Lower Body Dressing (Pants/Undergarments)   Describe performance ot sba cues for use of r u/e as acative assist with pants doff/don.   Lower Body Dressing putting on/taking off footwear   Describe performance ot crossing leg over other pt able to use r hand as acative assist and don b socks   Chair/bed-to-chair Transfer   Describe performance ot min a with fww   Roll Left and Right   Assistance Needed Adaptive equipment   Physical Assistance Level No physical assistance   Roll Left to Right CARE Score 6   Sit to Lying   Assistance Needed Adaptive equipment   Physical Assistance Level No physical assistance   Sit to Lying CARE Score 6   Lying to Sitting on Side of Bed   Assistance Needed Adaptive equipment   Physical Assistance Level No physical assistance   Lying to Sitting CARE Score 6   Sit to Stand   Comment ot min a with fww   Walk 10 Feet   Comment ot min a with fww chair to bed

## 2023-01-04 NOTE — PLAN OF CARE
Discharge Planner Post-Acute Rehab PT:     Discharge Plan: Home with OP PT, Family to assist with driving    Precautions: Falls, alarms    Current Status:  Bed Mobility: MOD I  Transfer: MIN A with cane  Gait: MIN A with cane  Stairs: MIN A with B rails  Balance: Unanticipated R knee buckle at times, R hemibody neglect  RUBIO/56 on 23    Assessment:  Pt is below baseline for functional mobility s/p CVA resulting in R side weakness and incoordination. At this time, she requires walker for mobility. She lives alone and will need 10 days to achieve safety with mobility and IADLs.    Other Barriers to Discharge (DME, Family Training, etc):   Family assist for driving  Initial supervision 3-7 days to ensure safe transition

## 2023-01-04 NOTE — PHARMACY-MEDICATION REGIMEN REVIEW
Pharmacy Medication Regimen Review  Inga Coats is a 82 year old female who is currently in the Acute Rehab Unit.    Assessment: All medications have an appropriate indications, durations and no unnecessary use was found    Plan:   Continue current medication regimen   Attending provider will be sent this note for review.  If there are any emergent issues noted above, pharmacist will contact provider directly by phone.      Pharmacy will periodically review the resident's medication regimen for any PRN medications not administered in > 72 hours and discontinue them. The pharmacist will discuss gradual dose reductions of psychopharmacologic medications with interdisciplinary team on a regular basis.    Please contact pharmacy if the above does not answer specific medication questions/concerns.    Background:  A pharmacist has reviewed all medications and pertinent medical history today.  Medications were reviewed for appropriate use and any irregularities found are listed with recommendations.      Current Facility-Administered Medications:      acetaminophen (TYLENOL) tablet 325-650 mg, 325-650 mg, Oral, Q4H PRN, Joann Nowak PA-C     amLODIPine (NORVASC) tablet 5 mg, 5 mg, Oral, Daily, Joann Nowak PA-C, 5 mg at 01/04/23 0818     aspirin (ASA) chewable tablet 81 mg, 81 mg, Oral, Daily, Joann Nowak PA-C, 81 mg at 01/04/23 0818     atorvastatin (LIPITOR) tablet 80 mg, 80 mg, Oral, QPM, Joann Nowak PA-C, 80 mg at 01/03/23 1942     clopidogrel (PLAVIX) tablet 75 mg, 75 mg, Oral, Daily, Joann Nowak PA-C, 75 mg at 01/04/23 0818     multivitamin  with lutein (OCUVITE WITH LUTEIN) per capsule 1 capsule, 1 capsule, Oral, Daily, Joann Nowak PA-C, 1 capsule at 01/04/23 0834     multivitamin w/minerals (THERA-VIT-M) tablet 1 tablet, 1 tablet, Oral, Daily, Joann Nowak PA-C, 1 tablet at 01/04/23 0818     polyethylene glycol (MIRALAX) Packet 17 g, 17 g, Oral,  Daily PRN, Joann Nowak PA-C     senna-docusate (SENOKOT-S/PERICOLACE) 8.6-50 MG per tablet 1-2 tablet, 1-2 tablet, Oral, BID PRN, Joann Nowak PA-C     Vitamin D3 (CHOLECALCIFEROL) tablet 50 mcg, 50 mcg, Oral, Daily, Joann Nowak PA-C, 50 mcg at 01/04/23 0818  No current outpatient prescriptions on file.

## 2023-01-04 NOTE — PLAN OF CARE
FOCUS/GOAL  Mobility, Psychosocial needs, Safety management, and Prevention of secondary complications    ASSESSMENT, INTERVENTIONS AND CONTINUING PLAN FOR GOAL:    Goal Outcome Evaluation:        Pt Aox4 this shift. Pleasant and cooperative. No concerns for nurse at this time. Calls appropriately. Needs in reach and safety measures in place. Cont POC  Mobility: CGA fww  Bowel/Bladder: cont. LBM yest  Skin: no cocnerns  O2: RA  Diet: R/T/W  Psychosocial: appropriate to situation  Pain: denies  Tubes/Lines/Drains: none  Misc: R side weakness

## 2023-01-04 NOTE — PLAN OF CARE
FOCUS/GOAL  Medical management    ASSESSMENT, INTERVENTIONS AND CONTINUING PLAN FOR GOAL:  Patient here with Ischemic stroke, right side affected, able to make needs known  Slept most of the night  Zio patch in placed to left upper chest  No complained of pain, headache, chest pain, N&V, no SOB  V/S taken and recorded, see flowsheet for documentation  Continent of Bladder, ambulates to bathroom, NO BM this shift  Safety rounding checked completed, 3 side rails UP, bed alarm ON, call light in reach  Continue with POC    Goal Outcome Evaluation:

## 2023-01-04 NOTE — PROGRESS NOTES
"  [facility]   Acute Rehabilitation Unit  Daily progress note    INTERVAL HISTORY  Inga Coats was seen and examined at bedside this morning, also seen walking in the hallways with PT.  No acute events reported overnight.  Patient reports that she is feeling well this morning, though she found walking \"frustrating\" due to impaired coordination.  Reports to have slept well.  Denies pain, palpitations, shortness of breath, nausea, dizziness or lightheadedness.  No BM yet at ARU.  Reports eating well, \"too much\".  Encouraged fluids, states she drinks very little water at baseline so this is difficult for her.  Reviewed plans for BP management.  Denies other questions or concerns at this time.    Functionally, therapy evals underway today.    MEDICATIONS    amLODIPine  5 mg Oral Daily     aspirin  81 mg Oral Daily     atorvastatin  80 mg Oral QPM     clopidogrel  75 mg Oral Daily     multivitamin  with lutein  1 capsule Oral Daily     multivitamin w/minerals  1 tablet Oral Daily     vitamin D3  50 mcg Oral Daily        acetaminophen, polyethylene glycol, senna-docusate     PHYSICAL EXAM  BP (!) 153/67 (BP Location: Left arm)   Pulse 70   Temp (!) 96.1  F (35.6  C) (Oral)   Resp 16   Ht 1.638 m (5' 4.5\")   Wt 63.5 kg (140 lb)   SpO2 96%   BMI 23.66 kg/m    Gen: NAD, seated upright in chair  HEENT: NC/AT, MMM  Cardio: RRR, cardiac monitor L upper chest  Pulm: non-labored on room air  Abd: soft, non-tender  Ext: no appreciable edema in bilateral lower extremities  Neuro/MSK: AAO, speech clear/fluent, PERRL, EOMI, RUE 34+444, RLE 4555, incoordination/ataxia on right, sensation intact to light touch    LABS  CBC RESULTS: Recent Labs   Lab Test 12/31/22  0710 12/30/22  1103   WBC 6.4 7.2   RBC 4.43 4.48   HGB 12.6 13.1   HCT 37.7 38.2   MCV 85 85   MCH 28.4 29.2   MCHC 33.4 34.3   RDW 14.2 14.2    292     Last Basic Metabolic Panel:  Recent Labs   Lab Test " 01/02/23  1048 12/31/22  1643 12/31/22  1242 12/31/22  0710 12/31/22  0639 12/30/22  1103     --   --  137  --  135   POTASSIUM 4.4  --   --  4.1  --  4.2   CHLORIDE 105  --   --  106  --  104   CO2 25  --   --  23  --  24   ANIONGAP 5  --   --  8  --  7   GLC 95 129* 117* 113*   < > 118*   BUN 17  --   --  18  --  19   CR 0.74  --   --  0.80  --  0.77   GFRESTIMATED 79  --   --  72  --  76   JAC 9.1  --   --  9.4  --  9.8    < > = values in this interval not displayed.       Rehabilitation - continue comprehensive acute inpatient rehabilitation program with multidisciplinary approach including therapies, rehab nursing, and physiatry following. See interval history for updates.      ASSESSMENT AND PLAN  Inga Coats is a 82 year old right hand dominant female with a past medical history of CAD s/p CABG x3, aortic stenosis s/p AVR, TIA, hyperlipidemia, and CKD 3 who was admitted on 12/30/22 with subacute left corona radiata stroke with hospital course complicated by elevated BP, new prediabetes diagnosis, and fluctuating stroke symptoms.  She is now admitted to ARU on 1/3/23 for multidisciplinary rehabilitation and ongoing medical management.        Admission to acute inpatient rehab 01/03/23.    Impairment group code: Stroke Ischemic 01.2 (R) Body Involvement (L) Brain; Acute vs subacute left corona radiata stroke        1. PT, OT and SLP 60 minutes of each on a daily basis, in addition to rehab nursing and close management of physiatrist.       2. Impairment of ADL's: Noted to have impaired activity tolerance, impaired balance, impaired cognition, impaired coordination, impaired judgement and safety awareness, impaired strength, impaired tone, impaired weight shifting and impulsiveness, all affecting her ability to safely and independently perform basic ADLs.  Goal for mod I with basic ADLs.     3. Impairment of mobility:  Noted to have impaired activity tolerance, impaired balance, impaired  cognition, impaired coordination, impaired judgement and safety awareness, impaired strength, impaired tone, impaired weight shifting and impulsiveness, all affecting her ability to safely and independently perform basic mobility.  Goal for mod I with basic mobility.     4. Impairment of cognition/language/swallow:  Noted to have some possible cognitive deficits.  Would benefit from full cognitive-linguistic evaluation with SLP at ARU with goal for improved cognitive-linguistic skills to meet basic needs.     5. Medical Conditions  New actions/orders/updates for today are in blue.     Subacute left corona radiata stroke  Presented with with right hand incoordination, RUE weakness and possible shuffling gait on RLE which resolved at time of admission.    * MRI brain showed acute to subacute infarct in the left corona radiata.   * MRA neck - Patent arteries in the neck without evidence of dissection.  Apparent atherosclerotic disease of right carotid bifurcation and proximal internal carotid artery which appears to cause approximately 50% stenosis by NASCET criteria  * Loaded with plavix in ED, then started on ASA, Plavix.    * 12/31 AM: Recurrent symptoms of right upper ext ataxia, drift, weakness. Neurology contacted. Repeat CT/CTA head and neck CTA without changes (see report for detail).  * Per neuro, stroke likely due to intracranial atherosclerosis  * TTE-LVEF 65-70%, no thrombus seen in LV  * 1/1 - reporting more weakness in RUE. Neuro evaluated on 1/1 due to flunctuating symptoms, recommended to avoid hypotension, MRI brain w/o if sxs persist.  RUE weakness has been stable since per inpatient team.  - Continue aspirin 81 mg daily  - Continue Plavix 75 mg daily x90 days (started on 12/31)  - PTA Lipitor increased to 80 mg daily, LDL 96  - Outpatient BP <130/80.  Avoid hypotenstion.  Management as below.  - 30-day cardiac monitor at discharge per neurology.  Monitor in place at ARU admission.  - Stroke PLC  completed while inpatient on 1/3  - Continue PT/OT/SLP  - Follow-up with [facility]     Hypertension  BP elevated, started amlodipine 5mg daily on 1/3.    - Per neurology, avoid rapid decrease in BP and needs gradual titration of medication for optimal BP control with long-term (OP) goal <130/80.  - Continue amlodipine 5 mg daily  - BP remains elevated 140s-150s, but just started on amlodipine 1/3 with goal for gradual reduction.  - Monitor BP     CAD status post three-vessel CABG  Aortic stenosis s/p aortic valve replacement  - Antiplatelet and statin therapy as above     Hyperlipidemia  PTA statin increased as above due to LDL 96.  - Continue atorvastatin 80 mg daily  - Goal LDL <70     CKD 3  Baseline Cr ~1.  Stable this admission 0.7-0.8.  - Avoid nephrotoxins as able  - Monitor BMP every M/Th     Hyperglycemia w/ prediabetes  Newly identified this admission, A1c 6%  - Goal A1c <7 for secondary stroke prevention  - Follow up with PCP for ongoing management/surveillance       6. Adjustment to disability:  Clinical psychology to eval and treat if indicated  7. FEN: regular diet, thin liquids  8. Bowel: continent, monitor, PRN bowel meds available  9. Bladder: continent, PVRs at admission with no evidence of retention  10. DVT Prophylaxis: mechanical  11. GI Prophylaxis: not indicated  12. Code: full, confirmed at admission  13. Disposition: goal for home  14. ELOS:  8 days  15. Follow up Appointments on Discharge: PCP in 1-2 weeks, stroke neurology ([facility]) in 6-8 weeks        Patient discussed with Dr. Edgard Bryant, PM&R Staff Physician    Joann Nowak PA-C  Physical Medicine & Rehabilitation

## 2023-01-05 ENCOUNTER — APPOINTMENT (OUTPATIENT)
Dept: PHYSICAL THERAPY | Facility: CLINIC | Age: 83
DRG: 057 | End: 2023-01-05
Attending: PHYSICAL MEDICINE & REHABILITATION
Payer: COMMERCIAL

## 2023-01-05 ENCOUNTER — APPOINTMENT (OUTPATIENT)
Dept: SPEECH THERAPY | Facility: CLINIC | Age: 83
DRG: 057 | End: 2023-01-05
Attending: PHYSICAL MEDICINE & REHABILITATION
Payer: COMMERCIAL

## 2023-01-05 ENCOUNTER — APPOINTMENT (OUTPATIENT)
Dept: OCCUPATIONAL THERAPY | Facility: CLINIC | Age: 83
DRG: 057 | End: 2023-01-05
Attending: PHYSICAL MEDICINE & REHABILITATION
Payer: COMMERCIAL

## 2023-01-05 LAB
ANION GAP SERPL CALCULATED.3IONS-SCNC: 9 MMOL/L (ref 3–14)
BUN SERPL-MCNC: 22 MG/DL (ref 7–30)
CALCIUM SERPL-MCNC: 9.9 MG/DL (ref 8.5–10.1)
CHLORIDE BLD-SCNC: 101 MMOL/L (ref 94–109)
CO2 SERPL-SCNC: 23 MMOL/L (ref 20–32)
CREAT SERPL-MCNC: 0.74 MG/DL (ref 0.52–1.04)
ERYTHROCYTE [DISTWIDTH] IN BLOOD BY AUTOMATED COUNT: 13.6 % (ref 10–15)
GFR SERPL CREATININE-BSD FRML MDRD: 79 ML/MIN/1.73M2
GLUCOSE BLD-MCNC: 96 MG/DL (ref 70–99)
HCT VFR BLD AUTO: 38.6 % (ref 35–47)
HGB BLD-MCNC: 13.2 G/DL (ref 11.7–15.7)
MCH RBC QN AUTO: 28.9 PG (ref 26.5–33)
MCHC RBC AUTO-ENTMCNC: 34.2 G/DL (ref 31.5–36.5)
MCV RBC AUTO: 85 FL (ref 78–100)
PLATELET # BLD AUTO: 334 10E3/UL (ref 150–450)
POTASSIUM BLD-SCNC: 4.6 MMOL/L (ref 3.4–5.3)
RBC # BLD AUTO: 4.56 10E6/UL (ref 3.8–5.2)
SODIUM SERPL-SCNC: 133 MMOL/L (ref 133–144)
WBC # BLD AUTO: 7.4 10E3/UL (ref 4–11)

## 2023-01-05 PROCEDURE — 80048 BASIC METABOLIC PNL TOTAL CA: CPT | Performed by: PHYSICIAN ASSISTANT

## 2023-01-05 PROCEDURE — 85027 COMPLETE CBC AUTOMATED: CPT | Performed by: PHYSICIAN ASSISTANT

## 2023-01-05 PROCEDURE — 97129 THER IVNTJ 1ST 15 MIN: CPT | Mod: GN

## 2023-01-05 PROCEDURE — 128N000003 HC R&B REHAB

## 2023-01-05 PROCEDURE — 97530 THERAPEUTIC ACTIVITIES: CPT | Mod: GO

## 2023-01-05 PROCEDURE — 999N000125 HC STATISTIC PATIENT MED CONFERENCE < 30 MIN: Performed by: SPEECH-LANGUAGE PATHOLOGIST

## 2023-01-05 PROCEDURE — 97129 THER IVNTJ 1ST 15 MIN: CPT | Mod: GN | Performed by: SPEECH-LANGUAGE PATHOLOGIST

## 2023-01-05 PROCEDURE — 250N000013 HC RX MED GY IP 250 OP 250 PS 637: Performed by: PHYSICIAN ASSISTANT

## 2023-01-05 PROCEDURE — 999N000150 HC STATISTIC PT MED CONFERENCE < 30 MIN

## 2023-01-05 PROCEDURE — 97130 THER IVNTJ EA ADDL 15 MIN: CPT | Mod: GN | Performed by: SPEECH-LANGUAGE PATHOLOGIST

## 2023-01-05 PROCEDURE — 97110 THERAPEUTIC EXERCISES: CPT | Mod: GP

## 2023-01-05 PROCEDURE — 99232 SBSQ HOSP IP/OBS MODERATE 35: CPT | Mod: FS | Performed by: PHYSICIAN ASSISTANT

## 2023-01-05 PROCEDURE — 97130 THER IVNTJ EA ADDL 15 MIN: CPT | Mod: GN

## 2023-01-05 PROCEDURE — 97535 SELF CARE MNGMENT TRAINING: CPT | Mod: GO

## 2023-01-05 PROCEDURE — 999N000125 HC STATISTIC PATIENT MED CONFERENCE < 30 MIN

## 2023-01-05 PROCEDURE — 36415 COLL VENOUS BLD VENIPUNCTURE: CPT | Performed by: PHYSICIAN ASSISTANT

## 2023-01-05 RX ADMIN — ASPIRIN 81 MG CHEWABLE TABLET 81 MG: 81 TABLET CHEWABLE at 08:21

## 2023-01-05 RX ADMIN — AMLODIPINE BESYLATE 5 MG: 5 TABLET ORAL at 08:21

## 2023-01-05 RX ADMIN — CLOPIDOGREL BISULFATE 75 MG: 75 TABLET, FILM COATED ORAL at 08:21

## 2023-01-05 RX ADMIN — ATORVASTATIN CALCIUM 80 MG: 80 TABLET, FILM COATED ORAL at 19:32

## 2023-01-05 RX ADMIN — Medication 1 CAPSULE: at 08:21

## 2023-01-05 RX ADMIN — Medication 50 MCG: at 08:20

## 2023-01-05 ASSESSMENT — ACTIVITIES OF DAILY LIVING (ADL)
ADLS_ACUITY_SCORE: 35
ADLS_ACUITY_SCORE: 33
ADLS_ACUITY_SCORE: 35
ADLS_ACUITY_SCORE: 33
ADLS_ACUITY_SCORE: 33
ADLS_ACUITY_SCORE: 35

## 2023-01-05 NOTE — PLAN OF CARE
Discharge Planner Post-Acute Rehab PT:     Discharge Plan: Home with OP PT, Family to assist with driving    Precautions: Falls, alarms    Current Status:  Bed Mobility: MOD I  Transfer: MIN A with cane  Gait: MIN A with cane  Stairs: MIN A with B rails  Balance: Unanticipated R knee buckle at times, R hemibody neglect  RUBIO/56 on 23    Assessment: Pt is eager to be MOD I in room, but RUBIO score and safety awareness are barriers to this. Working on R foot clearance and stance phase stability.     Other Barriers to Discharge (DME, Family Training, etc):   Family assist for driving  Family supervision to ensure safety

## 2023-01-05 NOTE — PLAN OF CARE
[id number]   No changes overnight  AOx4. Denies CP, SOB, N/T.  Voiding without difficulty. LBM 1/4/23  Pt denies pain this shift  Call light within reach at all times. Pt able to make needs known. Bed alarm on for safety.  Continue with POC

## 2023-01-05 NOTE — PROGRESS NOTES
"Acute Rehab Care Conference/Team Rounds      Type: Team Rounds    Present: Dr. Edgard Bryant, Joann Nowak PA, Charmaine Gabriel PT, Mathieu Mcgregor OT, Joanne Duong SLP, Anastasiya Oliver [facility], Monica Lozada RD, Essie Do RN, and Inga \"Eleanor\" Jorge L Patient.    Discharge Barriers/Treatment/Education    Rehab Diagnosis: R hemiparesis post CVA     Active Medical Co-morbidities/Prognosis:     Patient Active Problem List   Diagnosis Code     Cerebrovascular accident (CVA), unspecified mechanism (H) I63.9   gait instability   HTN      Safety: AOX4. Pt able to make needs known. Call light within reach at all times. Alarm on overnight for safety.     Pain: Pt denies pain    Medications, Skin, Tubes/Lines: Takes pills whole. No skin issues. No tubes/lines.    Swallowing/Nutrition: Regular/thin (0); not evaluated on ARU and no concerns.    Bowel/Bladder: Continent of bowel/bladdder. Up to BR. Voiding without difficulty. LBM 1/4/23 per pt report.    Psychosocial: SW assessment pending, will be completed by end of today. See H&P for more information.     ADLs/IADLs: Pt early in ARU stay; full ADL assessment pending PM shower session. Pt lives alone in accessible apartment and previously IND ADLs and IADLs. Currently requiring min A with mobility device for functional transfers, and increased assist d/t R-side weakness and UE/LE incoordination, R inattention, and cognitive deficits impacting memory and higher-level reasoning and EF. Pending progress and further assessment, plan for family to provide initial supervision and IADL A at discharge at pt's apartment with likely OP OT. DME pending progress.    Mobility: Pt is below baseline for functional mobility s/p CVA resulting in R side weakness and incoordination. At this time, she requires walker for mobility. She lives alone and will need 10 days to achieve safety with mobility and IADLs.  Bed Mobility: MOD I  Transfer: MIN A with cane  Gait: MIN A with cane  Stairs: MIN A with " B rails  Balance: Unanticipated R knee buckle at times, R hemibody neglect  RUBIO/56 on 23    Cognition/Language: Language and motor speech WNL. Pt reported awareness of memory deficits, overall score/rating from CLQT WNL with executive function domain borderline WNL/mild. Subjectively, during test pt demo'd difficulty with high complexity executive functioning tasks, recognized errors but had difficulty correcting independently.    Community Re-Entry: reach a level of mod I     Transportation: Not a  - family to provide.    Decision maker: self    Plan of Care and goals reviewed and updated.    Discharge Plan/Recommendations    Fall Precautions: continue    Patient/Family input to goals: yes     Estimated length of stay: 10 days     Overall plan for the patient: reach a level of mod I       Utilization Review and Continued Stay Justification    Medical Necessity Criteria:    For any criteria that is not met, please document reason and plan for discharge, transfer, or modification of plan of care to address.    Requires intensive rehabilitation program to treat functional deficits?: Yes    Requires 3x per week or greater involvement of rehabilitation physician to oversee rehabilitation program?: Yes    Requires rehabilitation nursing interventions?: Yes    Patient is making functional progress?: Yes    There is a potential for additional functional progress? Yes    Patient is participating in therapy 3 hours per day a minimum of 5 days per week or 15 hours per week in 7 day period?:Yes    Has discharge needs that require coordinated discharge planning approach?:Yes      Barriers/Concerns related to meeting medical necessity criteria:  none    Team Plan to Address Concern:  As needed       Final Physician Sign off    Statement of Approval: Edgard Bryant, DO      Patient Goals  Social Work Goals: Confirm discharge recommendations with therapy, coordinate safe discharge plan and remain available to  support and assist as needed.    OT Predicted Duration/Target Date for Goal Attainment: 01/12/23  Therapy Frequency (OT): Daily  OT: Hygiene/Grooming: modified independent  OT: Upper Body Dressing: Modified independent  OT: Lower Body Dressing: Modified independent  OT: Upper Body Bathing: Modified independent  OT: Lower Body Bathing: Supervision/stand-by assist  OT: Bed Mobility: Modified independent  OT: Transfer: Supervision/stand-by assist (shower tranfer)  OT: Toilet Transfer/Toileting: Modified independent  OT: Meal Preparation: Supervision/stand-by assist  OT: Home Management: Supervision/stand-by assist  OT: Cognitive: Patient/caregiver will verbalize understanding of cognitive assessment results/recommendations as needed for safe discharge planning    PT Frequency: Daily  PT: Bed Mobility: Supine to/from sit, Independent  PT: Transfers: Independent, Sit to/from stand, Bed to/from chair  PT: Gait: Modified independent, Rolling walker, Greater than 200 feet  PT: Goal 1: Car transfer into family vehicle, SBA      SLP Predicted Duration/Target Date for Goal Attainment: 01/13/23  Therapy Frequency (SLP Eval): daily  SLP: Goal 1: Patient will complete high complexity reasoning/problem solving tasks with 90% accuracy and minimal cues from SLP.  SLP: Goal 2: Patient will recall new, complex information with use of trained memory strategies and no cues from SLP.     Nursing Goal: Medical Management: Patient will verbalize 3 ways to maintain blood pressure prior to discharge.  Nursing Patient/Family Goal: Medication Management: Patient will pass MAP and demonstrate knowlege of current medications prior to discharge.  Nursing Goal: Safety Management: Patient will use call light appropriately and remain free of falls during ARU stay.

## 2023-01-05 NOTE — PROGRESS NOTES
Post Rounds Family Phone Call  Staff involved: Mathieu Mcgregor OTR/L  Length of call:   Family involved: Prachi, pt's daughter  Projected discharge date: 1/12/23  Projected discharge location: Pt's apartment with family A  Equipment needs: mobility device and needs TBD pending progress  Other questions and misc: Updated daughter on rounds discussion and target discharge date for Thursday 1/12/23. Prachi confirmed intention for 24/7 A at discharge from family; however, details need to be finalized amongst siblings. Due to current pt presentation, educated on anticipated need for family IADL support for medication mgmt, financial mgmt, and driving and initial 24/7 supervision for safety. Agreed to schedule family training for early next week; Prachi to confer with siblings on best date/who will attend and will update pt or writer via telephone tomorrow.

## 2023-01-05 NOTE — PLAN OF CARE
Discharge Planner Post-Acute Rehab SLP:     Discharge Plan: home with ongoing SLP    Precautions: None from SLP perspective    Current Status:  Hearing: WNL  Vision: Reading glasses  Communication: WNL  Cognition: Mild cognitive impairment; mild reasoning and min-mild memory  Swallow: Regular/thin (0); not evaluated on ARU, no concerns    Assessment: Instructed pt in basic-moderate reasoning task with closet organization, pt completed with 100% accuracy with no cues from SLP. Instructed pt in moderate level deductive reasoning puzzle (#1), pt required mild-moderate cues to complete with 100% accuracy. Functional reasoning/problem solving for daily situations, pt required mid cues to generate multiple solutions.    Other Barriers to Discharge (Family Training, etc): family training in strategies/support to increase independence.

## 2023-01-05 NOTE — PROGRESS NOTES
"  [facility]   Acute Rehabilitation Unit  Daily progress note    INTERVAL HISTORY  Inga Coats was seen at bedside this morning during team rounds.  No acute events reported overnight.  Patient reports that she is feeling well overall, though she is finding it difficult to be patient with the pace of stroke recovery.    Functionally, she scored 32 on RUBIO and still needs assistance for ambulating in her room though she is eager to be more independent.  SLP noting some impairments in executive functioning and memory, which may contribute.  Discussed that she may need some assistance for IADLs and no driving initially at discharge.  Therapy team will provide update to daughter.  For full functional updates, see team rounds note from today.    MEDICATIONS    amLODIPine  5 mg Oral Daily     aspirin  81 mg Oral Daily     atorvastatin  80 mg Oral QPM     clopidogrel  75 mg Oral Daily     multivitamin  with lutein  1 capsule Oral Daily     multivitamin w/minerals  1 tablet Oral Daily     vitamin D3  50 mcg Oral Daily        acetaminophen, polyethylene glycol, senna-docusate     PHYSICAL EXAM  BP (!) 155/69 (BP Location: Right arm)   Pulse 72   Temp 97  F (36.1  C) (Oral)   Resp 16   Ht 1.638 m (5' 4.5\")   Wt 63.5 kg (140 lb)   SpO2 94%   BMI 23.66 kg/m     Gen: NAD, seated upright in chair  HEENT: NC/AT, MMM  Cardio: RRR, cardiac monitor L upper chest  Pulm: non-labored on room air  Abd: soft, non-tender  Ext: no appreciable edema in bilateral lower extremities  Neuro/MSK: AAO, speech clear/fluent, PERRL, EOMI, R-sided incoordination/ataxia    LABS  CBC RESULTS:   Recent Labs   Lab Test 12/31/22  0710 12/30/22  1103   WBC 6.4 7.2   RBC 4.43 4.48   HGB 12.6 13.1   HCT 37.7 38.2   MCV 85 85   MCH 28.4 29.2   MCHC 33.4 34.3   RDW 14.2 14.2    292     Last Basic Metabolic Panel:  Recent Labs   Lab Test 01/02/23  1048 12/31/22  1643 12/31/22  1242 12/31/22  0710 " 12/31/22  0639 12/30/22  1103     --   --  137  --  135   POTASSIUM 4.4  --   --  4.1  --  4.2   CHLORIDE 105  --   --  106  --  104   CO2 25  --   --  23  --  24   ANIONGAP 5  --   --  8  --  7   GLC 95 129* 117* 113*   < > 118*   BUN 17  --   --  18  --  19   CR 0.74  --   --  0.80  --  0.77   GFRESTIMATED 79  --   --  72  --  76   JAC 9.1  --   --  9.4  --  9.8    < > = values in this interval not displayed.       Rehabilitation - continue comprehensive acute inpatient rehabilitation program with multidisciplinary approach including therapies, rehab nursing, and physiatry following. See interval history for updates.      ASSESSMENT AND PLAN  Inga Coats is a 82 year old right hand dominant female with a past medical history of CAD s/p CABG x3, aortic stenosis s/p AVR, TIA, hyperlipidemia, and CKD 3 who was admitted on 12/30/22 with subacute left corona radiata stroke with hospital course complicated by elevated BP, new prediabetes diagnosis, and fluctuating stroke symptoms.  She is now admitted to ARU on 1/3/23 for multidisciplinary rehabilitation and ongoing medical management.        Admission to acute inpatient rehab 01/03/23.    Impairment group code: Stroke Ischemic 01.2 (R) Body Involvement (L) Brain; Acute vs subacute left corona radiata stroke        1. PT, OT and SLP 60 minutes of each on a daily basis, in addition to rehab nursing and close management of physiatrist.       2. Impairment of ADL's: Noted to have impaired activity tolerance, impaired balance, impaired cognition, impaired coordination, impaired judgement and safety awareness, impaired strength, impaired tone, impaired weight shifting and impulsiveness, all affecting her ability to safely and independently perform basic ADLs.  Goal for mod I with basic ADLs.     3. Impairment of mobility:  Noted to have impaired activity tolerance, impaired balance, impaired cognition, impaired coordination, impaired judgement and safety  awareness, impaired strength, impaired tone, impaired weight shifting and impulsiveness, all affecting her ability to safely and independently perform basic mobility.  Goal for mod I with basic mobility.     4. Impairment of cognition/language/swallow:  Noted to have some possible cognitive deficits.  Would benefit from full cognitive-linguistic evaluation with SLP at ARU with goal for improved cognitive-linguistic skills to meet basic needs.     5. Medical Conditions  New actions/orders/updates for today are in blue.     Subacute left corona radiata stroke  Presented with with right hand incoordination, RUE weakness and possible shuffling gait on RLE which resolved at time of admission.    * MRI brain showed acute to subacute infarct in the left corona radiata.   * MRA neck - Patent arteries in the neck without evidence of dissection.  Apparent atherosclerotic disease of right carotid bifurcation and proximal internal carotid artery which appears to cause approximately 50% stenosis by NASCET criteria  * Loaded with plavix in ED, then started on ASA, Plavix.    * 12/31 AM: Recurrent symptoms of right upper ext ataxia, drift, weakness. Neurology contacted. Repeat CT/CTA head and neck CTA without changes (see report for detail).  * Per neuro, stroke likely due to intracranial atherosclerosis  * TTE-LVEF 65-70%, no thrombus seen in LV  * 1/1 - reporting more weakness in RUE. Neuro evaluated on 1/1 due to flunctuating symptoms, recommended to avoid hypotension, MRI brain w/o if sxs persist.  RUE weakness has been stable since per inpatient team.  - Continue aspirin 81 mg daily  - Continue Plavix 75 mg daily x90 days (started on 12/31)  - PTA Lipitor increased to 80 mg daily, LDL 96  - Outpatient BP <130/80.  Avoid hypotenstion.  Management as below.  - 30-day cardiac monitor at discharge per neurology.  Monitor in place at ARU admission.  - Stroke PLC completed while inpatient on 1/3  - Continue PT/OT/SLP  - Follow-up  with [facility]     Hypertension  BP elevated, started amlodipine 5mg daily on 1/3.    - Per neurology, avoid rapid decrease in BP and needs gradual titration of medication for optimal BP control with long-term (OP) goal <130/80.  - Continue amlodipine 5 mg daily  - BP still running slightly above goal but improving.  Medication changes deferred today given just started on amlodipine 1/3 with goal for gradual reduction but will continue to monitor and increase if indicated.  - Monitor BP     CAD status post three-vessel CABG  Aortic stenosis s/p aortic valve replacement  - Antiplatelet and statin therapy as above     Hyperlipidemia  PTA statin increased as above due to LDL 96.  - Continue atorvastatin 80 mg daily  - Goal LDL <70     CKD 3  Baseline Cr ~1.  Stable this admission 0.7-0.8.  - Avoid nephrotoxins as able  - Monitor BMP every M/Th     Hyperglycemia w/ prediabetes  Newly identified this admission, A1c 6%  - Goal A1c <7 for secondary stroke prevention  - Follow up with PCP for ongoing management/surveillance       6. Adjustment to disability:  Clinical psychology to eval and treat if indicated  7. FEN: regular diet, thin liquids  8. Bowel: continent, monitor, PRN bowel meds available  9. Bladder: continent, PVRs at admission with no evidence of retention  10. DVT Prophylaxis: mechanical  11. GI Prophylaxis: not indicated  12. Code: full, confirmed at admission  13. Disposition: goal for home  14. ELOS:  8 days  15. Follow up Appointments on Discharge: PCP in 1-2 weeks, stroke neurology ([facility]) in 6-8 weeks        Patient seen and discussed with Dr. Edgard Bryant, PM&R Staff Physician    AIDA Romo-C  Physical Medicine & Rehabilitation

## 2023-01-05 NOTE — DISCHARGE INSTRUCTIONS
PCP   You are scheduled to see Dr. Ramires on 01/17/2023 at 10:50AM.    Address [address] [address]      Phone  [phone]  Fax  [phone]    Stroke Neurology   You are scheduled to see Rosalia Nazario NP on 03/06/2023 at 9:45AM. Please bring your insurance card. You will receive a phone call or text message to confirm your appointment a few days prior to 03/06/2023.    Address [address]    [facility] [address]    [facility] [address]    Phone  [phone]  Fax   [phone]    Home Health Care:   [facility]   PH: [phone]  F: [phone] or F: [phone]  Nurse, physical therapy, occupational therapy, speech therapy, home health aide.     [place] [facility] [place] and Association  Additional resources and contact information online   Www.strokemn.org & www.braininjurymn.org  Types of services that Stroke/Brain Injury Resource Facilitation offers include:  Emotional support in coping with a  new normal   Finding mental health support and counselors who understand brain injury and stroke  Finding a support group  Finding or re-connecting to rehabilitation services  Finding where and how to get a brain injury assessed  Finding or re-connecting with a primary care physician  Supporting caregivers by connecting them with resources  Education about diagnosis and symptoms -  Is this normal   Helping educate family and loved ones of the survivor s  invisible  symptoms  Figuring out the logistics of returning to work, vocational rehab and understanding ADA rights  If not going back to work, support in finding meaningful ways to structure your day and exploring volunteer options  Coaching on navigation the federal, state and county health and disability service system with additional support and conference calls as needed  Help finding appropriate legal support for appealing and navigating Social Security Disability, providing advocacy on the individual s  "behalf as needed and connecting with cost effective alternatives to  as needed  Supporting parents/students with how to discuss return to school and sports with educators and connecting to a TBI specialist or parent advocate group if needed  Connecting to addiction (alcohol/drug/gambling/smoking) support and treatment services  Support with creative problem solving to life barriers.  *These are just a few examples of common things that Resource Facilitation can help with. They are not limited to what is listed here so if you are curious if they can help, just ask.   Call us at [phone] or [phone].      To reduce the risk of subsequent stroke there are several important factors including optimal management of anticoagulants, blood pressure, cholesterol, diabetes and smoking abstinence.    Anticoagulation:  You are on Aspirin and Clopidogrel for 90 days (thru 3/31/23) then Aspirin alone.    Blood Pressure:  Keeping your blood pressures less than 130/80 has been shown to reduce risk of recurrent stroke. Recording your blood pressure and heart rate once daily in a log book can help you and your providers make decisions on optimal management. You are encouraged to bring your log book with you to your primary physician and/or cardiology doctor visits.    You are currently on amlodipine to help control your blood pressure. Several lifestyle modifications have been associated with blood pressure reduction and are an important part of a comprehensive plan. These include: weight loss (if over-weight); a diet low in salt and cholesterol and rich in fruits and vegetables; regular aerobic physical activity and limited alcohol consumption.    Diabetes:  Optimal management of diabetes not only reduces risk of another stroke, it can help with healing process from your recent stroke. Blood glucose levels measure how well you're controlling your diabetes. An additional test \"hemoglobin A1C\" reflects how you have been " "overall with glucose control in the prior few months. This should be less than 7 though even lower below 6 is considered normal. Your recent Hgb A1C was 6.0, which is indicative of pre-diabetes. This should be followed up with your primary provider.    Diet:  Diet and exercise are very important for diabetes regardless of being on medication or not. Be aware of and moderate your carbohydrate intake as instructed by doctor, dietitian or nurse.  Regular physical activity may be more difficult since your stroke though doing what you can on a daily basis is helpful.     Cholesterol:  Traditional target levels for LDL cholesterol or \"bad cholesterol\" is less than 130 however once you have had a stroke, your target LDL level is now less than 70. Additional recommendations such as increasing your HDL or \"good\" cholesterol and lowering your triglyceride level can also be important.    Your most recent lipid panel was   Lab Results   Component Value Date    CHOL 214 12/30/2022     Lab Results   Component Value Date    HDL 93 12/30/2022     Lab Results   Component Value Date    LDL 96 12/30/2022     Lab Results   Component Value Date    TRIG 127 12/30/2022     No results found for: CHOLHDLRATIO    This should be followed up in 2-3 months with your primary provider.    Smoking:  Finally one of the most important modifiable risk factors is to not smoke. This includes cigarettes, pipes, cigars, chewing tobacco and second hand smoke. Support through counseling, nicotine replacement, and oral smoking-cessation medications may all be helpful. Often people have been able to quit during their hospitalization but once returning to their familiar environment, the urges can be stronger. If this is the case, we encourage you to get support. There are numerous options, start by talking with your doctor.    "

## 2023-01-05 NOTE — PLAN OF CARE
Goal Outcome Evaluation:      Plan of Care Reviewed With: patient    Overall Patient Progress: improvingOverall Patient Progress: improving    Outcome Evaluation: pt cont of B&B, participating in therapies, denies pain, A1 with walker.    Pt A&Ox4. A1 with walker. Cont of B&B. Denies pain, SOB, headache, nausea or new numbness/tingling. No new skin issues. Call light in reach, alarms on, continue POC.

## 2023-01-05 NOTE — PLAN OF CARE
Discharge Planner Post-Acute Rehab OT:     Discharge Plan: ILF with family A, OP OT    Precautions: falls, alarms    Current Status:  ADLs:    Mobility: bed mobility mod I, min a transfers    Grooming: tba    Dressing: U/B tba, L/B pants sba    Feet sba sitting reaching to floor pt able to tie b shoes with extra time and mod effort    Bathing: tba    Toileting:tba  IADLs: tba  Vision/Cognition: Glasses. Deficits in memory, R attention, inhibition, and higher level EF noted functionally.    Assessment: Pt seen for interdisciplinary rounds, see POC note for details. Performed simple meal prep task requiring A for organization and fall safety. Plan for shower tomorrow d/t schedule error.    Other Barriers to Discharge (DME, Family Training, etc): Walk in shower, grab bars; stadard toilet.

## 2023-01-06 ENCOUNTER — APPOINTMENT (OUTPATIENT)
Dept: OCCUPATIONAL THERAPY | Facility: CLINIC | Age: 83
DRG: 057 | End: 2023-01-06
Attending: PHYSICAL MEDICINE & REHABILITATION
Payer: COMMERCIAL

## 2023-01-06 ENCOUNTER — APPOINTMENT (OUTPATIENT)
Dept: SPEECH THERAPY | Facility: CLINIC | Age: 83
DRG: 057 | End: 2023-01-06
Attending: PHYSICAL MEDICINE & REHABILITATION
Payer: COMMERCIAL

## 2023-01-06 ENCOUNTER — APPOINTMENT (OUTPATIENT)
Dept: PHYSICAL THERAPY | Facility: CLINIC | Age: 83
DRG: 057 | End: 2023-01-06
Attending: PHYSICAL MEDICINE & REHABILITATION
Payer: COMMERCIAL

## 2023-01-06 PROCEDURE — 128N000003 HC R&B REHAB

## 2023-01-06 PROCEDURE — 97535 SELF CARE MNGMENT TRAINING: CPT | Mod: GO

## 2023-01-06 PROCEDURE — 97130 THER IVNTJ EA ADDL 15 MIN: CPT | Mod: GN | Performed by: SPEECH-LANGUAGE PATHOLOGIST

## 2023-01-06 PROCEDURE — 97129 THER IVNTJ 1ST 15 MIN: CPT | Mod: GN | Performed by: SPEECH-LANGUAGE PATHOLOGIST

## 2023-01-06 PROCEDURE — 250N000013 HC RX MED GY IP 250 OP 250 PS 637: Performed by: PHYSICIAN ASSISTANT

## 2023-01-06 PROCEDURE — 97116 GAIT TRAINING THERAPY: CPT | Mod: GP

## 2023-01-06 PROCEDURE — 97112 NEUROMUSCULAR REEDUCATION: CPT | Mod: GP

## 2023-01-06 RX ADMIN — AMLODIPINE BESYLATE 5 MG: 5 TABLET ORAL at 08:25

## 2023-01-06 RX ADMIN — CLOPIDOGREL BISULFATE 75 MG: 75 TABLET, FILM COATED ORAL at 08:25

## 2023-01-06 RX ADMIN — Medication 50 MCG: at 08:25

## 2023-01-06 RX ADMIN — ATORVASTATIN CALCIUM 80 MG: 80 TABLET, FILM COATED ORAL at 20:12

## 2023-01-06 RX ADMIN — ASPIRIN 81 MG CHEWABLE TABLET 81 MG: 81 TABLET CHEWABLE at 08:25

## 2023-01-06 RX ADMIN — Medication 1 CAPSULE: at 08:25

## 2023-01-06 ASSESSMENT — ACTIVITIES OF DAILY LIVING (ADL)
ADLS_ACUITY_SCORE: 35

## 2023-01-06 NOTE — PLAN OF CARE
Goal Outcome Evaluation:      Plan of Care Reviewed With: patient    Overall Patient Progress: no change    Outcome Evaluation: No change in pt progress this shift.    FOCUS/GOAL: Medication, Medical and Safety Management.       ASSESSMENT, INTERVENTIONS AND CONTINUING PLAN FOR GOAL:  Orientation: A&OX4  Ambulation/Transfer: AX1 with walker and GB  Bladder/ Bowel: Continent, uses toilet in bathroom  Pain: Denies  Diet: Regular, thin liquids  Meds: Pills whole with water  Tubes/Lines/Drains: None  Skin: No new skin concerns  Pt continues to be at baseline, family at bedside during shift, raised no concerns, will continue with POC.

## 2023-01-06 NOTE — PROGRESS NOTES
"  [facility]   Acute Rehabilitation Unit  Daily progress note    INTERVAL HISTORY  Inga Coats was seen at bedside this morning.  No acute events reported overnight.  She just finished first PT session, states it did not go as well as yesterday.  Felt like her leg was moving well, but having troulbe with her trunk not moving in coordination.  Not sure why, but she is slightly frustrated by this, finding it difficult to be patient with stroke recovery.  Reassured and discussed typical stroke recovery.  She reports to be sleeping well, denies pain, palpitations, shortness of breath, dizziness, nausea, bowel or bladder concerns/changes.    Functionally, she is currently mod I with bed mobility, min A for transfers and gait with cane, including min A on stairs with bilateral rails. Does demonstrate unanticipated R knee buckle at times, R hemibody neglect.    MEDICATIONS    amLODIPine  5 mg Oral Daily     aspirin  81 mg Oral Daily     atorvastatin  80 mg Oral QPM     clopidogrel  75 mg Oral Daily     multivitamin  with lutein  1 capsule Oral Daily     vitamin D3  50 mcg Oral Daily        acetaminophen, polyethylene glycol, senna-docusate     PHYSICAL EXAM  /54 (BP Location: Right arm)   Pulse 70   Temp 97.5  F (36.4  C) (Oral)   Resp 16   Ht 1.638 m (5' 4.5\")   Wt 63.5 kg (140 lb)   SpO2 95%   BMI 23.66 kg/m     Gen: NAD, seated upright in chair  HEENT: NC/AT, MMM  Cardio: RRR, no murmurs, cardiac monitor L upper chest  Pulm: non-labored on room air, lungs CTA bilaterally  Abd: soft, non-tender, non-distended, bowel sounds present  Ext: no appreciable edema in bilateral lower extremities  Neuro/MSK: AAO, speech clear/fluent, PERRL, EOMI, R-sided incoordination/ataxia    LABS  CBC RESULTS:   Recent Labs   Lab Test 01/05/23  1127 12/31/22  0710 12/30/22  1103   WBC 7.4 6.4 7.2   RBC 4.56 4.43 4.48   HGB 13.2 12.6 13.1   HCT 38.6 37.7 38.2   MCV 85 85 85   MCH 28.9 " 28.4 29.2   MCHC 34.2 33.4 34.3   RDW 13.6 14.2 14.2    288 292     Last Basic Metabolic Panel:  Recent Labs   Lab Test 01/05/23  1127 01/02/23  1048 12/31/22  1643 12/31/22  1242 12/31/22  0710    135  --   --  137   POTASSIUM 4.6 4.4  --   --  4.1   CHLORIDE 101 105  --   --  106   CO2 23 25  --   --  23   ANIONGAP 9 5  --   --  8   GLC 96 95 129*   < > 113*   BUN 22 17  --   --  18   CR 0.74 0.74  --   --  0.80   GFRESTIMATED 79 79  --   --  72   JAC 9.9 9.1  --   --  9.4    < > = values in this interval not displayed.       Rehabilitation - continue comprehensive acute inpatient rehabilitation program with multidisciplinary approach including therapies, rehab nursing, and physiatry following. See interval history for updates.      ASSESSMENT AND PLAN  Inga Coats is a 82 year old right hand dominant female with a past medical history of CAD s/p CABG x3, aortic stenosis s/p AVR, TIA, hyperlipidemia, and CKD 3 who was admitted on 12/30/22 with subacute left corona radiata stroke with hospital course complicated by elevated BP, new prediabetes diagnosis, and fluctuating stroke symptoms.  She is now admitted to ARU on 1/3/23 for multidisciplinary rehabilitation and ongoing medical management.        Admission to acute inpatient rehab 01/03/23.    Impairment group code: Stroke Ischemic 01.2 (R) Body Involvement (L) Brain; Acute vs subacute left corona radiata stroke        1. PT, OT and SLP 60 minutes of each on a daily basis, in addition to rehab nursing and close management of physiatrist.       2. Impairment of ADL's: Noted to have impaired activity tolerance, impaired balance, impaired cognition, impaired coordination, impaired judgement and safety awareness, impaired strength, impaired tone, impaired weight shifting and impulsiveness, all affecting her ability to safely and independently perform basic ADLs.  Goal for mod I with basic ADLs.     3. Impairment of mobility:  Noted to have  impaired activity tolerance, impaired balance, impaired cognition, impaired coordination, impaired judgement and safety awareness, impaired strength, impaired tone, impaired weight shifting and impulsiveness, all affecting her ability to safely and independently perform basic mobility.  Goal for mod I with basic mobility.     4. Impairment of cognition/language/swallow:  Noted to have some possible cognitive deficits.  Would benefit from full cognitive-linguistic evaluation with SLP at ARU with goal for improved cognitive-linguistic skills to meet basic needs.     5. Medical Conditions  New actions/orders/updates for today are in blue.     Subacute left corona radiata stroke  Presented with with right hand incoordination, RUE weakness and possible shuffling gait on RLE which resolved at time of admission.    * MRI brain showed acute to subacute infarct in the left corona radiata.   * MRA neck - Patent arteries in the neck without evidence of dissection.  Apparent atherosclerotic disease of right carotid bifurcation and proximal internal carotid artery which appears to cause approximately 50% stenosis by NASCET criteria  * Loaded with plavix in ED, then started on ASA, Plavix.    * 12/31 AM: Recurrent symptoms of right upper ext ataxia, drift, weakness. Neurology contacted. Repeat CT/CTA head and neck CTA without changes (see report for detail).  * Per neuro, stroke likely due to intracranial atherosclerosis  * TTE-LVEF 65-70%, no thrombus seen in LV  * 1/1 - reporting more weakness in RUE. Neuro evaluated on 1/1 due to flunctuating symptoms, recommended to avoid hypotension, MRI brain w/o if sxs persist.  RUE weakness has been stable since per inpatient team.  - Continue aspirin 81 mg daily  - Continue Plavix 75 mg daily x90 days (started on 12/31)  - PTA Lipitor increased to 80 mg daily, LDL 96  - Outpatient BP <130/80.  Avoid hypotenstion.  Management as below.  - 30-day cardiac monitor at discharge per neurology.   Monitor in place at ARU admission.  - Stroke PLC completed while inpatient on 1/3  - Continue PT/OT/SLP  - Follow-up with [facility]     Hypertension  BP elevated, started amlodipine 5mg daily on 1/3.    - Per neurology, avoid rapid decrease in BP and needs gradual titration of medication for optimal BP control with long-term (OP) goal <130/80.  - Continue amlodipine 5 mg daily  - BP still slightly elevated, 130s-150s.  Given goal for gradual reduction, and amlodipine just started on 1/3, will continue to monitor but if trend continues may need to increase dose.  - Monitor BP     CAD status post three-vessel CABG  Aortic stenosis s/p aortic valve replacement  - Antiplatelet and statin therapy as above     Hyperlipidemia  PTA statin increased as above due to LDL 96.  - Continue atorvastatin 80 mg daily  - Goal LDL <70     CKD 3  Baseline Cr ~1.  Stable this admission 0.7-0.8.  - Avoid nephrotoxins as able  - Monitor BMP every M/Th.  Cr stable on 1/5 at 0.74     Hyperglycemia w/ prediabetes  Newly identified this admission, A1c 6%  - Goal A1c <7 for secondary stroke prevention  - Follow up with PCP for ongoing management/surveillance       6. Adjustment to disability:  Clinical psychology to eval and treat if indicated  7. FEN: regular diet, thin liquids  8. Bowel: continent, monitor, PRN bowel meds available  9. Bladder: continent, PVRs at admission with no evidence of retention  10. DVT Prophylaxis: mechanical  11. GI Prophylaxis: not indicated  12. Code: full, confirmed at admission  13. Disposition: goal for home  14. ELOS:  8 days  15. Follow up Appointments on Discharge: PCP in 1-2 weeks, stroke neurology ([facility]) in 6-8 weeks        Patient discussed with Dr. Edgard Bryant, PM&R Staff Physician    Joann Nowak PA-C  Physical Medicine & Rehabilitation

## 2023-01-06 NOTE — PROGRESS NOTES
Writer assisted patient to the bathroom this morning. Ambulates CGA with a walker. Patient continent of urine and was able to independently perform aviva cares and clothing management. Patient ate 100% of her breakfast and denied any difficulty with pain or discomfort. Writer obtained vital signs. /69. Writer administered morning medications, which included Norvasc. Writer updated bedside RN.

## 2023-01-06 NOTE — PROGRESS NOTES
Individualized Overall Plan Of Care (IOPOC)      Rehab diagnosis/Impairment Group Code: Stroke ischemic 01.2 (r) body involvement (l) brain; acute vs subacute left corona radiata stroke  Ischemic stroke (h)       Expected functional outcome: reach a level of mod I     Clinical Impression Comments: R hemiparesis post CVA     Mobility: Pt is below baseline for functional mobility s/p CVA resulting in R side weakness and incoordination. At this time, she requires walker for mobility. She lives alone and will need 10 days to achieve safety with mobility and IADLs.    ADL:  Goal is to reach a level of mod I     Communication/Cognition/Swallow: SLP: CLQT administered and interpreted. Pt overall composite score WNL; however, pt reported awreness of memory differences from PLOF and subjectively during test tasks pt demo'd increased difficulty with higher level reasoing/problem solving tasks, error correction. Pt score for executive function domain borderline WNL/Mild. Skilled SLP services indicated to train in cognitive strategies to promote functioning, independence upon return to home/community.     Intensity of therapy:   PT 60 minutes, Daily, for 10 days   OT 60 minutes, Daily, for 10 days   SLP 60 minutes, daily, for 10 days       Education stroke  Neuropsychology Testing: No      Medical Prognosis: good       Physician summary statement: R hemiparesis post CVA, goal is to reach a level of mod I    Discharge destination: prior home  Discharge rehabilitation needs: home care, PT, OT and SLP      Estimated length of stay: 10 days       Rehabilitation Physician Edgard Bryant DO

## 2023-01-06 NOTE — PLAN OF CARE
Discharge Planner Post-Acute Rehab SLP:     Discharge Plan: home with ongoing SLP    Precautions: None from SLP perspective    Current Status:  Hearing: WNL  Vision: Reading glasses  Communication: WNL  Cognition: Mild cognitive impairment; mild reasoning and min-mild memory  Swallow: Regular/thin (0); not evaluated on ARU, no concerns    Assessment: Instructed pt to complete moderately complex deductive reasoning puzzle #6, pt required min-mild cueing to complete this session, majority of cues related to moving on to next clue before all components of prior clue addressed/recorded. Pt educated in importance of slowing down when completing cognitive tasks, risk of missed details and inaccurate completion if trying to get it done as quickly as possible; better to go slower and do correctly than quickly and full of errors.    Other Barriers to Discharge (Family Training, etc): family training in strategies/support to increase independence.

## 2023-01-06 NOTE — PLAN OF CARE
Goal Outcome Evaluation:      Plan of Care Reviewed With: patient    Overall Patient Progress: no changeOverall Patient Progress: no change    Outcome Evaluation: Pt continuing with therapies. Advocates for own needs, calls appropriately. Motivated to make progress.    Orientation: Alert and oriented x4  Bowel: Continent of bowel. LBM 1/4  Bladder: Continent of bladder  Pain: Denies pain.  Ambulation/Transfers: Assist of 1 with walker  Diet/ Liquids: Regular/Thin  No acute medical concerns this shift

## 2023-01-06 NOTE — PLAN OF CARE
FOCUS/GOAL  Mobility, Skin integrity, Psychosocial needs, Safety management, and Prevention of secondary complications    ASSESSMENT, INTERVENTIONS AND CONTINUING PLAN FOR GOAL:    Goal Outcome Evaluation:        Pt Aox4 this shift. Pleasant and cooperative. No concerns for nurse at this time. Calls appropriately. Needs in reach and safety measures in place. Cont POC  Mobility: A1 FWW  Bowel/Bladder: cont. LBM 1/4  Skin: no new concerns  O2: RA  Diet: R/T/W  Psychosocial: appropriate to situation  Pain: denies  Tubes/Lines/Drains: none  Misc: pt requested and received wallet from security to give to her daughter to take home

## 2023-01-06 NOTE — PLAN OF CARE
Discharge Planner Post-Acute Rehab OT:     Discharge Plan: ILF with family A, OP OT    Precautions: falls, alarms    Current Status:  ADLs:    Mobility: Ax1 cane.     Grooming: IND with set up.    Dressing: UB - Min A bra. SBA top. LB - SBA. Footwear - SBA.    Bathing: Transfer SBA cane, grab bar, ETB. Bathing SBA with A for item mgmt, verbal cues.    Toileting: Transfer CGA cane. Cares CGA.  IADLs: IND PTA. Anticipate A at discharge.  Vision/Cognition: Glasses. Deficits in memory, R attention, inhibition, flexible thinking, and higher level EF noted functionally.    Assessment: Completed shower and ADL assesment. Functional cognition deficits in inhibition and flexible thinking noted. Pt SBA throughout, requiring verbal cues for consistent use of mobility device and completing familiar task in novel environment.     Per PT, family may not be able to organize 24/7 A initially reported at admission. Plan to monitor functional cognition and safety over weekend to inform recommended level of supervision at discharge and IADL A support prior to family training Monday.    Other Barriers to Discharge (DME, Family Training, etc): safe discharge plan - family unable to confirm 24/7 A initially reported at admission. Pt lives alone in ILF - Walk in shower, grab bars; standard toilet.    DME: Recommend shower seat.    Family training scheduled Monday 3-4pm with daughter Prachi.

## 2023-01-06 NOTE — PROGRESS NOTES
"Received a vm from pt son, Shaun Coats PH: [phone] asking for this writer to call back and discuss [facility] placement as he spoke with PT/OT about this.     SWer spoke with pt first and pt gave SWer permission to call Shaun back.     SWer called Shaun and spoke with him for 30 minutes. Shaun shared that he lives further away, understands that there is family training on Monday, and is wondering about TCU placement. Shaun shared that his wife works as a RN in TCU/LTC and they have had other family members in rehab and want pt to get more therapy compared to what's provided in-home. Discussed with Shaun that based on pt is doing, pt would not qualify for TCU and that therapy is recommending OP anyway. SW read some information from therapy progress notes to Shaun and Shaun stated that the information provided \"paints a different picture\" compared to the information passed along to him. Shaun asked about the recommendation for 24/7. Discussed hypothetical and common reasons why patients would have 24/7 supervision from ARU but shared that SW is unaware of the specific reasons as it relates to pt, and that therapy can discuss further at family training Monday. Shaun shared that there can be a lot of \"hand holding\" between his siblings and the pt and that he personally feels that \"pushing\" and allowing pt to be as indep as possible, is better for her overall wellbeing. Shaun shared that the family is looking for [facility] closer to [place] where pt three dtr's live. SWer shared some resources for locating senior care locations. Also discussed options for transportation home. Spoke about meal programs ([employer]'s Meals and MOW) and life alert options. Shaun shared that pt would not be agreeable to a life alert, SWer shared information about Apple Watch and fall detection and medication alarms.  Shaun appreciative of the information.     Shaun plans to be on the unit tomorrow visiting with pt. Shaun hopes to connect with therapy as he will " not be able to attend family training on Monday. Lori spoke with primary OT Mathieu who will be on the unit tomorrow, along with pt's primary PT and SLP and hand off provided.     No additional information. Shaun and pt's other dtr Marisol contact information added to face sheet. SW will remain available and continue to follow.     Anastasiya Oliver [facility]   Direct Phone: [phone]  I   Pager: [phone]  I  Fax: [phone]

## 2023-01-07 ENCOUNTER — APPOINTMENT (OUTPATIENT)
Dept: SPEECH THERAPY | Facility: CLINIC | Age: 83
DRG: 057 | End: 2023-01-07
Attending: PHYSICAL MEDICINE & REHABILITATION
Payer: COMMERCIAL

## 2023-01-07 ENCOUNTER — APPOINTMENT (OUTPATIENT)
Dept: PHYSICAL THERAPY | Facility: CLINIC | Age: 83
DRG: 057 | End: 2023-01-07
Attending: PHYSICAL MEDICINE & REHABILITATION
Payer: COMMERCIAL

## 2023-01-07 ENCOUNTER — APPOINTMENT (OUTPATIENT)
Dept: OCCUPATIONAL THERAPY | Facility: CLINIC | Age: 83
DRG: 057 | End: 2023-01-07
Attending: PHYSICAL MEDICINE & REHABILITATION
Payer: COMMERCIAL

## 2023-01-07 PROCEDURE — 99232 SBSQ HOSP IP/OBS MODERATE 35: CPT | Performed by: PHYSICAL MEDICINE & REHABILITATION

## 2023-01-07 PROCEDURE — 97130 THER IVNTJ EA ADDL 15 MIN: CPT | Mod: GN | Performed by: SPEECH-LANGUAGE PATHOLOGIST

## 2023-01-07 PROCEDURE — 250N000013 HC RX MED GY IP 250 OP 250 PS 637: Performed by: PHYSICIAN ASSISTANT

## 2023-01-07 PROCEDURE — 97129 THER IVNTJ 1ST 15 MIN: CPT | Mod: GN | Performed by: SPEECH-LANGUAGE PATHOLOGIST

## 2023-01-07 PROCEDURE — 97112 NEUROMUSCULAR REEDUCATION: CPT | Mod: GP

## 2023-01-07 PROCEDURE — 128N000003 HC R&B REHAB

## 2023-01-07 PROCEDURE — 97116 GAIT TRAINING THERAPY: CPT | Mod: GP

## 2023-01-07 PROCEDURE — 97535 SELF CARE MNGMENT TRAINING: CPT | Mod: GO

## 2023-01-07 RX ADMIN — ATORVASTATIN CALCIUM 80 MG: 80 TABLET, FILM COATED ORAL at 20:53

## 2023-01-07 RX ADMIN — AMLODIPINE BESYLATE 5 MG: 5 TABLET ORAL at 09:05

## 2023-01-07 RX ADMIN — ASPIRIN 81 MG CHEWABLE TABLET 81 MG: 81 TABLET CHEWABLE at 09:05

## 2023-01-07 RX ADMIN — Medication 50 MCG: at 09:05

## 2023-01-07 RX ADMIN — CLOPIDOGREL BISULFATE 75 MG: 75 TABLET, FILM COATED ORAL at 09:05

## 2023-01-07 RX ADMIN — Medication 1 CAPSULE: at 09:05

## 2023-01-07 ASSESSMENT — ACTIVITIES OF DAILY LIVING (ADL)
ADLS_ACUITY_SCORE: 37
ADLS_ACUITY_SCORE: 37
ADLS_ACUITY_SCORE: 35
ADLS_ACUITY_SCORE: 36
ADLS_ACUITY_SCORE: 36
ADLS_ACUITY_SCORE: 37
ADLS_ACUITY_SCORE: 34
ADLS_ACUITY_SCORE: 36
ADLS_ACUITY_SCORE: 35
ADLS_ACUITY_SCORE: 35
ADLS_ACUITY_SCORE: 34
ADLS_ACUITY_SCORE: 34

## 2023-01-07 NOTE — PLAN OF CARE
Goal Outcome Evaluation:      Plan of Care Reviewed With: patient    Outcome Evaluation: Pt's vitals stable. BP slightly elevated before BP meds given. Denied pain. C/o having diarrhea this morning saying she had 3 loose stolls and wanting an imodium. Dr Bryant notified but then she had a soft formed BM and she felt fine. She is able to appropriately using the call light for assist. CGA with walker. Continent of bowel and bladder.

## 2023-01-07 NOTE — PLAN OF CARE
FOCUS/GOAL  Medical management    ASSESSMENT, INTERVENTIONS AND CONTINUING PLAN FOR GOAL:  Pt is alert and oriented. No complaints of pain. CGA with walker. Continent of bowel and bladder using toilet in the bathroom. BM this shift. Appeared to be sleeping between cares.

## 2023-01-07 NOTE — PLAN OF CARE
Discharge Planner Post-Acute Rehab SLP:     Discharge Plan: home with ongoing SLP    Precautions: None from SLP perspective    Current Status:  Hearing: WNL  Vision: Reading glasses  Communication: WNL  Cognition: Mild cognitive impairment; mild reasoning and min-mild memory  Swallow: Regular/thin (0); not evaluated on ARU, no concerns    Assessment: Facilitated pt-centric medication task with 4x currently scheduled prescription medications, 1x PRN medication as foil. 2x pill boxes, one for morning and one for evening as pt reported utilizing both at [facility]. Pt demo'd putting doses correctly in a.m. box, 1x med doses correctly in evening box, correctly did not dispense PRN medication. Pt appeared to manage bottle and pills with little difficulty r/t right/dominant hand impairment.    Other Barriers to Discharge (Family Training, etc): family training in strategies/support to increase independence.

## 2023-01-07 NOTE — PROGRESS NOTES
"  [facility]   Acute Rehabilitation Unit  Daily progress note    INTERVAL HISTORY  Inga Coats was seen at bedside this morning.  Yesterday evening reports of loose stools.  Feels better this AM, but poor PO intake.  Denies chest pain, shortness of breath, no fever or chills.  Discussed slowly advancing diet and will continue to monitor.      Functional  OT:  ADLs:    Mobility: Ax1 cane.     Grooming: IND with set up.    Dressing: UB - Min A bra. SBA top. LB - SBA. Footwear - SBA.    Bathing: Transfer SBA cane, grab bar, ETB. Bathing SBA with A for item mgmt, verbal cues.    Toileting: Transfer CGA cane. Cares CGA.  IADLs: IND PTA. Anticipate A at discharge.  Vision/Cognition: Glasses. Deficits in memory, R attention, inhibition, flexible thinking, and higher level EF noted functionally.      ROS: 10 point ROS neg other than the symptoms noted above in the HPI.      MEDICATIONS    amLODIPine  5 mg Oral Daily     aspirin  81 mg Oral Daily     atorvastatin  80 mg Oral QPM     clopidogrel  75 mg Oral Daily     multivitamin  with lutein  1 capsule Oral Daily     vitamin D3  50 mcg Oral Daily        acetaminophen, polyethylene glycol, senna-docusate     PHYSICAL EXAM  BP (!) 142/55 (BP Location: Left arm)   Pulse 72   Temp 98.3  F (36.8  C) (Oral)   Resp 16   Ht 1.638 m (5' 4.5\")   Wt 63.5 kg (140 lb)   SpO2 97%   BMI 23.66 kg/m     Gen: NAD, seated upright in chair  HEENT: NC/AT, MMM  Cardio: RRR, no murmurs, cardiac monitor L upper chest  Pulm: non-labored on room air, lungs CTA bilaterally  Abd: soft, non-tender, non-distended, bowel sounds present  Ext: no appreciable edema in bilateral lower extremities  Neuro/MSK: AAO, speech clear/fluent, PERRL, EOMI, R-sided incoordination/ataxia    LABS  CBC RESULTS:   Recent Labs   Lab Test 01/05/23  1127 12/31/22  0710 12/30/22  1103   WBC 7.4 6.4 7.2   RBC 4.56 4.43 4.48   HGB 13.2 12.6 13.1   HCT 38.6 37.7 38.2   MCV 85 85 " 85   MCH 28.9 28.4 29.2   MCHC 34.2 33.4 34.3   RDW 13.6 14.2 14.2    288 292     Last Basic Metabolic Panel:  Recent Labs   Lab Test 01/05/23  1127 01/02/23  1048 12/31/22  1643 12/31/22  1242 12/31/22  0710    135  --   --  137   POTASSIUM 4.6 4.4  --   --  4.1   CHLORIDE 101 105  --   --  106   CO2 23 25  --   --  23   ANIONGAP 9 5  --   --  8   GLC 96 95 129*   < > 113*   BUN 22 17  --   --  18   CR 0.74 0.74  --   --  0.80   GFRESTIMATED 79 79  --   --  72   JAC 9.9 9.1  --   --  9.4    < > = values in this interval not displayed.       Rehabilitation - continue comprehensive acute inpatient rehabilitation program with multidisciplinary approach including therapies, rehab nursing, and physiatry following. See interval history for updates.      ASSESSMENT AND PLAN  Inga Coats is a 82 year old right hand dominant female with a past medical history of CAD s/p CABG x3, aortic stenosis s/p AVR, TIA, hyperlipidemia, and CKD 3 who was admitted on 12/30/22 with subacute left corona radiata stroke with hospital course complicated by elevated BP, new prediabetes diagnosis, and fluctuating stroke symptoms.  She is now admitted to ARU on 1/3/23 for multidisciplinary rehabilitation and ongoing medical management.        Admission to acute inpatient rehab 01/03/23.    Impairment group code: Stroke Ischemic 01.2 (R) Body Involvement (L) Brain; Acute vs subacute left corona radiata stroke        1. PT, OT and SLP 60 minutes of each on a daily basis, in addition to rehab nursing and close management of physiatrist.       2. Impairment of ADL's: Noted to have impaired activity tolerance, impaired balance, impaired cognition, impaired coordination, impaired judgement and safety awareness, impaired strength, impaired tone, impaired weight shifting and impulsiveness, all affecting her ability to safely and independently perform basic ADLs.  Goal for mod I with basic ADLs.     3. Impairment of mobility:  Noted  to have impaired activity tolerance, impaired balance, impaired cognition, impaired coordination, impaired judgement and safety awareness, impaired strength, impaired tone, impaired weight shifting and impulsiveness, all affecting her ability to safely and independently perform basic mobility.  Goal for mod I with basic mobility.     4. Impairment of cognition/language/swallow:  Noted to have some possible cognitive deficits.  Would benefit from full cognitive-linguistic evaluation with SLP at ARU with goal for improved cognitive-linguistic skills to meet basic needs.     5. Medical Conditions  New actions/orders/updates for today are in blue.     Subacute left corona radiata stroke  Presented with with right hand incoordination, RUE weakness and possible shuffling gait on RLE which resolved at time of admission.    * MRI brain showed acute to subacute infarct in the left corona radiata.   * MRA neck - Patent arteries in the neck without evidence of dissection.  Apparent atherosclerotic disease of right carotid bifurcation and proximal internal carotid artery which appears to cause approximately 50% stenosis by NASCET criteria  * Loaded with plavix in ED, then started on ASA, Plavix.    * 12/31 AM: Recurrent symptoms of right upper ext ataxia, drift, weakness. Neurology contacted. Repeat CT/CTA head and neck CTA without changes (see report for detail).  * Per neuro, stroke likely due to intracranial atherosclerosis  * TTE-LVEF 65-70%, no thrombus seen in LV  * 1/1 - reporting more weakness in RUE. Neuro evaluated on 1/1 due to flunctuating symptoms, recommended to avoid hypotension, MRI brain w/o if sxs persist.  RUE weakness has been stable since per inpatient team.  - Continue aspirin 81 mg daily  - Continue Plavix 75 mg daily x90 days (started on 12/31)  - PTA Lipitor increased to 80 mg daily, LDL 96  - Outpatient BP <130/80.  Avoid hypotenstion.  Management as below.  - 30-day cardiac monitor at discharge per  neurology.  Monitor in place at ARU admission.  - Stroke PLC completed while inpatient on 1/3  - Continue PT/OT/SLP  - Follow-up with [facility]     Hypertension  BP elevated, started amlodipine 5mg daily on 1/3.    - Per neurology, avoid rapid decrease in BP and needs gradual titration of medication for optimal BP control with long-term (OP) goal <130/80.  - Continue amlodipine 5 mg daily  - BP still slightly elevated, 130s-150s.  Given goal for gradual reduction, and amlodipine just started on 1/3, will continue to monitor but if trend continues may need to increase dose.  - Monitor BP     CAD status post three-vessel CABG  Aortic stenosis s/p aortic valve replacement  - Antiplatelet and statin therapy as above     Hyperlipidemia  PTA statin increased as above due to LDL 96.  - Continue atorvastatin 80 mg daily  - Goal LDL <70     CKD 3  Baseline Cr ~1.  Stable this admission 0.7-0.8.  - Avoid nephrotoxins as able  - Monitor BMP every M/Th.  Cr stable on 1/5 at 0.74     Hyperglycemia w/ prediabetes  Newly identified this admission, A1c 6%  - Goal A1c <7 for secondary stroke prevention  - Follow up with PCP for ongoing management/surveillance       6. Adjustment to disability:  Clinical psychology to eval and treat if indicated  7. FEN: regular diet, thin liquids  8. Bowel: continent, monitor, PRN bowel meds available  9. Bladder: continent, PVRs at admission with no evidence of retention  10. DVT Prophylaxis: mechanical  11. GI Prophylaxis: not indicated  12. Code: full, confirmed at admission  13. Disposition: goal for home  14. ELOS:  8 days  15. Follow up Appointments on Discharge: PCP in 1-2 weeks, stroke neurology ([facility]) in 6-8 weeks            Edgard Bryant, DO  Physical Medicine & Rehabilitation         I spent a total of 25 minutes face to face and coordinating care of Inga Coats. Over 50% of my time on the unit was spent counseling the  patient and /or coordinating care regarding post CVA.

## 2023-01-07 NOTE — PLAN OF CARE
Discharge Planner Post-Acute Rehab OT:     Discharge Plan: ILF with family A, OP OT    Precautions: falls, alarms    Current Status:  ADLs:    Mobility: Ax1 cane.     Grooming: IND with set up.    Dressing: UB - Min A bra. SBA top. LB - SBA. Footwear - SBA.    Bathing: Transfer SBA cane, grab bar, ETB. Bathing SBA with A for item mgmt, verbal cues.    Toileting: Transfer CGA cane. Cares CGA.  IADLs: IND PTA. Anticipate A at discharge. Stovetop meal prep SBA with cane, vc 1x safety.  Vision/Cognition: Glasses. Deficits in memory, R attention, inhibition, flexible thinking, and higher level EF noted functionally.    Assessment: Safety cue required during stovetop meal prep task; improvement in item organization prior to task with prompt from previous session. Benefits from education on when challenge RUE in functional tasks vs prioritize safety and use LUE/balance modifications with higher-risk tasks. Pt performance challenged by deviations from familiar environment.     Other Barriers to Discharge (DME, Family Training, etc): safe discharge plan - family unable to confirm 24/7 A initially reported at admission. Pt lives alone in ILF - Walk in shower, grab bars; standard toilet.    DME: Recommend shower seat.    Family training scheduled Monday 3-4pm with daughter Prachi.

## 2023-01-07 NOTE — PLAN OF CARE
Discharge Planner Post-Acute Rehab PT:     Discharge Plan: Home with OP PT, Family to assist with driving    Precautions: Falls, alarms    Current Status:  Bed Mobility: MOD I  Transfer: SBA with cane  Gait: SBA with cane  Stairs:CGAwith B rails  Balance: difficulty with motor-motor and cog-motor dual tasking  RUBIO/56 on 23    Assessment: Extensive gait training performed today. Still with R toe drag, but never causing LOB or fall. Pt has baseline bony alignment deformity that is contributing to poor gait mechanics in setting of new R side weakness.    Other Barriers to Discharge (DME, Family Training, etc):   Family assist for driving  Family supervision to ensure safety

## 2023-01-07 NOTE — PLAN OF CARE
FOCUS/GOAL  Mobility, Caregiver training, Carryover of rehab techniques, and Psychosocial needs    ASSESSMENT, INTERVENTIONS AND CONTINUING PLAN FOR GOAL:    Goal Outcome Evaluation:           Pt Aox4 this shift. Pleasant and cooperative. No concerns for nurse at this time. Calls appropriately. Needs in reach and safety measures in place. Cont POC  Mobility: A1 FWW  Bowel/Bladder: cont. LBM 1/4  Skin: no new concerns  O2: RA  Diet: R/T/W  Psychosocial: appropriate to situation  Pain: denies  Tubes/Lines/Drains: none  Misc: Begin MAP

## 2023-01-08 ENCOUNTER — APPOINTMENT (OUTPATIENT)
Dept: SPEECH THERAPY | Facility: CLINIC | Age: 83
DRG: 057 | End: 2023-01-08
Attending: PHYSICAL MEDICINE & REHABILITATION
Payer: COMMERCIAL

## 2023-01-08 ENCOUNTER — APPOINTMENT (OUTPATIENT)
Dept: PHYSICAL THERAPY | Facility: CLINIC | Age: 83
DRG: 057 | End: 2023-01-08
Attending: PHYSICAL MEDICINE & REHABILITATION
Payer: COMMERCIAL

## 2023-01-08 ENCOUNTER — APPOINTMENT (OUTPATIENT)
Dept: OCCUPATIONAL THERAPY | Facility: CLINIC | Age: 83
DRG: 057 | End: 2023-01-08
Attending: PHYSICAL MEDICINE & REHABILITATION
Payer: COMMERCIAL

## 2023-01-08 PROCEDURE — 97130 THER IVNTJ EA ADDL 15 MIN: CPT | Mod: GN | Performed by: SPEECH-LANGUAGE PATHOLOGIST

## 2023-01-08 PROCEDURE — 97530 THERAPEUTIC ACTIVITIES: CPT | Mod: GP

## 2023-01-08 PROCEDURE — 128N000003 HC R&B REHAB

## 2023-01-08 PROCEDURE — 97129 THER IVNTJ 1ST 15 MIN: CPT | Mod: GN | Performed by: SPEECH-LANGUAGE PATHOLOGIST

## 2023-01-08 PROCEDURE — 250N000013 HC RX MED GY IP 250 OP 250 PS 637: Performed by: PHYSICIAN ASSISTANT

## 2023-01-08 PROCEDURE — 97535 SELF CARE MNGMENT TRAINING: CPT | Mod: GO

## 2023-01-08 PROCEDURE — 97750 PHYSICAL PERFORMANCE TEST: CPT | Mod: GP

## 2023-01-08 RX ADMIN — CLOPIDOGREL BISULFATE 75 MG: 75 TABLET, FILM COATED ORAL at 08:17

## 2023-01-08 RX ADMIN — ASPIRIN 81 MG CHEWABLE TABLET 81 MG: 81 TABLET CHEWABLE at 08:17

## 2023-01-08 RX ADMIN — Medication 1 CAPSULE: at 08:17

## 2023-01-08 RX ADMIN — ATORVASTATIN CALCIUM 80 MG: 80 TABLET, FILM COATED ORAL at 19:18

## 2023-01-08 RX ADMIN — AMLODIPINE BESYLATE 5 MG: 5 TABLET ORAL at 08:17

## 2023-01-08 RX ADMIN — Medication 50 MCG: at 08:17

## 2023-01-08 ASSESSMENT — ACTIVITIES OF DAILY LIVING (ADL)
ADLS_ACUITY_SCORE: 34

## 2023-01-08 NOTE — PLAN OF CARE
Discharge Planner Post-Acute Rehab SLP:     Discharge Plan: home with ongoing SLP    Precautions: None from SLP perspective    Current Status:  Hearing: WNL  Vision: Reading glasses  Communication: WNL  Cognition: Mild cognitive impairment; mild reasoning and min-mild memory  Swallow: Regular/thin (0); not evaluated on ARU, no concerns    Assessment: Instructed pt in FAVRES task #1/high complexity task, making a decision. Pt determined best activity given parameters (time, age, budget) with minimal cueing from SLP for underlying important information needed to make decision.    P.M.: Instructed pt in ALANNAH language based daily living tasks. Pt completed money counting with 100% accuracy with 1x self correction. Pt compelted daily math with 90% accuracy, cues did  not increase time calculation accuracy. Pt completed check writing task 100%. Med label reading task completed with 90% independent accuracy, required cue for correct time placement of 1x med, family arrived when pt completing that component and she rushed through to greet them. Pt completed reading instruction task with 100% accuracy with 1x self correction.    Other Barriers to Discharge (Family Training, etc): family training in strategies/support to increase independence.

## 2023-01-08 NOTE — PLAN OF CARE
Goal Outcome Evaluation:    Overall Patient Progress: improvingOverall Patient Progress: improving    Patient is alert and oriented x4. Calls appropriately for needs and assistance. A1 with the walker in transfers. Continent of bowel and bladder. Had a small bm this shift. Uses the bathroom. Denie dpain, sob, dizziness, fever or any new weakness. To start on MAP today. Will continue poc.

## 2023-01-08 NOTE — PROGRESS NOTES
01/08/23 1400   Signing Clinician's Name / Credentials   Signing clinician's name / credentials Charmaine Gabriel DPREYNALDO   Rubio Balance Scale (RAMIRO LLAMAS, JUDE S, MATTIE VIEIRA, EDWARD LONDON: MEASURING BALANCE IN THE ELDERLY: VALIDATION OF AN INSTRUMENT. CAN. J. PUB. HEALTH, JULY/AUGUST SUPPLEMENT 2:S7-11, 1992.)   Sit To Stand 4   Standing Unsupported 4   Sitting Unsupported 4   Stand to Sit 4   Transfers 4   Standing with Eyes Closed 4   Standing Unsupported, Feet Together 4   Reach Forward With Outstretched Arm 3   Retrieve Object From Floor 4   Turning to Look Behind 3   Turn 360 Degrees 3   Placing Alternate Foot on Stool (4-6 inches) 1   Unsupported Tandem Stand (Demonstrate to Subject) 2   One Leg Stand 2   Total Score (A score of 45 or less has been correlated with an increased risk of falls)   Total Score (out of 56) 46     Rubio Balance Scale (BBS) Cutoff Scores for CVA Population:    The BBS is a measure of static and dynamic standing balance that has been validated in community dwelling elderly individuals and individuals who have Parkinson's Disease, MS, and those who are s/p CVA and TBI. The test is administered without an assistive device. Scores from the Rubio are used to determine the probability of falling based on the patient's previous history of falls and their test performance.     0-20 High risk for falling- Corresponded with w/c bound status  21-40 Medium risk for falling- Able to walk with assistance  41-56 Low risk for falling- Able to walk independently  According to [facility].  Available at http://www.strokecenter.org/.  Accessibility verified April 10, 2013.  Minimal Detectable Change = 6.5 according to Jaswinder & Seney 2008    Assessment (rationale for performing, application to patient s function & care plan): Pt has made significant improvement since admission RUBIO on 1/4/23 (scoring 32/56). She is now at low risk for falling, but should continue to utilize cane for balance  and increased feedback. At this time, she has ongoing balance and strength deficits on R side, but is functionally safe for ambulation in the home environment. Recommending initial supervision in the community d/t increased risk for falls in icy weather and open/distracting environments.     (Minutes billed as physical performance test)

## 2023-01-08 NOTE — PLAN OF CARE
Discharge Planner Post-Acute Rehab PT:     Discharge Plan: Home with OP PT, Family to assist with driving    Precautions: Falls, alarms    Current Status:  Bed Mobility: MOD I  Transfer: SBA with cane  Gait: SBA with cane  Stairs:CGA with single rail  Balance: difficulty with motor-motor and cog-motor dual tasking  RUBIO/56 on 23  46/56 on 23  Gait Speed: 0.79 m/sec    Assessment: Close to MOD I in room, but continues to demo poor safety awareness at times. F.T. tomorrow in PM.    Other Barriers to Discharge (DME, Family Training, etc):   Family assist for driving  Initial supervision to ensure safety

## 2023-01-08 NOTE — PLAN OF CARE
Goal Outcome Evaluation:  ASSESSMENT, INTERVENTIONS AND CONTINUING PLAN FOR GOAL:     Goal Outcome Evaluation:     Pt Aox4 this shift. Pleasant and cooperative with cares and therapy. No concerns for nurse at this time. Uses call light for assistance. Needs in reach and safety measures in place. Pt. stated having loose BM,denies abdominal pain.  V/S: are all WDL   Mobility: A1 with Walker/Wheelchair,gait belt  Bowel/Bladder: continent. urine output adequate, LBM 1/8/23  Skin: no new concerns  O2: RA  Diet: Regular, Thin  Psychosocial: appropriate to situation  Pain: Denies pain and discomfort  Tubes/Lines/Drains: none  Misc: Begin MAP, able to identify scheduled medication.

## 2023-01-08 NOTE — PLAN OF CARE
Discharge Planner Post-Acute Rehab OT:     Discharge Plan: ILF with HCOT, family assist driving and heavy IADLs. Recommend initial family assist in home to assist transition < 1 week.    Precautions: falls, alarms    Current Status:  ADLs:    Mobility: Ax1 cane.     Grooming: IND with set up.    Dressing: UB - Min A bra. SBA top. LB - SBA. Footwear - SBA.    Bathing: Transfer SBA cane, grab bar, ETB. Bathing SBA with A for item mgmt, verbal cues.    Toileting: Transfer CGA cane. Cares CGA.  IADLs: IND PTA, with family responsible financial mgmt. Anticipate A at discharge. Stovetop meal prep SBA with cane, vc 1x safety. Initiated MAP 1/8. SBA pet mgmt and laundry simulation.  Vision/Cognition: Glasses. Deficits in memory, R attention, inhibition, flexible thinking, and higher level EF noted functionally.     Assessment: Improved consistency with cane and balance strategies simulating IADL tasks. Continues to require education on therapeutic benefit of challenge vs fall prevention strategies to aide overall functional recovery goals.     Other Barriers to Discharge (DME, Family Training, etc): safe discharge plan - family unable to confirm 24/7 A initially reported at admission. Pt lives alone in ILF - Walk in shower, grab bars; standard toilet.    DME: Recommend shower seat.    Family training scheduled Monday 3-4pm with daughter Prachi.

## 2023-01-09 ENCOUNTER — APPOINTMENT (OUTPATIENT)
Dept: SPEECH THERAPY | Facility: CLINIC | Age: 83
DRG: 057 | End: 2023-01-09
Attending: PHYSICAL MEDICINE & REHABILITATION
Payer: COMMERCIAL

## 2023-01-09 ENCOUNTER — APPOINTMENT (OUTPATIENT)
Dept: OCCUPATIONAL THERAPY | Facility: CLINIC | Age: 83
DRG: 057 | End: 2023-01-09
Attending: PHYSICAL MEDICINE & REHABILITATION
Payer: COMMERCIAL

## 2023-01-09 ENCOUNTER — APPOINTMENT (OUTPATIENT)
Dept: PHYSICAL THERAPY | Facility: CLINIC | Age: 83
DRG: 057 | End: 2023-01-09
Attending: PHYSICAL MEDICINE & REHABILITATION
Payer: COMMERCIAL

## 2023-01-09 LAB
ANION GAP SERPL CALCULATED.3IONS-SCNC: 3 MMOL/L (ref 3–14)
BUN SERPL-MCNC: 15 MG/DL (ref 7–30)
CALCIUM SERPL-MCNC: 9.5 MG/DL (ref 8.5–10.1)
CHLORIDE BLD-SCNC: 106 MMOL/L (ref 94–109)
CO2 SERPL-SCNC: 28 MMOL/L (ref 20–32)
CREAT SERPL-MCNC: 0.8 MG/DL (ref 0.52–1.04)
ERYTHROCYTE [DISTWIDTH] IN BLOOD BY AUTOMATED COUNT: 13.5 % (ref 10–15)
GFR SERPL CREATININE-BSD FRML MDRD: 72 ML/MIN/1.73M2
GLUCOSE BLD-MCNC: 99 MG/DL (ref 70–99)
HCT VFR BLD AUTO: 39 % (ref 35–47)
HGB BLD-MCNC: 12.9 G/DL (ref 11.7–15.7)
MCH RBC QN AUTO: 28.7 PG (ref 26.5–33)
MCHC RBC AUTO-ENTMCNC: 33.1 G/DL (ref 31.5–36.5)
MCV RBC AUTO: 87 FL (ref 78–100)
PLATELET # BLD AUTO: 333 10E3/UL (ref 150–450)
POTASSIUM BLD-SCNC: 4.2 MMOL/L (ref 3.4–5.3)
RBC # BLD AUTO: 4.49 10E6/UL (ref 3.8–5.2)
SODIUM SERPL-SCNC: 137 MMOL/L (ref 133–144)
WBC # BLD AUTO: 5.8 10E3/UL (ref 4–11)

## 2023-01-09 PROCEDURE — 97530 THERAPEUTIC ACTIVITIES: CPT | Mod: GP

## 2023-01-09 PROCEDURE — 92507 TX SP LANG VOICE COMM INDIV: CPT | Mod: GN | Performed by: SPEECH-LANGUAGE PATHOLOGIST

## 2023-01-09 PROCEDURE — 36415 COLL VENOUS BLD VENIPUNCTURE: CPT | Performed by: PHYSICIAN ASSISTANT

## 2023-01-09 PROCEDURE — 128N000003 HC R&B REHAB

## 2023-01-09 PROCEDURE — 97130 THER IVNTJ EA ADDL 15 MIN: CPT | Mod: GN

## 2023-01-09 PROCEDURE — 97129 THER IVNTJ 1ST 15 MIN: CPT | Mod: GN

## 2023-01-09 PROCEDURE — 85027 COMPLETE CBC AUTOMATED: CPT | Performed by: PHYSICIAN ASSISTANT

## 2023-01-09 PROCEDURE — 82310 ASSAY OF CALCIUM: CPT | Performed by: PHYSICIAN ASSISTANT

## 2023-01-09 PROCEDURE — 97116 GAIT TRAINING THERAPY: CPT | Mod: GP

## 2023-01-09 PROCEDURE — 97535 SELF CARE MNGMENT TRAINING: CPT | Mod: GO

## 2023-01-09 PROCEDURE — 99232 SBSQ HOSP IP/OBS MODERATE 35: CPT | Mod: FS | Performed by: PHYSICIAN ASSISTANT

## 2023-01-09 PROCEDURE — 250N000013 HC RX MED GY IP 250 OP 250 PS 637: Performed by: PHYSICIAN ASSISTANT

## 2023-01-09 RX ADMIN — Medication 1 CAPSULE: at 08:09

## 2023-01-09 RX ADMIN — ATORVASTATIN CALCIUM 80 MG: 80 TABLET, FILM COATED ORAL at 20:45

## 2023-01-09 RX ADMIN — ASPIRIN 81 MG CHEWABLE TABLET 81 MG: 81 TABLET CHEWABLE at 08:09

## 2023-01-09 RX ADMIN — CLOPIDOGREL BISULFATE 75 MG: 75 TABLET, FILM COATED ORAL at 08:10

## 2023-01-09 RX ADMIN — AMLODIPINE BESYLATE 5 MG: 5 TABLET ORAL at 08:10

## 2023-01-09 RX ADMIN — Medication 50 MCG: at 08:10

## 2023-01-09 ASSESSMENT — ACTIVITIES OF DAILY LIVING (ADL)
ADLS_ACUITY_SCORE: 34

## 2023-01-09 NOTE — PLAN OF CARE
Discharge Planner Post-Acute Rehab SLP:     Discharge Plan: home with ongoing SLP    Precautions: None from SLP perspective    Current Status:  Hearing: WNL  Vision: Reading glasses  Communication: WNL  Cognition: Mild cognitive impairment; mild reasoning and min-mild memory  Swallow: Regular/thin (0); not evaluated on ARU, no concerns    Assessment: Patient engaged in new learning task of Sudoku. Patient was able to learn the task as expected given new introduction to the task. Patient dismissive at times of her performance in the task.    Other Barriers to Discharge (Family Training, etc): family training in strategies/support to increase independence.

## 2023-01-09 NOTE — PROGRESS NOTES
"  [facility]   Acute Rehabilitation Unit  Daily progress note    INTERVAL HISTORY  Weekend and therapy notes reviewed, no acute events reported.  Was noted to have some loose stools on Friday evening and Saturday morning but since resolved.    Inga Coats was seen at bedside this morning.  She reports that overall things are going well.  She remains frustrated and feels her recovery going \"too slowly\" but at the same time does seem to acknowledge this is probably typical for stroke recovery and notes progress.  She is eager for family training with therapies this afternoon to discuss needed supports at discharge and help plan for hopeful discharge later this week.  She continues to deny other physical complaints, no pain, palpitations, shortness of breath, dizziness, nausea.  Had BM this morning, no recurrent loose stools.  She is working on [employer], which she finds challenging, new task for her.    Functionally, she is currently needing mod I for bed mobility, SBA for transfers and ambulation with cane.  Scored 46/56 on RUBIO (improved from 32 on prior).  PT noting patient is close to mod I (as of yesterday) but some ongoing poor safety awareness at times.  Family training scheduled for this afternoon.      MEDICATIONS    - Medication Assessment Program - Rehab Services   Does not apply See Admin Instructions     amLODIPine  5 mg Oral Daily     aspirin  81 mg Oral Daily     atorvastatin  80 mg Oral QPM     clopidogrel  75 mg Oral Daily     multivitamin  with lutein  1 capsule Oral Daily     vitamin D3  50 mcg Oral Daily        acetaminophen, polyethylene glycol, senna-docusate     PHYSICAL EXAM  /61 (BP Location: Right arm, Patient Position: Chair, Cuff Size: Adult Regular)   Pulse 81   Temp (!) 96.4  F (35.8  C) (Oral)   Resp 17   Ht 1.638 m (5' 4.5\")   Wt 63.5 kg (140 lb)   SpO2 98%   BMI 23.66 kg/m     Gen: NAD, seated upright in chair  HEENT: NC/AT, " MMM  Cardio: RRR, no murmurs, cardiac monitor L upper chest  Pulm: non-labored on room air, lungs CTA bilaterally  Abd: soft, non-tender, non-distended, bowel sounds present  Ext: no appreciable edema in bilateral lower extremities  Neuro/MSK: AAO, speech clear/fluent, PERRL, EOMI, R-sided incoordination/ataxia    LABS  CBC RESULTS:   Recent Labs   Lab Test 01/09/23  0551 01/05/23  1127 12/31/22  0710   WBC 5.8 7.4 6.4   RBC 4.49 4.56 4.43   HGB 12.9 13.2 12.6   HCT 39.0 38.6 37.7   MCV 87 85 85   MCH 28.7 28.9 28.4   MCHC 33.1 34.2 33.4   RDW 13.5 13.6 14.2    334 288     Last Basic Metabolic Panel:  Recent Labs   Lab Test 01/09/23  0551 01/05/23  1127 01/02/23  1048    133 135   POTASSIUM 4.2 4.6 4.4   CHLORIDE 106 101 105   CO2 28 23 25   ANIONGAP 3 9 5   GLC 99 96 95   BUN 15 22 17   CR 0.80 0.74 0.74   GFRESTIMATED 72 79 79   JAC 9.5 9.9 9.1       Rehabilitation - continue comprehensive acute inpatient rehabilitation program with multidisciplinary approach including therapies, rehab nursing, and physiatry following. See interval history for updates.      ASSESSMENT AND PLAN  Inga Coats is a 82 year old right hand dominant female with a past medical history of CAD s/p CABG x3, aortic stenosis s/p AVR, TIA, hyperlipidemia, and CKD 3 who was admitted on 12/30/22 with subacute left corona radiata stroke with hospital course complicated by elevated BP, new prediabetes diagnosis, and fluctuating stroke symptoms.  She is now admitted to ARU on 1/3/23 for multidisciplinary rehabilitation and ongoing medical management.        Admission to acute inpatient rehab 01/03/23.    Impairment group code: Stroke Ischemic 01.2 (R) Body Involvement (L) Brain; Acute vs subacute left corona radiata stroke        1. PT, OT and SLP 60 minutes of each on a daily basis, in addition to rehab nursing and close management of physiatrist.       2. Impairment of ADL's: Noted to have impaired activity tolerance,  impaired balance, impaired cognition, impaired coordination, impaired judgement and safety awareness, impaired strength, impaired tone, impaired weight shifting and impulsiveness, all affecting her ability to safely and independently perform basic ADLs.  Goal for mod I with basic ADLs.     3. Impairment of mobility:  Noted to have impaired activity tolerance, impaired balance, impaired cognition, impaired coordination, impaired judgement and safety awareness, impaired strength, impaired tone, impaired weight shifting and impulsiveness, all affecting her ability to safely and independently perform basic mobility.  Goal for mod I with basic mobility.     4. Impairment of cognition/language/swallow:  Noted to have some possible cognitive deficits.  Would benefit from full cognitive-linguistic evaluation with SLP at ARU with goal for improved cognitive-linguistic skills to meet basic needs.     5. Medical Conditions  New actions/orders/updates for today are in blue.     Subacute left corona radiata stroke  Presented with with right hand incoordination, RUE weakness and possible shuffling gait on RLE which resolved at time of admission.    * MRI brain showed acute to subacute infarct in the left corona radiata.   * MRA neck - Patent arteries in the neck without evidence of dissection.  Apparent atherosclerotic disease of right carotid bifurcation and proximal internal carotid artery which appears to cause approximately 50% stenosis by NASCET criteria  * Loaded with plavix in ED, then started on ASA, Plavix.    * 12/31 AM: Recurrent symptoms of right upper ext ataxia, drift, weakness. Neurology contacted. Repeat CT/CTA head and neck CTA without changes (see report for detail).  * Per neuro, stroke likely due to intracranial atherosclerosis  * TTE-LVEF 65-70%, no thrombus seen in LV  * 1/1 - reporting more weakness in RUE. Neuro evaluated on 1/1 due to flunctuating symptoms, recommended to avoid hypotension, MRI brain w/o  if sxs persist.  RUE weakness has been stable since per inpatient team.  - Continue aspirin 81 mg daily  - Continue Plavix 75 mg daily x90 days (started on 12/31)  - PTA Lipitor increased to 80 mg daily, LDL 96  - Outpatient BP <130/80.  Avoid hypotenstion.  Management as below.  - 30-day cardiac monitor at discharge per neurology.  Monitor in place at ARU admission.  - Stroke PLC completed while inpatient on 1/3  - Continue PT/OT/SLP  - Follow-up with [facility]     Hypertension  BP elevated, started amlodipine 5mg daily on 1/3.    - Per neurology, avoid rapid decrease in BP and needs gradual titration of medication for optimal BP control with long-term (OP) goal <130/80.  - Continue amlodipine 5 mg daily  - BP slightly elevated, 130s-150s.  Continue to monitor but if trend continues may need to increase dose.  - Monitor BP     CAD status post three-vessel CABG  Aortic stenosis s/p aortic valve replacement  - Antiplatelet and statin therapy as above     Hyperlipidemia  PTA statin increased as above due to LDL 96.  - Continue atorvastatin 80 mg daily  - Goal LDL <70     CKD 3  Baseline Cr ~1.  Stable this admission 0.7-0.8.  - Avoid nephrotoxins as able  - Monitor BMP every M/Th.  Cr stable on 1/9 at 0.8     Hyperglycemia w/ prediabetes  Newly identified this admission, A1c 6%  - Goal A1c <7 for secondary stroke prevention  - Follow up with PCP for ongoing management/surveillance       6. Adjustment to disability:  Clinical psychology to eval and treat if indicated  7. FEN: regular diet, thin liquids  8. Bowel: continent, monitor, PRN bowel meds available  9. Bladder: continent, PVRs at admission with no evidence of retention  10. DVT Prophylaxis: mechanical  11. GI Prophylaxis: not indicated  12. Code: full, confirmed at admission  13. Disposition: goal for home  14. ELOS:  1/12/23  15. Follow up Appointments on Discharge: PCP in 1-2 weeks, stroke neurology ([facility]) in  6-8 weeks      Patient discussed with Dr. Edgard Bryant, PM&R staff physician     Joann Nowak PA-C  Physical Medicine & Rehabilitation

## 2023-01-09 NOTE — PLAN OF CARE
Discharge Planner Post-Acute Rehab OT:     Discharge Plan: ILF with HCOT, family assist driving and heavy IADLs.   Recommend initial family assist in home to assist transition < 1 week.    Precautions: falls, alarms    Current Status:  ADLs:  Mobility: Ax1 cane.   Grooming: IND with set up.  Dressing: UB - Min A bra. SBA top. LB - SBA. Footwear - SBA.  Bathing: Transfer SBA cane, grab bar, ETB. Bathing SBA with A for item mgmt, verbal cues.  Toileting: Transfer CGA cane. Cares CGA.  IADLs: IND PTA, with family responsible financial mgmt. Recommend A with transportation, heavy/community-based ADLs, and med set up at discharge. Stovetop meal prep SBA with cane, vc 1x safety. Initiated MAP 1/8. SBA pet mgmt and laundry simulation.  Vision/Cognition: Glasses. Deficits in memory, R attention, inhibition, flexible thinking, and higher level EF noted functionally.     Assessment:  Completed family training. Plan for HC therapies d/t high IADL PLOF. Anticipate will extend discharge date to Friday or Saturday once family able to confirm level of initial supervision available.     Other Barriers to Discharge (DME, Family Training, etc): safe discharge plan - family unable to confirm 24/7 A initially reported at admission. Pt lives alone in ILF - Walk in shower, grab bars; standard toilet.    DME: Recommend shower seat, hurry cane, foot up.    Family training completed Monday 3-4pm with daughter Prachi, son Shaun, and SIAEL Ruiz.

## 2023-01-09 NOTE — PLAN OF CARE
Discharge Planner Post-Acute Rehab SLP:     Discharge Plan: home with ongoing SLP    Precautions: None from SLP perspective    Current Status:  Hearing: WNL  Vision: Reading glasses  Communication: WNL  Cognition: Mild cognitive impairment; mild reasoning and min-mild memory  Swallow: Regular/thin (0); not evaluated on ARU, no concerns    Assessment: Pt seen with daughter Marisol at bedside, son Shaun and daughter in law Sara also present via phone, for family training/discharge planning. Pt and family educated in recommendation for pt to complete med set up with pill boxes as previously, family to supervise set up first couple of opportunities, spot check doses taken. Pt and family educated based on information received from nursing and MAP, alarm reminders for medication doses recommended; pt reported she does not need to use alarms. SLP acknowledged pt statement, pt educated these are recommendations and she has the right to decline them. Pt and family educated in recommendation for 24 hours supervision for first few days upon return to apartment to ensure routine, basic set up of apartment adequate. Recommend OP ST, intially to receive HH for PT/OT upon return to community.    Other Barriers to Discharge (Family Training, etc): family training in strategies/support to increase independence. - completed 01/09/2023

## 2023-01-09 NOTE — PLAN OF CARE
Goal Outcome Evaluation:    Overall Patient Progress: improvingOverall Patient Progress: improving    Patient is alert and oriented x4. Make needs known. Min A1 with the walker in transfers. Continent oc B&B. No bm this shift. Slept well. No concerns overnight. Will continue poc.

## 2023-01-09 NOTE — PROGRESS NOTES
Per Mathieu with OT, plan for discharge home Friday 01/13/23 vs Sat 01/14/23, Mathieu will confirm with pt and pt family tomorrow. Caregiver training completed. Recommendation for HC. Pt in agreement. Referral for RN, PT, OT, SLP, and HA sent to [facility] and pending. Will remain available and continue to follow.     ADDENDUM: [facility] declined. Referral sent to [facility] and pending.     Anastasiya Oliver [facility] Acute Rehab   Direct Phone: [phone]  I   Pager: [phone]  I  Fax: [phone]

## 2023-01-09 NOTE — PROGRESS NOTES
Goal Outcome Evaluation:  ASSESSMENT, INTERVENTIONS AND CONTINUING PLAN FOR GOAL:     Goal Outcome Evaluation:    AO x 4, calm and cooperative in room  VSS, on room air, denies pain  A1 w/ walker and gait belt, calls appropriately  Continent of bowel and bladder  MAP - pt was able to successfully identify morning medications and open pill bottle  No new changes to pt this shift

## 2023-01-09 NOTE — PLAN OF CARE
FOCUS/GOAL  Bowel management, Bladder management, Pain management, Mobility, Skin integrity, and Safety management    ASSESSMENT, INTERVENTIONS AND CONTINUING PLAN FOR GOAL:  Patient is alert and oriented x 4. Denied pain, headache, dizziness, CP or SOB. A-1 with walker. Regular/thin. Continent of B/B last BM 01/08. Patient on MAP but did not call. No care concern at this time. Call light is in reach alarm is on.   Goal Outcome Evaluation:

## 2023-01-09 NOTE — PLAN OF CARE
Discharge Planner Post-Acute Rehab PT:     Discharge Plan: Home with OP PT, Family to assist with driving    Precautions: Falls, alarms    Current Status:  Bed Mobility: MOD I  Transfer: SBA with cane  Gait: SBA with cane  Stairs:CGA with single rail  Balance: difficulty with motor-motor and cog-motor dual tasking  RUBIO/56 on 23  46/56 on 23  Gait Speed: 0.79 m/sec    Assessment: Family not present for PT session, but primary therapist provided with handouts to share with family. Despite fatigue at end of session does well with trial of outdoor ambulation over uneven surfaces in open environment, continue to recommend family to provide SPV in community d/t increased falls risk in icy weather and open environment.     Other Barriers to Discharge (DME, Family Training, etc):   Family assist for driving  Initial supervision to ensure safety

## 2023-01-10 ENCOUNTER — APPOINTMENT (OUTPATIENT)
Dept: PHYSICAL THERAPY | Facility: CLINIC | Age: 83
DRG: 057 | End: 2023-01-10
Attending: PHYSICAL MEDICINE & REHABILITATION
Payer: COMMERCIAL

## 2023-01-10 ENCOUNTER — APPOINTMENT (OUTPATIENT)
Dept: SPEECH THERAPY | Facility: CLINIC | Age: 83
DRG: 057 | End: 2023-01-10
Attending: PHYSICAL MEDICINE & REHABILITATION
Payer: COMMERCIAL

## 2023-01-10 ENCOUNTER — APPOINTMENT (OUTPATIENT)
Dept: OCCUPATIONAL THERAPY | Facility: CLINIC | Age: 83
DRG: 057 | End: 2023-01-10
Attending: PHYSICAL MEDICINE & REHABILITATION
Payer: COMMERCIAL

## 2023-01-10 PROCEDURE — 97110 THERAPEUTIC EXERCISES: CPT | Mod: GP

## 2023-01-10 PROCEDURE — 99232 SBSQ HOSP IP/OBS MODERATE 35: CPT | Mod: FS | Performed by: PHYSICIAN ASSISTANT

## 2023-01-10 PROCEDURE — 250N000013 HC RX MED GY IP 250 OP 250 PS 637: Performed by: PHYSICIAN ASSISTANT

## 2023-01-10 PROCEDURE — 97129 THER IVNTJ 1ST 15 MIN: CPT | Mod: GN

## 2023-01-10 PROCEDURE — 97535 SELF CARE MNGMENT TRAINING: CPT | Mod: GO

## 2023-01-10 PROCEDURE — 97116 GAIT TRAINING THERAPY: CPT | Mod: GP

## 2023-01-10 PROCEDURE — 97130 THER IVNTJ EA ADDL 15 MIN: CPT | Mod: GN

## 2023-01-10 PROCEDURE — 128N000003 HC R&B REHAB

## 2023-01-10 RX ADMIN — AMLODIPINE BESYLATE 5 MG: 5 TABLET ORAL at 08:11

## 2023-01-10 RX ADMIN — Medication 50 MCG: at 08:11

## 2023-01-10 RX ADMIN — CLOPIDOGREL BISULFATE 75 MG: 75 TABLET, FILM COATED ORAL at 08:12

## 2023-01-10 RX ADMIN — ATORVASTATIN CALCIUM 80 MG: 80 TABLET, FILM COATED ORAL at 20:15

## 2023-01-10 RX ADMIN — Medication 1 CAPSULE: at 08:12

## 2023-01-10 RX ADMIN — ASPIRIN 81 MG CHEWABLE TABLET 81 MG: 81 TABLET CHEWABLE at 08:11

## 2023-01-10 ASSESSMENT — ACTIVITIES OF DAILY LIVING (ADL)
ADLS_ACUITY_SCORE: 34
ADLS_ACUITY_SCORE: 37
ADLS_ACUITY_SCORE: 34

## 2023-01-10 NOTE — PLAN OF CARE
FOCUS/GOAL  Bladder management, Pain management, Mobility, Cognition/Memory/Judgment/Problem solving, and Safety management    ASSESSMENT, INTERVENTIONS AND CONTINUING PLAN FOR GOAL:  Pt is alert and oriented. Continent of bladder, voiding without difficulty using toilet. Up with assist of 1 with walker to the bathroom. Denied pain or discomfort overnight. Appeared to be sleeping well during rounds. Uses call light appropriately, able to make needs known. Alarms on for safety.

## 2023-01-10 NOTE — PLAN OF CARE
Discharge Planner Post-Acute Rehab OT:     Discharge Plan: ILF with HCOT?   Family assist driving and heavy IADLs.    Recommend initial family assist in home to assist transition < 1 week.    Precautions: falls, alarms    Current Status:  ADLs:    Mobility: Ax1 cane.     Grooming: IND with set up.    Dressing: UB - Min A bra. SBA top. LB - SBA. Footwear - SBA.    Bathing: Transfer SBA cane, grab bar, ETB. Bathing SBA with A for item mgmt, verbal cues.    Toileting: Transfer CGA cane. Cares CGA.  IADLs: IND PTA, with family responsible financial mgmt. Recommend A with transportation, heavy/community-based ADLs, and med set up at discharge. Stovetop meal prep SBA with cane, vc 1x safety. Initiated MAP 1/8. SBA pet mgmt and laundry simulation.  Vision/Cognition: Glasses. Deficits in memory, R attention, inhibition, flexible thinking, and higher level EF noted functionally.     Assessment:  Extended discharge date to Saturday 1/14/23 to maximize safety strategies and ensure family availability at discharge; discharge location TBD per pt and therapist to f/u with family. Set up alarms to assist pt memory to call for MAP when indicated. Pt moved to new room yesterday, plan to assess readiness for mod I tomorrow AM once pt more familiar with environment.     Other Barriers to Discharge (DME, Family Training, etc): safe discharge plan - family unable to confirm 24/7 A initially reported at admission. Pt lives alone in ILF - Walk in shower, grab bars; standard toilet.    DME:   - shower seat: pt owns  - hurry cane: daughter ordered  - foot up: stock    Family training completed Monday 3-4pm with daughter Prachi, son Shaun, and DIL Sara.

## 2023-01-10 NOTE — PLAN OF CARE
Discharge Planner Post-Acute Rehab SLP:     Discharge Plan: home with ongoing SLP    Precautions: None from SLP perspective    Current Status:  Hearing: WNL  Vision: Reading glasses  Communication: WNL  Cognition: Mild cognitive impairment; mild reasoning and min-mild memory  Swallow: Regular/thin (0); not evaluated on ARU, no concerns    Assessment: AM: Reviewed strategies for higher level attention including: verbally process, review work, and take notes as needed. The pt participated in mod-complex task targeting planning, organization, and reasoning ([place]). She participated in game with overall min cues and had excellent recall of strategies/goals IND. Mild cues were required for flexible thinking and reasoning when attention was divided between tasks. She demonstrated good insight to deficits.    PM: Pt recalled parameters for task from this AM with 100% accuracy and participated in task again with min cues overall. She completed mod-complex recall task (16-word list) following review of strategies. With min cues to implement strategies, she had immediate recall of 14/16 words; inc to 15/16 with max cues. She noted that memory is close to baseline, however she does benefit from emphasizing strategies.    Other Barriers to Discharge (Family Training, etc): family training in strategies/support to increase independence.

## 2023-01-10 NOTE — PROGRESS NOTES
"  [facility]   Acute Rehabilitation Unit  Daily progress note    INTERVAL HISTORY  Inga Coats was seen at bedside this morning.  No acute events reported overnight.  She reports that she is feeling very well overall.  She is a little disappointed/frustrated that her family is planning for her to discharge to an alternative location with additional supports, and feels she may need to look at a place closer to them longer term.  She is ok with temporarily going somewhere with more supports available, but does want to eventually return to her prior living situation.  She remains hopeful though a little \"impatient\" about stroke recovery.  Denies any other concerns or questions.  Denies chest pain, palpitations, shortness of breath, dizziness, nausea, bowel or bladder concerns/changes.    Functionally, she is needing assist with cane for mobility, just moved to new room but therapy team will reassess for mod I tomorrow once familiar with this environment.  She needs min A for bra, SBA for dressing otherwise, SBA for bathing, CGA for toileting.      MEDICATIONS    - Medication Assessment Program - Rehab Services   Does not apply See Admin Instructions     amLODIPine  5 mg Oral Daily     aspirin  81 mg Oral Daily     atorvastatin  80 mg Oral QPM     clopidogrel  75 mg Oral Daily     multivitamin  with lutein  1 capsule Oral Daily     vitamin D3  50 mcg Oral Daily        acetaminophen, polyethylene glycol, senna-docusate     PHYSICAL EXAM  BP (!) 149/61 (BP Location: Left arm, Patient Position: Sitting, Cuff Size: Adult Regular)   Pulse 84   Temp 96.8  F (36  C) (Oral)   Resp 16   Ht 1.638 m (5' 4.5\")   Wt 63.5 kg (140 lb)   SpO2 97%   BMI 23.66 kg/m     Gen: NAD, seated upright in chair  HEENT: NC/AT, MMM  Cardio: RRR, cardiac monitor L upper chest  Pulm: non-labored on room air  Abd: soft, non-tender, non-distended  Ext: no appreciable edema in bilateral lower " extremities  Neuro/MSK: AAO, speech clear/fluent, PERRL, EOMI, R-sided incoordination/ataxia    LABS  CBC RESULTS:   Recent Labs   Lab Test 01/09/23  0551 01/05/23  1127 12/31/22  0710   WBC 5.8 7.4 6.4   RBC 4.49 4.56 4.43   HGB 12.9 13.2 12.6   HCT 39.0 38.6 37.7   MCV 87 85 85   MCH 28.7 28.9 28.4   MCHC 33.1 34.2 33.4   RDW 13.5 13.6 14.2    334 288     Last Basic Metabolic Panel:  Recent Labs   Lab Test 01/09/23  0551 01/05/23  1127 01/02/23  1048    133 135   POTASSIUM 4.2 4.6 4.4   CHLORIDE 106 101 105   CO2 28 23 25   ANIONGAP 3 9 5   GLC 99 96 95   BUN 15 22 17   CR 0.80 0.74 0.74   GFRESTIMATED 72 79 79   JAC 9.5 9.9 9.1       Rehabilitation - continue comprehensive acute inpatient rehabilitation program with multidisciplinary approach including therapies, rehab nursing, and physiatry following. See interval history for updates.      ASSESSMENT AND PLAN  Inga Coats is a 82 year old right hand dominant female with a past medical history of CAD s/p CABG x3, aortic stenosis s/p AVR, TIA, hyperlipidemia, and CKD 3 who was admitted on 12/30/22 with subacute left corona radiata stroke with hospital course complicated by elevated BP, new prediabetes diagnosis, and fluctuating stroke symptoms.  She is now admitted to ARU on 1/3/23 for multidisciplinary rehabilitation and ongoing medical management.        Admission to acute inpatient rehab 01/03/23.    Impairment group code: Stroke Ischemic 01.2 (R) Body Involvement (L) Brain; Acute vs subacute left corona radiata stroke        1. PT, OT and SLP 60 minutes of each on a daily basis, in addition to rehab nursing and close management of physiatrist.       2. Impairment of ADL's: Noted to have impaired activity tolerance, impaired balance, impaired cognition, impaired coordination, impaired judgement and safety awareness, impaired strength, impaired tone, impaired weight shifting and impulsiveness, all affecting her ability to safely and  independently perform basic ADLs.  Goal for mod I with basic ADLs.     3. Impairment of mobility:  Noted to have impaired activity tolerance, impaired balance, impaired cognition, impaired coordination, impaired judgement and safety awareness, impaired strength, impaired tone, impaired weight shifting and impulsiveness, all affecting her ability to safely and independently perform basic mobility.  Goal for mod I with basic mobility.     4. Impairment of cognition/language/swallow:  Noted to have some possible cognitive deficits.  Would benefit from full cognitive-linguistic evaluation with SLP at ARU with goal for improved cognitive-linguistic skills to meet basic needs.     5. Medical Conditions  New actions/orders/updates for today are in blue.     Subacute left corona radiata stroke  Presented with with right hand incoordination, RUE weakness and possible shuffling gait on RLE which resolved at time of admission.    * MRI brain showed acute to subacute infarct in the left corona radiata.   * MRA neck - Patent arteries in the neck without evidence of dissection.  Apparent atherosclerotic disease of right carotid bifurcation and proximal internal carotid artery which appears to cause approximately 50% stenosis by NASCET criteria  * Loaded with plavix in ED, then started on ASA, Plavix.    * 12/31 AM: Recurrent symptoms of right upper ext ataxia, drift, weakness. Neurology contacted. Repeat CT/CTA head and neck CTA without changes (see report for detail).  * Per neuro, stroke likely due to intracranial atherosclerosis  * TTE-LVEF 65-70%, no thrombus seen in LV  * 1/1 - reporting more weakness in RUE. Neuro evaluated on 1/1 due to flunctuating symptoms, recommended to avoid hypotension, MRI brain w/o if sxs persist.  RUE weakness has been stable since per inpatient team.  - Continue aspirin 81 mg daily  - Continue Plavix 75 mg daily x90 days (started on 12/31)  - PTA Lipitor increased to 80 mg daily, LDL 96  -  Outpatient BP <130/80.  Avoid hypotenstion.  Management as below.  - 30-day cardiac monitor at discharge per neurology.  Monitor in place at ARU admission.  - Stroke PLC completed while inpatient on 1/3  - Continue PT/OT/SLP  - Follow-up with [facility]     Hypertension  BP elevated, started amlodipine 5mg daily on 1/3.    - Per neurology, avoid rapid decrease in BP and needs gradual titration of medication for optimal BP control with long-term (OP) goal <130/80.  - Continue amlodipine 5 mg daily  - BP slightly elevated, 120s-150s.  Continue to monitor but if trend continues may need to increase dose.  - Monitor BP     CAD status post three-vessel CABG  Aortic stenosis s/p aortic valve replacement  - Antiplatelet and statin therapy as above     Hyperlipidemia  PTA statin increased as above due to LDL 96.  - Continue atorvastatin 80 mg daily  - Goal LDL <70     CKD 3  Baseline Cr ~1.  Stable this admission 0.7-0.8.  - Avoid nephrotoxins as able  - Monitor BMP every M/Th.  Cr stable on 1/9 at 0.8     Hyperglycemia w/ prediabetes  Newly identified this admission, A1c 6%  - Goal A1c <7 for secondary stroke prevention  - Follow up with PCP for ongoing management/surveillance       6. Adjustment to disability:  Clinical psychology to eval and treat if indicated  7. FEN: regular diet, thin liquids  8. Bowel: continent, monitor, PRN bowel meds available  9. Bladder: continent, PVRs at admission with no evidence of retention  10. DVT Prophylaxis: mechanical  11. GI Prophylaxis: not indicated  12. Code: full, confirmed at admission  13. Disposition: goal for home  14. ELOS:  1/12/23  15. Follow up Appointments on Discharge: PCP in 1-2 weeks, stroke neurology ([facility]) in 6-8 weeks      Patient discussed with Dr. Edgard Bryant, PM&R staff physician     Joann Nowak PA-C  Physical Medicine & Rehabilitation

## 2023-01-10 NOTE — PROGRESS NOTES
[facility] and [facility] declined. Referral sent and pending with [facility]. Will remain available and continue to follow.     ADDENDUM: [facility] accepted. Contact information added to pt AVS. Will update HC once final discharge date confirmed and will update pt/pt family.     Home Health Care:   [facility]   PH: [phone]  F: [phone] or F: [phone]  [email or web address]  [email or web address]    KEY Hong   [facility]   Direct Phone: [phone]  I   Pager: [phone]  I  Fax: [phone]

## 2023-01-10 NOTE — PLAN OF CARE
Goal Outcome Evaluation: Improving    Cognition: Pt is AOx4  Pain: Denied pain.  Skin: No new concerns.  Bowel/bladder: Continent of bowel and bladder.  Transfer: A1 with walker and gait belt.  Vital signs: VSS. Denied SOB, chest pain, numbness/tingling.  Diet: reg/thin/whole  MAP Program: Pt appropriately called for correct meds at correct time.  Safety: Pt attempts to self-transfer from bathroom to bed/chair. RN stayed with pt at all times.  Bed/chair alarm on for safety. Call light in reach.

## 2023-01-10 NOTE — PLAN OF CARE
Pt A&O x4. A of 1 w/ cane. No complaints of pain. Cont. Required reminder to call for MAP. Able to pick out AM meds appropriately. Continue w/ plan of care.

## 2023-01-11 ENCOUNTER — APPOINTMENT (OUTPATIENT)
Dept: OCCUPATIONAL THERAPY | Facility: CLINIC | Age: 83
DRG: 057 | End: 2023-01-11
Attending: PHYSICAL MEDICINE & REHABILITATION
Payer: COMMERCIAL

## 2023-01-11 ENCOUNTER — APPOINTMENT (OUTPATIENT)
Dept: PHYSICAL THERAPY | Facility: CLINIC | Age: 83
DRG: 057 | End: 2023-01-11
Attending: PHYSICAL MEDICINE & REHABILITATION
Payer: COMMERCIAL

## 2023-01-11 ENCOUNTER — APPOINTMENT (OUTPATIENT)
Dept: SPEECH THERAPY | Facility: CLINIC | Age: 83
DRG: 057 | End: 2023-01-11
Attending: PHYSICAL MEDICINE & REHABILITATION
Payer: COMMERCIAL

## 2023-01-11 PROCEDURE — 97129 THER IVNTJ 1ST 15 MIN: CPT | Mod: GN | Performed by: SPEECH-LANGUAGE PATHOLOGIST

## 2023-01-11 PROCEDURE — 97530 THERAPEUTIC ACTIVITIES: CPT | Mod: GP

## 2023-01-11 PROCEDURE — 97116 GAIT TRAINING THERAPY: CPT | Mod: GP

## 2023-01-11 PROCEDURE — 97535 SELF CARE MNGMENT TRAINING: CPT | Mod: GO | Performed by: OCCUPATIONAL THERAPIST

## 2023-01-11 PROCEDURE — 250N000013 HC RX MED GY IP 250 OP 250 PS 637: Performed by: PHYSICAL MEDICINE & REHABILITATION

## 2023-01-11 PROCEDURE — 99232 SBSQ HOSP IP/OBS MODERATE 35: CPT | Mod: FS | Performed by: PHYSICIAN ASSISTANT

## 2023-01-11 PROCEDURE — 97535 SELF CARE MNGMENT TRAINING: CPT | Mod: GO

## 2023-01-11 PROCEDURE — 250N000013 HC RX MED GY IP 250 OP 250 PS 637: Performed by: PHYSICIAN ASSISTANT

## 2023-01-11 PROCEDURE — 97130 THER IVNTJ EA ADDL 15 MIN: CPT | Mod: GN | Performed by: SPEECH-LANGUAGE PATHOLOGIST

## 2023-01-11 PROCEDURE — 128N000003 HC R&B REHAB

## 2023-01-11 PROCEDURE — 97110 THERAPEUTIC EXERCISES: CPT | Mod: GO

## 2023-01-11 RX ORDER — AMLODIPINE BESYLATE 5 MG/1
10 TABLET ORAL DAILY
Status: DISCONTINUED | OUTPATIENT
Start: 2023-01-11 | End: 2023-01-14 | Stop reason: HOSPADM

## 2023-01-11 RX ORDER — AMLODIPINE BESYLATE 10 MG/1
10 TABLET ORAL DAILY
Status: DISCONTINUED | OUTPATIENT
Start: 2023-01-11 | End: 2023-01-11 | Stop reason: RX

## 2023-01-11 RX ADMIN — Medication 50 MCG: at 09:09

## 2023-01-11 RX ADMIN — ATORVASTATIN CALCIUM 80 MG: 80 TABLET, FILM COATED ORAL at 20:38

## 2023-01-11 RX ADMIN — Medication 1 CAPSULE: at 09:10

## 2023-01-11 RX ADMIN — ASPIRIN 81 MG CHEWABLE TABLET 81 MG: 81 TABLET CHEWABLE at 09:10

## 2023-01-11 RX ADMIN — AMLODIPINE BESYLATE 10 MG: 5 TABLET ORAL at 09:59

## 2023-01-11 RX ADMIN — CLOPIDOGREL BISULFATE 75 MG: 75 TABLET, FILM COATED ORAL at 09:10

## 2023-01-11 ASSESSMENT — ACTIVITIES OF DAILY LIVING (ADL)
ADLS_ACUITY_SCORE: 34
ADLS_ACUITY_SCORE: 38
ADLS_ACUITY_SCORE: 34
ADLS_ACUITY_SCORE: 38
ADLS_ACUITY_SCORE: 34
ADLS_ACUITY_SCORE: 34
ADLS_ACUITY_SCORE: 37
ADLS_ACUITY_SCORE: 34

## 2023-01-11 NOTE — PROGRESS NOTES
[facility] intake updated on discharge date. [facility] intake confirmed that they will pull orders directly from [facility] and no need to fax anything at time of discharge.     Pt son called. Provided him with resources for hiring in help, as requested. Also notified pt son of HC set up, agency name, and that contact information is in the AVS. Pt son expressed appreciation and denied additional needs.     [facility] information in AVS. Need to update pt and complete IRF and IMM prior to discharge home.      Home Health Care:   [facility]   PH: [phone]  F: [phone] or F: [phone]  Intake--[email or web address] &  [email or web address]  RN, PT, OT, SLP, and KEY Mcdaniel   [facility]   Direct Phone: [phone]  I   Pager: [phone]  I  Fax: [phone]

## 2023-01-11 NOTE — PLAN OF CARE
Goal Outcome Evaluation:  Pt is alert and oriented x 4, she denies pain or discomfort. She has been on MAP, she called in time and picked up the right medication and dose. She is now modified independent in the room using a walker during the day and stand by assist at night. We will continue to encourage independence while assisting with activity of daily livings.

## 2023-01-11 NOTE — PROGRESS NOTES
"  [facility]   Acute Rehabilitation Unit  Daily progress note    INTERVAL HISTORY  Inga Coats was seen at bedside this afternoon.  No acute events reported overnight.  She reports that she is feeling well overall, though a little \"bored\" this afternoon as she had all her therapy sessions in AM.  She denies any concerns or changes otherwise, feeling well physically.  Discussed increase of antihypertensive dose for better BP management.  Also discussed some discharge planning.    Functionally, she has now progressed to mod I in room with cane during day, with standby assist at night.  She needs SBA for longer-distance mobility with cane, and SBA with single rail on stairs.      MEDICATIONS    - Medication Assessment Program - Rehab Services   Does not apply See Admin Instructions     amLODIPine  10 mg Oral Daily     aspirin  81 mg Oral Daily     atorvastatin  80 mg Oral QPM     clopidogrel  75 mg Oral Daily     multivitamin  with lutein  1 capsule Oral Daily     vitamin D3  50 mcg Oral Daily        acetaminophen, polyethylene glycol, senna-docusate     PHYSICAL EXAM  BP (!) 152/54 (BP Location: Left arm, Patient Position: Semi-Watkins's, Cuff Size: Adult Regular)   Pulse 69   Temp (!) 96.5  F (35.8  C) (Oral)   Resp 20   Ht 1.638 m (5' 4.5\")   Wt 63.5 kg (140 lb)   SpO2 96%   BMI 23.66 kg/m     Gen: NAD, seated upright in chair  HEENT: NC/AT, MMM  Cardio: RRR, cardiac monitor L upper chest  Pulm: non-labored on room air  Abd: soft, non-tender, non-distended  Ext: no appreciable edema in bilateral lower extremities  Neuro/MSK: AAO, speech clear/fluent, PERRL, EOMI, R-sided incoordination/ataxia    LABS  CBC RESULTS:   Recent Labs   Lab Test 01/09/23  0551 01/05/23  1127 12/31/22  0710   WBC 5.8 7.4 6.4   RBC 4.49 4.56 4.43   HGB 12.9 13.2 12.6   HCT 39.0 38.6 37.7   MCV 87 85 85   MCH 28.7 28.9 28.4   MCHC 33.1 34.2 33.4   RDW 13.5 13.6 14.2    334 288 "     Last Basic Metabolic Panel:  Recent Labs   Lab Test 01/09/23  0551 01/05/23  1127 01/02/23  1048    133 135   POTASSIUM 4.2 4.6 4.4   CHLORIDE 106 101 105   CO2 28 23 25   ANIONGAP 3 9 5   GLC 99 96 95   BUN 15 22 17   CR 0.80 0.74 0.74   GFRESTIMATED 72 79 79   JAC 9.5 9.9 9.1       Rehabilitation - continue comprehensive acute inpatient rehabilitation program with multidisciplinary approach including therapies, rehab nursing, and physiatry following. See interval history for updates.      ASSESSMENT AND PLAN  Inga Coats is a 82 year old right hand dominant female with a past medical history of CAD s/p CABG x3, aortic stenosis s/p AVR, TIA, hyperlipidemia, and CKD 3 who was admitted on 12/30/22 with subacute left corona radiata stroke with hospital course complicated by elevated BP, new prediabetes diagnosis, and fluctuating stroke symptoms.  She is now admitted to ARU on 1/3/23 for multidisciplinary rehabilitation and ongoing medical management.        Admission to acute inpatient rehab 01/03/23.    Impairment group code: Stroke Ischemic 01.2 (R) Body Involvement (L) Brain; Acute vs subacute left corona radiata stroke        1. PT, OT and SLP 60 minutes of each on a daily basis, in addition to rehab nursing and close management of physiatrist.       2. Impairment of ADL's: Noted to have impaired activity tolerance, impaired balance, impaired cognition, impaired coordination, impaired judgement and safety awareness, impaired strength, impaired tone, impaired weight shifting and impulsiveness, all affecting her ability to safely and independently perform basic ADLs.  Goal for mod I with basic ADLs.     3. Impairment of mobility:  Noted to have impaired activity tolerance, impaired balance, impaired cognition, impaired coordination, impaired judgement and safety awareness, impaired strength, impaired tone, impaired weight shifting and impulsiveness, all affecting her ability to safely and  independently perform basic mobility.  Goal for mod I with basic mobility.     4. Impairment of cognition/language/swallow:  Noted to have some possible cognitive deficits.  Would benefit from full cognitive-linguistic evaluation with SLP at ARU with goal for improved cognitive-linguistic skills to meet basic needs.     5. Medical Conditions  New actions/orders/updates for today are in blue.     Subacute left corona radiata stroke  Presented with with right hand incoordination, RUE weakness and possible shuffling gait on RLE which resolved at time of admission.    * MRI brain showed acute to subacute infarct in the left corona radiata.   * MRA neck - Patent arteries in the neck without evidence of dissection.  Apparent atherosclerotic disease of right carotid bifurcation and proximal internal carotid artery which appears to cause approximately 50% stenosis by NASCET criteria  * Loaded with plavix in ED, then started on ASA, Plavix.    * 12/31 AM: Recurrent symptoms of right upper ext ataxia, drift, weakness. Neurology contacted. Repeat CT/CTA head and neck CTA without changes (see report for detail).  * Per neuro, stroke likely due to intracranial atherosclerosis  * TTE-LVEF 65-70%, no thrombus seen in LV  * 1/1 - reporting more weakness in RUE. Neuro evaluated on 1/1 due to flunctuating symptoms, recommended to avoid hypotension, MRI brain w/o if sxs persist.  RUE weakness has been stable since per inpatient team.  - Continue aspirin 81 mg daily  - Continue Plavix 75 mg daily x90 days (started on 12/31)  - PTA Lipitor increased to 80 mg daily, LDL 96  - Outpatient BP <130/80.  Avoid hypotenstion.  Management as below.  - 30-day cardiac monitor at discharge per neurology.  Monitor in place at ARU admission.  - Stroke PLC completed while inpatient on 1/3  - Continue PT/OT/SLP  - Follow-up with [facility]     Hypertension  BP elevated, started amlodipine 5mg daily on 1/3.    - Per neurology, avoid  rapid decrease in BP and needs gradual titration of medication for optimal BP control with long-term (OP) goal <130/80.  - BP trend remains elevated above goal (SBP 120s-160s), increase amlodipine to 10 mg daily.  - Monitor BP     CAD status post three-vessel CABG  Aortic stenosis s/p aortic valve replacement  - Antiplatelet and statin therapy as above     Hyperlipidemia  PTA statin increased as above due to LDL 96.  - Continue atorvastatin 80 mg daily  - Goal LDL <70     CKD 3  Baseline Cr ~1.  Stable this admission 0.7-0.8.  - Avoid nephrotoxins as able  - Monitor BMP every M/Th.  Cr stable on 1/9 at 0.8     Hyperglycemia w/ prediabetes  Newly identified this admission, A1c 6%  - Goal A1c <7 for secondary stroke prevention  - Follow up with PCP for ongoing management/surveillance       6. Adjustment to disability:  Clinical psychology to eval and treat if indicated  7. FEN: regular diet, thin liquids  8. Bowel: continent, monitor, PRN bowel meds available  9. Bladder: continent, PVRs at admission with no evidence of retention  10. DVT Prophylaxis: mechanical  11. GI Prophylaxis: not indicated  12. Code: full, confirmed at admission  13. Disposition: goal for home  14. ELOS:  1/14/23  15. Follow up Appointments on Discharge: PCP in 1-2 weeks, stroke neurology ([facility]) in 6-8 weeks      Patient discussed with Dr. Edgard Bryant, PM&R staff physician     Joann Nowak PA-C  Physical Medicine & Rehabilitation

## 2023-01-11 NOTE — PROGRESS NOTES
SPIRITUAL HEALTH SERVICES Progress Note  [facility] ([facility] Acute Rehab    Brief  visit with pt, per length of stay (pt has not requested a  visit during this admission), to:   introduce myself to pt as unit  and asssess spiritual needs   orient pt to Spiritual Health Services   confirm that pt is [employer] as is noted in Epic  Pt very pleasant, declined  support, said she has very good support at this time. No further needs indicated at this time. Pt knows she can request  support if ever desired.     Myles Hammond) Bridget May M.Div., [facility]  Staff   Pager [phone]      * [facility] remains available 24/7 for emergent requests/referrals, either by having the switchboard page the on-call  or by entering an ASAP/STAT consult in Epic (this will also page the on-call ). Routine Epic consults receive an initial response within 24 hours.*

## 2023-01-11 NOTE — PROGRESS NOTES
Discharge Planner Post-Acute Rehab OT:      Discharge Plan: ILF with HCOT?   Family assist driving and heavy IADLs.    Recommend initial family assist in home to assist transition < 1 week.     Precautions: falls, alarms     Current Status:  ADLs:    Mobility: Mod IND with cane during day and SBA at night    Grooming: IND    Dressing: Mod IND with SEC    Bathing: Transfer SBA cane, grab bar, ETB. Bathing SBA with A for item mgmt, verbal cues.    Toileting: Mod IND with SEC  IADLs: IND PTA, with family responsible financial mgmt. Recommend A with transportation, heavy/community-based ADLs, and med set up at discharge. Stovetop meal prep SBA with cane, vc 1x safety. Initiated MAP 1/8. SBA pet mgmt and laundry simulation.  Vision/Cognition: Glasses. Deficits in memory, R attention, inhibition, flexible thinking, and higher level EF noted functionally.      Assessment: Pt upgraded to mod IND with SEC in BR during day and SBA at night with SEC.      Other Barriers to Discharge (DME, Family Training, etc): safe discharge plan - family unable to confirm 24/7 A initially reported at admission. Pt lives alone in ILF - Walk in shower, grab bars; standard toilet.     DME:   - shower seat: pt owns  - hurry cane: daughter ordered  - foot up: stock     Family training completed Monday 3-4pm with daughter Prachi, son Shaun, and ISAEL Ruiz.

## 2023-01-11 NOTE — PLAN OF CARE
Goal Outcome Evaluation:    Overall Patient Progress: improvingOverall Patient Progress: improving    Patient is alert and oriented x4. Calls appropriately. No complaints of pain, sob, dizziness, fever or numbness and tingling. Continent of bladder using the bathroom. Min A1 with the walker in transfers. Sleeping during hourly safety rounds. VSS this morning. Will continue poc.

## 2023-01-11 NOTE — PLAN OF CARE
Goal Outcome Evaluation:      Plan of Care Reviewed With: patient    Overall Patient Progress: improvingOverall Patient Progress: improving    Outcome Evaluation: Pt is A&OX4, calm, pleasant, & cooperative with care. Denied CP, SOB, & n/v. VSS & on RA. A of 1 with cane & GB; R-sided weakness. Continent for both B&B; uses the bathroom. Takes med whole with thin liquid. On combination regular diet & thin liquid (level 0). Ate dinner with good appetite. LBM on 01/09/23 per pt's chart. Pt is able to make needs known & call light within reach. Will continue with plan of care.    Patient's most recent vital signs are:     Vital signs:  BP: 136/54  Temp: 97.5  HR: 70  RR: 16  SpO2: 95 %     Patient does not have new respiratory symptoms.  Patient does not have new sore throat.  Patient does not have a fever greater than 99.5.

## 2023-01-11 NOTE — PLAN OF CARE
Discharge Planner Post-Acute Rehab SLP:     Discharge Plan: home with ongoing SLP    Precautions: None from SLP perspective    Current Status:  Hearing: WNL  Vision: Reading glasses  Communication: WNL  Cognition: Mild cognitive impairment; mild reasoning and min-mild memory  Swallow: Regular/thin (0); not evaluated on ARU, no concerns    Assessment: Instructed pt in moderate complexity plannign task, pt completed with 100% accuracy. Instructed pt in high complexity check book register task, pt completed with long hand math with mild cues for attention/accuracy with cents.    Other Barriers to Discharge (Family Training, etc): family training in strategies/support to increase independence.

## 2023-01-11 NOTE — PLAN OF CARE
Discharge Planner Post-Acute Rehab PT:     Discharge Plan: Home with OP PT, Family to assist with driving    Precautions: Falls, alarms    Current Status:  Bed Mobility: MOD I  Transfer: SBA with cane  Gait: SBA with cane  Stairs:CGA with single rail  Balance: difficulty with motor-motor and cog-motor dual tasking  RUBIO/56 on 23  46/56 on 23  Gait Speed: 0.79 m/sec    Assessment: Improving daily with gait safety. Progressed to multidirectional stepping without cane today. Will assess for MOD I in room tomorrow, and discharge on Saturday     Other Barriers to Discharge (DME, Family Training, etc):   Family assist for driving  Initial supervision to ensure safety

## 2023-01-11 NOTE — PLAN OF CARE
Discharge Planner Post-Acute Rehab PT:     Discharge Plan: Home with OP PT, Family to assist with driving    Precautions: Falls, alarms    Current Status:  Bed Mobility: MOD I  Transfer: MOD in room with cane  Gait: MOD in room with cane, SBA in halls  Stairs: SBA with single rail  Balance: difficulty with motor-motor and cog-motor dual tasking  RUBIO/56 on 23  46/56 on 23  Gait Speed: 0.79 m/sec    Assessment: Treadmill training with improves R foot clearance and gait speed. Unclear discharge location, requested that pt clarify with family by tomorrow so f/u services can be setup.    Other Barriers to Discharge (DME, Family Training, etc):   Family assist for driving  Initial supervision to ensure safety

## 2023-01-12 ENCOUNTER — APPOINTMENT (OUTPATIENT)
Dept: PHYSICAL THERAPY | Facility: CLINIC | Age: 83
DRG: 057 | End: 2023-01-12
Attending: PHYSICAL MEDICINE & REHABILITATION
Payer: COMMERCIAL

## 2023-01-12 ENCOUNTER — APPOINTMENT (OUTPATIENT)
Dept: OCCUPATIONAL THERAPY | Facility: CLINIC | Age: 83
DRG: 057 | End: 2023-01-12
Attending: PHYSICAL MEDICINE & REHABILITATION
Payer: COMMERCIAL

## 2023-01-12 ENCOUNTER — APPOINTMENT (OUTPATIENT)
Dept: SPEECH THERAPY | Facility: CLINIC | Age: 83
DRG: 057 | End: 2023-01-12
Attending: PHYSICAL MEDICINE & REHABILITATION
Payer: COMMERCIAL

## 2023-01-12 PROCEDURE — 97750 PHYSICAL PERFORMANCE TEST: CPT | Mod: GP | Performed by: PHYSICAL THERAPIST

## 2023-01-12 PROCEDURE — 97130 THER IVNTJ EA ADDL 15 MIN: CPT | Mod: GN | Performed by: SPEECH-LANGUAGE PATHOLOGIST

## 2023-01-12 PROCEDURE — 999N000125 HC STATISTIC PATIENT MED CONFERENCE < 30 MIN: Performed by: SPEECH-LANGUAGE PATHOLOGIST

## 2023-01-12 PROCEDURE — 97130 THER IVNTJ EA ADDL 15 MIN: CPT | Mod: GN

## 2023-01-12 PROCEDURE — 999N000125 HC STATISTIC PATIENT MED CONFERENCE < 30 MIN

## 2023-01-12 PROCEDURE — 250N000013 HC RX MED GY IP 250 OP 250 PS 637: Performed by: PHYSICAL MEDICINE & REHABILITATION

## 2023-01-12 PROCEDURE — 999N000150 HC STATISTIC PT MED CONFERENCE < 30 MIN

## 2023-01-12 PROCEDURE — 97112 NEUROMUSCULAR REEDUCATION: CPT | Mod: GP | Performed by: PHYSICAL THERAPIST

## 2023-01-12 PROCEDURE — 250N000013 HC RX MED GY IP 250 OP 250 PS 637: Performed by: PHYSICIAN ASSISTANT

## 2023-01-12 PROCEDURE — 97129 THER IVNTJ 1ST 15 MIN: CPT | Mod: GN | Performed by: SPEECH-LANGUAGE PATHOLOGIST

## 2023-01-12 PROCEDURE — 97129 THER IVNTJ 1ST 15 MIN: CPT | Mod: GN

## 2023-01-12 PROCEDURE — 97110 THERAPEUTIC EXERCISES: CPT | Mod: GP | Performed by: PHYSICAL THERAPIST

## 2023-01-12 PROCEDURE — 99232 SBSQ HOSP IP/OBS MODERATE 35: CPT | Mod: FS | Performed by: PHYSICAL MEDICINE & REHABILITATION

## 2023-01-12 PROCEDURE — 97535 SELF CARE MNGMENT TRAINING: CPT | Mod: GO

## 2023-01-12 PROCEDURE — 128N000003 HC R&B REHAB

## 2023-01-12 RX ORDER — ASPIRIN 81 MG/1
81 TABLET, CHEWABLE ORAL DAILY
Qty: 30 TABLET | Refills: 0 | Status: ON HOLD | OUTPATIENT
Start: 2023-01-12 | End: 2023-04-05

## 2023-01-12 RX ORDER — AMLODIPINE BESYLATE 10 MG/1
10 TABLET ORAL DAILY
Qty: 30 TABLET | Refills: 0 | Status: ON HOLD | OUTPATIENT
Start: 2023-01-13 | End: 2023-04-07

## 2023-01-12 RX ORDER — CLOPIDOGREL BISULFATE 75 MG/1
75 TABLET ORAL DAILY
Qty: 75 TABLET | Refills: 0 | Status: ON HOLD | OUTPATIENT
Start: 2023-01-15 | End: 2023-04-07

## 2023-01-12 RX ORDER — ATORVASTATIN CALCIUM 80 MG/1
80 TABLET, FILM COATED ORAL EVERY EVENING
Qty: 30 TABLET | Refills: 0 | Status: SHIPPED | OUTPATIENT
Start: 2023-01-12

## 2023-01-12 RX ORDER — ACETAMINOPHEN 325 MG/1
325-650 TABLET ORAL EVERY 4 HOURS PRN
Status: ON HOLD | COMMUNITY
Start: 2023-01-12 | End: 2023-04-05

## 2023-01-12 RX ADMIN — ATORVASTATIN CALCIUM 80 MG: 80 TABLET, FILM COATED ORAL at 20:25

## 2023-01-12 RX ADMIN — AMLODIPINE BESYLATE 10 MG: 5 TABLET ORAL at 08:21

## 2023-01-12 RX ADMIN — ASPIRIN 81 MG CHEWABLE TABLET 81 MG: 81 TABLET CHEWABLE at 08:21

## 2023-01-12 RX ADMIN — CLOPIDOGREL BISULFATE 75 MG: 75 TABLET, FILM COATED ORAL at 08:21

## 2023-01-12 RX ADMIN — Medication 50 MCG: at 08:21

## 2023-01-12 RX ADMIN — Medication 1 CAPSULE: at 08:21

## 2023-01-12 ASSESSMENT — ACTIVITIES OF DAILY LIVING (ADL)
ADLS_ACUITY_SCORE: 34
ADLS_ACUITY_SCORE: 34
ADLS_ACUITY_SCORE: 33
ADLS_ACUITY_SCORE: 33
ADLS_ACUITY_SCORE: 34
ADLS_ACUITY_SCORE: 34
ADLS_ACUITY_SCORE: 33
ADLS_ACUITY_SCORE: 34

## 2023-01-12 NOTE — PROGRESS NOTES
"  [facility]   Acute Rehabilitation Unit  Daily progress note    INTERVAL HISTORY  Inga Coats was seen at bedside this morning during team rounds.  No acute events reported overnight.  She reports that she is feeling well overall, more comfortable about upcoming discharge given ongoing functional progress.  She did discuss plans with her family and will now be discharging to her own home, will have family support initially for at least the first day.  Social work did give family some resources for hired help.  Denies any other concerns or questions at this time.    Functionally, she has made significant improvements with mobility and ADLs.  Now mod I in room during day and anticipate will be at night as well before discharge.  Still benefits from use of cane and brace.  Balance and RUBIO scores have improved.  Cognition still can be limiting, needs cues to slow down and can demonstrate lack of attention to detail.  On track for discharge home on Saturday, 1/14.  For full functional updates, see team rounds note from today.      MEDICATIONS    amLODIPine  10 mg Oral Daily     aspirin  81 mg Oral Daily     atorvastatin  80 mg Oral QPM     clopidogrel  75 mg Oral Daily     multivitamin  with lutein  1 capsule Oral Daily     vitamin D3  50 mcg Oral Daily        acetaminophen, polyethylene glycol, senna-docusate     PHYSICAL EXAM  BP (!) 155/57 (BP Location: Left arm)   Pulse 67   Temp 97.5  F (36.4  C) (Oral)   Resp 20   Ht 1.638 m (5' 4.5\")   Wt 63.4 kg (139 lb 12.8 oz)   SpO2 95%   BMI 23.63 kg/m     Gen: NAD, seated upright in chair  HEENT: NC/AT, MMM  Cardio: RRR, cardiac monitor L upper chest  Pulm: non-labored on room air  Abd: soft, non-tender, non-distended  Ext: no appreciable edema in bilateral lower extremities  Neuro/MSK: AAO, speech clear/fluent, PERRL, EOMI  *Full exam deferred today for conversation    LABS  CBC RESULTS:   Recent Labs   Lab Test " 01/09/23  0551 01/05/23  1127 12/31/22  0710   WBC 5.8 7.4 6.4   RBC 4.49 4.56 4.43   HGB 12.9 13.2 12.6   HCT 39.0 38.6 37.7   MCV 87 85 85   MCH 28.7 28.9 28.4   MCHC 33.1 34.2 33.4   RDW 13.5 13.6 14.2    334 288     Last Basic Metabolic Panel:  Recent Labs   Lab Test 01/09/23  0551 01/05/23  1127 01/02/23  1048    133 135   POTASSIUM 4.2 4.6 4.4   CHLORIDE 106 101 105   CO2 28 23 25   ANIONGAP 3 9 5   GLC 99 96 95   BUN 15 22 17   CR 0.80 0.74 0.74   GFRESTIMATED 72 79 79   JAC 9.5 9.9 9.1       Rehabilitation - continue comprehensive acute inpatient rehabilitation program with multidisciplinary approach including therapies, rehab nursing, and physiatry following. See interval history for updates.      ASSESSMENT AND PLAN  Inga Coats is a 82 year old right hand dominant female with a past medical history of CAD s/p CABG x3, aortic stenosis s/p AVR, TIA, hyperlipidemia, and CKD 3 who was admitted on 12/30/22 with subacute left corona radiata stroke with hospital course complicated by elevated BP, new prediabetes diagnosis, and fluctuating stroke symptoms.  She is now admitted to ARU on 1/3/23 for multidisciplinary rehabilitation and ongoing medical management.        Admission to acute inpatient rehab 01/03/23.    Impairment group code: Stroke Ischemic 01.2 (R) Body Involvement (L) Brain; Acute vs subacute left corona radiata stroke        1. PT, OT and SLP 60 minutes of each on a daily basis, in addition to rehab nursing and close management of physiatrist.       2. Impairment of ADL's: Noted to have impaired activity tolerance, impaired balance, impaired cognition, impaired coordination, impaired judgement and safety awareness, impaired strength, impaired tone, impaired weight shifting and impulsiveness, all affecting her ability to safely and independently perform basic ADLs.  Goal for mod I with basic ADLs.     3. Impairment of mobility:  Noted to have impaired activity tolerance,  impaired balance, impaired cognition, impaired coordination, impaired judgement and safety awareness, impaired strength, impaired tone, impaired weight shifting and impulsiveness, all affecting her ability to safely and independently perform basic mobility.  Goal for mod I with basic mobility.     4. Impairment of cognition/language/swallow:  Noted to have some possible cognitive deficits.  Would benefit from full cognitive-linguistic evaluation with SLP at ARU with goal for improved cognitive-linguistic skills to meet basic needs.     5. Medical Conditions  New actions/orders/updates for today are in blue.     Subacute left corona radiata stroke  Presented with with right hand incoordination, RUE weakness and possible shuffling gait on RLE which resolved at time of admission.    * MRI brain showed acute to subacute infarct in the left corona radiata.   * MRA neck - Patent arteries in the neck without evidence of dissection.  Apparent atherosclerotic disease of right carotid bifurcation and proximal internal carotid artery which appears to cause approximately 50% stenosis by NASCET criteria  * Loaded with plavix in ED, then started on ASA, Plavix.    * 12/31 AM: Recurrent symptoms of right upper ext ataxia, drift, weakness. Neurology contacted. Repeat CT/CTA head and neck CTA without changes (see report for detail).  * Per neuro, stroke likely due to intracranial atherosclerosis  * TTE-LVEF 65-70%, no thrombus seen in LV  * 1/1 - reporting more weakness in RUE. Neuro evaluated on 1/1 due to flunctuating symptoms, recommended to avoid hypotension, MRI brain w/o if sxs persist.  RUE weakness has been stable since per inpatient team.  - Continue aspirin 81 mg daily  - Continue Plavix 75 mg daily x90 days (started on 12/31)  - PTA Lipitor increased to 80 mg daily, LDL 96  - Outpatient BP <130/80.  Avoid hypotenstion.  Management as below.  - 30-day cardiac monitor at discharge per neurology.  Monitor in place at ARU  admission.  - Stroke PLC completed while inpatient on 1/3  - Continue PT/OT/SLP  - Follow-up with [facility]     Hypertension  BP elevated, started amlodipine 5mg daily on 1/3, increased to 10 mg daily on 1/10.    - Per neurology, avoid rapid decrease in BP and needs gradual titration of medication for optimal BP control with long-term (OP) goal <130/80.  - Continue amlodipine 10 mg daily  - BP in 130s today  - Monitor BP     CAD status post three-vessel CABG  Aortic stenosis s/p aortic valve replacement  - Antiplatelet and statin therapy as above     Hyperlipidemia  PTA statin increased as above due to LDL 96.  - Continue atorvastatin 80 mg daily  - Goal LDL <70     CKD 3  Baseline Cr ~1.  Stable this admission 0.7-0.8.  - Avoid nephrotoxins as able  - Monitor BMP every M/Th.  Cr stable on 1/9 at 0.8     Hyperglycemia w/ prediabetes  Newly identified this admission, A1c 6%  - Goal A1c <7 for secondary stroke prevention  - Follow up with PCP for ongoing management/surveillance       6. Adjustment to disability:  Clinical psychology to eval and treat if indicated  7. FEN: regular diet, thin liquids  8. Bowel: continent, monitor, PRN bowel meds available  9. Bladder: continent, PVRs at admission with no evidence of retention  10. DVT Prophylaxis: mechanical  11. GI Prophylaxis: not indicated  12. Code: full, confirmed at admission  13. Disposition: goal for home  14. ELOS:  1/14/23  15. Follow up Appointments on Discharge: PCP in 1-2 weeks, stroke neurology ([facility]) in 6-8 weeks      Patient seen and discussed with Dr. Matt Slater, PM&R staff physician     Joann Nowak PA-C  Physical Medicine & Rehabilitation

## 2023-01-12 NOTE — PLAN OF CARE
Discharge Planner Post-Acute Rehab PT:     Discharge Plan: Home with OP PT, Family to assist with driving    Precautions: Falls, alarms    Current Status:  Bed Mobility: MOD I  Transfer: MOD in room with cane  Gait: MOD in room with cane, SBA in halls  Stairs: SBA with single rail  Balance: difficulty with motor-motor and cog-motor dual tasking  RUBIO/56 on 23  46/56 on 23  Gait Speed: 0.79 m/sec, 0.88 m/sec on 23.   6MWT on 23 187m (617 ft ), cane , continuous amb     Assessment: Pt with good participation and able to complete 6 MWT and gait speed activities. Pt's gait speed improved since her admit date here.  Pt will participate in I day activities tomorrow.       Other Barriers to Discharge (DME, Family Training, etc):   Family assist for driving  Initial supervision to ensure safety

## 2023-01-12 NOTE — PLAN OF CARE
[id number]  No changes this shift  AOx4. Denies CP, SOB, N/T.  Voiding without difficulty. LBM 1/10/23  Pt denies pain this shift  Call light within reach at all times. Pt able to make needs known. Bed alarm on for safety.  Continue with POC.

## 2023-01-12 NOTE — CARE CONFERENCE
"Acute Rehab Care Conference/Team Rounds      Type: Team Rounds    Present: Matt Slater MD, Joann Nowak PA, Charmaine Gabriel PT, Mathieu Mcgregor OT, Jeannie Duong SLP, Jordana RONDON, Breanne Guthrie RN CC, Monica Lozada RD, Kaleigh Curtis RN, and Eleanor Coats Patient.       Discharge Barriers/Treatment/Education    Rehab Diagnosis: Stroke Ischemic 01.2 (R) Body Involvement (L) Brain; Acute vs subacute left corona radiata stroke    Active Medical Co-morbidities/Prognosis:   Patient Active Problem List   Diagnosis     Cerebrovascular accident (CVA), unspecified mechanism (H)        Safety: Pt is alert and oriented. Uses call light appropriately, able to make needs known. Bed alarm at night only.    Pain: Denies    Medications, Skin, Tubes/Lines: Medications taken whole with water. Completed MAP successfully. Skin intact. No tubes or lines.     Swallowing/Nutrition: Regular/thin (0); not formally assessed on ARU, no concerns.    Bowel/Bladder: Continent of bladder, voiding without difficulty using toilet. Continent of bowel, last BM per patient report was 1/10.    Psychosocial: Lives alone in apartment. Pt has 4 adult children, 3 of which are daughters who live locally and able to assist pt at discharge. 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 No mental health/chem dep concerns at this time.     ADLs/IADLs: Pt progressing well, currently Mod I ADL routine and in room with SEC during the day and SBA at night. Anticipate progression to Mod I day and night shortly d/t improved pt carryover remembering to use mobility device and overall improved balance and R-side function. Pt has demonstrated appropriate performance and balance strategies with simulated light home mgmt tasks. Improved med mgmt with MAP program since implementation of alarm reminders.  Recommend A with cognitive IADLs including med box spot checks, driving, heavy IADLs. Pt on track to discharge home to [facility] with [facility]; recommend initial " "supervision < 1 week from family to ease transition and home set up prn.    Mobility: Significant improvement since last week. Now MOD I with cane, and ready for discharge on Saturday. F.T. was completed this week, and pt has been MOD I in room for 2 days now. Recommending supervision at home for 5-7 days to ensure safe transition.   RUBIO/56 on 23  46/56 on 23  Gait Speed: 0.79 m/sec    Cognition/Language: Language, motor speech WNL. Pt with min-mild cognitive impairments with improvement in awareness of deficits but limited insight into impact on completion of higher level iADLs. Pt with slight improvement in monitoring/correction for slowing speed completing tasks. Pt and family trained in recommendation for alarm reminders for medication doses based on performance on MAP from nursing report, pt stated \"I don't need to use alarms.\"     Community Re-Entry:    Transportation: Not a  - family to provide.    Decision maker: self    Plan of Care and goals reviewed and updated.    Discharge Plan/Recommendations    Fall Precautions: continue    Patient/Family input to goals: Yes    Anticipated rehab needs following discharge: Home with HC    Anticipated care giver support after discharge: Family    Estimated length of stay: 23    Overall plan for the patient: Continue IP Rehabilitation.       Utilization Review and Continued Stay Justification    Medical Necessity Criteria:    For any criteria that is not met, please document reason and plan for discharge, transfer, or modification of plan of care to address.    Requires intensive rehabilitation program to treat functional deficits?: Yes    Requires 3x per week or greater involvement of rehabilitation physician to oversee rehabilitation program?: Yes    Requires rehabilitation nursing interventions?: Yes    Patient is making functional progress?: Yes    There is a potential for additional functional progress? Yes    Patient is participating in " therapy 3 hours per day a minimum of 5 days per week or 15 hours per week in 7 day period?:Yes    Has discharge needs that require coordinated discharge planning approach?:Yes          Final Physician Sign off    Statement of Approval: I approve the plan of care.     Patient Goals      Social Work Goals:  Confirm discharge recommendations with therapy, coordinate safe discharge plan and remain available to support and assist as needed.        OT Predicted Duration/Target Date for Goal Attainment: 01/14/23  Therapy Frequency (OT): Daily  OT: Hygiene/Grooming: modified independent  OT: Upper Body Dressing: Modified independent  OT: Lower Body Dressing: Modified independent  OT: Upper Body Bathing: Modified independent  OT: Lower Body Bathing: Supervision/stand-by assist  OT: Bed Mobility: Modified independent  OT: Transfer: Supervision/stand-by assist (shower tranfer)  OT: Toilet Transfer/Toileting: Modified independent  OT: Meal Preparation: Supervision/stand-by assist  OT: Home Management: Supervision/stand-by assist  OT: Cognitive: Patient/caregiver will verbalize understanding of cognitive assessment results/recommendations as needed for safe discharge planning                                   PT Frequency: Daily  PT: Bed Mobility: Supine to/from sit, Independent  PT: Transfers: Independent, Sit to/from stand, Bed to/from chair  PT: Gait: Modified independent, Rolling walker, Greater than 200 feet   PT: Goal 1: Car transfer into family vehicle, SBA    SLP Predicted Duration/Target Date for Goal Attainment: 01/13/23  Therapy Frequency (SLP Eval): daily  SLP: Goal 1: Patient will complete high complexity reasoning/problem solving tasks with 90% accuracy and minimal cues from SLP.  SLP: Goal 2: Patient will recall new, complex information with use of trained memory strategies and no cues from SLP.                     RN Goal: Medical Management: Patient will verbalize 3 ways to maintain blood pressure prior to  discharge.       RN Goal: Medication Management: Patient will pass MAP and demonstrate knowlege of current medications prior to discharge.          RN Goal: Safety Management: Patient will use call light appropriately and remain free of falls during ARU stay.

## 2023-01-12 NOTE — PROGRESS NOTES
Discharge Planner Post-Acute Rehab OT:      Discharge Plan: ILF with HCOT  Family assist driving and heavy IADLs.    Recommend initial family assist in home to assist transition < 1 week.     Precautions: falls, alarms     Current Status:  ADLs:    Mobility: Mod IND with cane during day and SBA at night    Grooming: IND    Dressing: Mod IND with SEC    Bathing: Transfer SBA cane, grab bar, ETB. Bathing SBA with A for item mgmt, verbal cues. Simulated WIS transfer for home SEC SBA.    Toileting: Mod IND with SEC  IADLs: IND PTA, with family responsible financial mgmt. Recommend A with transportation, heavy/community-based ADLs, and med set up at discharge. Stovetop meal prep SBA with cane, vc 1x safety. Initiated MAP 1/8. SBA pet mgmt and laundry simulation.  Vision/Cognition: Glasses. Deficits in memory, R attention, inhibition, flexible thinking, and higher level EF noted functionally.      Assessment: Pt seen for interdiciplinary rounds, see POC note for details. Pt on track to discharge 1/14/23. Plan for independence day ADLs tomorrow.     Other Barriers to Discharge (DME, Family Training, etc): safe discharge plan - family unable to confirm 24/7 A initially reported at admission. Pt lives alone in ILF - Walk in shower, grab bars; standard toilet.     DME:   - shower seat: Conflicting reports whether pt owns. Provided recco for shower seat at FT and handout for shower stool per pt preference 1/12.  - hurry cane: daughter ordered  - foot up: stock     Family training completed Monday 3-4pm with daughter Prachi, son Shaun, and ISAEL Ruiz.

## 2023-01-12 NOTE — PLAN OF CARE
Discharge Planner Post-Acute Rehab SLP:     Discharge Plan: home with HH PT/OT initially, recommend OP SLP upon completion of home health    Precautions: None from SLP perspective    Current Status:  Hearing: WNL  Vision: Reading glasses  Communication: WNL  Cognition: Mild cognitive impairment; mild reasoning and min-mild memory  Swallow: Regular/thin (0); not evaluated on ARU, no concerns    Assessment: Instructed pt in FAVRES task #2, scheduling. Pt required mild cues to complete task following all time constraints and parameters- cues for attention to detail, noticing omission of tasks and duplication of tasks.    Other Barriers to Discharge (Family Training, etc): family training in strategies/support to increase independence- completed 01/09/23

## 2023-01-12 NOTE — PROGRESS NOTES
6 Minute Walk Test    Setup:  40  meter walk way setup in hallway between PT and OT gyms designated by orange cones at each end and distance included in turns. Seats setup at end of walkway for rest break prn.  Cane used     Distance Ambulated: (617 ft,) 187m    Total Rest Time During 6MWT:0     Vitals -  Max HR: 100  Max Desaturation: 96    Assessment: Pt is s/p stroke, with R hemiparesis, with impaired balance, R side strength and coordination.  Pt is making gradual progress with her activity tolerance, and balance. Pt's gait is slower than community dwelling adults  In her age range.     Age Related Norms in Community Dwelling Adults:  Age  Male   Female  60-69 yrs 572 m (1864 ft) 538 m (1753 ft)  70-79 yrs 527 m (1718 ft) 471 m (1535 ft)  80-89 yrs 417 m (1359 ft) 392 m (1278 ft)    (Jaswinder et al, 2002; n = 96; community dwelling elderly people with independent function who were nonsmokers with no history of dizziness; > 61 yo and did not use assistive devices, Community-dwelling Elderly)    Chronic Heart Failure:  Average distance walked: 310-427 meters (9853-5760 ft)depending on the severity of heart disease  Ranging from 502-743 meters (3749-2450 ft) depending on age, sex, height, weight, predicted heart rate max  Average distance of 238-388 meters for subjects with COPD  (Puja et al, 2009, Chronic Heart Failure)     COPD:  Average distance walked: 380 m, range 160-600 m (1238 ft, range 521-1956 ft)  Distance < 200 m is predictive of hospitalization or mortality  Significant decline in 6MWD demonstrated in healthy adults as age increases  Geographic variations also noted in 6MWD  (Hemal et al, 2007; Lizzy et al, 1997, COPD)

## 2023-01-12 NOTE — PLAN OF CARE
Goal Outcome Evaluation:      Plan of Care Reviewed With: patient    Overall Patient Progress: no change    Outcome Evaluation: No change in patient progress this shift.    Orientation: A/Ox4  Bowel: LBM 1/10 per patient report  Bladder: Continent of bladder using toilet in bathroom  Pain: Denies pain  Ambulation/Transfers: Mod I with cane during the day, SBA at night  Diet/ Liquids: Tolerating diet well with good appetite  Bed alarm no longer indicated, call light within reach. Continue with POC.

## 2023-01-12 NOTE — PLAN OF CARE
FOCUS/GOAL  Bladder management, Pain management, Mobility, Cognition/Memory/Judgment/Problem solving, and Safety management    ASSESSMENT, INTERVENTIONS AND CONTINUING PLAN FOR GOAL:  Pt is alert and oriented. Continent of bladder, voiding without difficulty using toilet. Up Mod I days/evenings, SBA at night. Denied pain or discomfort overnight. Appeared to be sleeping well during rounds. Uses call light appropriately, able to make needs known. Bed alarm on at night for safety.

## 2023-01-12 NOTE — PLAN OF CARE
Goal Outcome Evaluation: Pt was on medication program.  She successfully met this goal & completed the medication program. Pt pleasant & cooperative with all cares. No c/o pain this shift.

## 2023-01-12 NOTE — DISCHARGE SUMMARY
"    [facility]   Acute Rehabilitation Unit  Discharge summary     Date of Admission: 1/3/2023  Date of Discharge: 1/14/2023  Disposition: home with home care nursing and therapies  Primary Care Physician: Joe Ramires  Attending physician: Edgard Bryant DO      DISCHARGE DIAGNOSIS      Subacute left corona radiata stroke     Hypertension     Hyperlipidemia    Hx CAD s/p CABG x3    Hx aortic stenosis s/p aortic valve replacement    CKD 3    Prediabetes       BRIEF SUMMARY  Inga Coats is a 82 year old right hand dominant female with a past medical history of CAD s/p CABG x3, aortic stenosis s/p AVR, TIA, hyperlipidemia, and CKD 3 who was admitted on 12/30/22 with subacute left corona radiata stroke with hospital course complicated by elevated BP, new prediabetes diagnosis, and fluctuating stroke symptoms.  She was admitted to ARU on 1/3/23 for multidisciplinary rehabilitation and ongoing medical management.    Rehab and medical course as below.    REHABILITATION COURSE  PT:   \"Therapy recommendation(s):    Continued therapy is recommended.  Rationale/Recommendations:  Pt is now MOD I with cane and R foot up brace. She is safe to d/c to home, but will benefit from ongoing PT intervention to ensure safe transition to home, and optimize safety in community. Pt has goals to return to driving and living fully INDly.\"    OT:   \"Therapy recommendation(s):    Continued therapy is recommended.  Rationale/Recommendations:  Performance continues to be limited by mild R-side weakness and incoordination, balance deficits, fatigue, and functional cognitive deficits impairing new learning and problem solving novel situations within familiar tasks. Pt will benefit from skilled occcupaitonal therapy to promote independence and safety in IADL tasks, home safety and fall prevention assessment, RUE strength and coordination, and functional cognition.\"    SLP:  \"Therapy " "recommendation(s):    Continued therapy is recommended.  Rationale/Recommendations:  OP SLP for higher level cognitive tasks.   Pt made good progress towards goals for problem solving and memory, improved self monitoring/correction for slowing down when completing tasks, continues to require cues for attention to detail.\"    MEDICAL COURSE    Subacute left corona radiata stroke  Presented with with right hand incoordination, RUE weakness and possible shuffling gait on RLE which resolved at time of admission.    * MRI brain showed acute to subacute infarct in the left corona radiata.   * MRA neck - Patent arteries in the neck without evidence of dissection.  Apparent atherosclerotic disease of right carotid bifurcation and proximal internal carotid artery which appears to cause approximately 50% stenosis by NASCET criteria  * Loaded with plavix in ED, then started on ASA, Plavix.    * 12/31 AM: Recurrent symptoms of right upper ext ataxia, drift, weakness. Neurology contacted. Repeat CT/CTA head and neck CTA without changes (see report for detail).  * Per neuro, stroke likely due to intracranial atherosclerosis  * TTE-LVEF 65-70%, no thrombus seen in LV  * 1/1 - reporting more weakness in RUE. Neuro evaluated on 1/1 due to flunctuating symptoms, recommended to avoid hypotension, MRI brain w/o if sxs persist.  RUE weakness has been stable since per inpatient team.  - Continue aspirin 81 mg daily  - Continue Plavix 75 mg daily x90 days (started on 12/31)  - PTA Lipitor increased to 80 mg daily, LDL 96  - Outpatient BP <130/80.  Avoid hypotenstion.  Management as below.  - 30-day cardiac monitor at discharge per neurology.  Monitor in place at ARU admission.  - Stroke PLC completed while inpatient on 1/3  - Continue PT/OT/SLP  - Follow-up with [facility]rology     Hypertension  BP elevated, started amlodipine 5mg daily on 1/3, increased to 10 mg daily on 1/10.    - Per neurology, avoid rapid decrease " in BP and needs gradual titration of medication for optimal BP control with long-term (OP) goal <130/80.  - Continue amlodipine 10 mg daily  - Monitor BP daily at home and follow-up with PCP for ongoing management     CAD status post three-vessel CABG  Aortic stenosis s/p aortic valve replacement  - Antiplatelet and statin therapy as above     Hyperlipidemia  PTA statin increased as above due to LDL 96.  - Continue atorvastatin 80 mg daily  - Goal LDL <70     CKD 3  Baseline Cr ~1.  Stable this admission 0.7-0.8.  - Follow up with PCP, repeat BMP as indicated.     Hyperglycemia w/ prediabetes  Newly identified this admission, A1c 6%  - Goal A1c <7 for secondary stroke prevention  - Follow up with PCP for ongoing management/surveillance    DISCHARGE MEDICATIONS  Discharge Medication List as of 1/14/2023  9:45 AM      START taking these medications    Details   acetaminophen (TYLENOL) 325 MG tablet Take 1-2 tablets (325-650 mg) by mouth every 4 hours as needed for mild pain, OTC         CONTINUE these medications which have CHANGED    Details   amLODIPine (NORVASC) 10 MG tablet Take 1 tablet (10 mg) by mouth daily, Disp-30 tablet, R-0, E-Prescribe      aspirin (ASA) 81 MG chewable tablet Take 1 tablet (81 mg) by mouth daily, Disp-30 tablet, R-0, E-Prescribe      atorvastatin (LIPITOR) 80 MG tablet Take 1 tablet (80 mg) by mouth every evening, Disp-30 tablet, R-0, E-Prescribe      clopidogrel (PLAVIX) 75 MG tablet Take 1 tablet (75 mg) by mouth daily for 75 days, Disp-75 tablet, R-0, E-Prescribe         CONTINUE these medications which have NOT CHANGED    Details   Apoaequorin (PREVAGEN) 10 MG CAPS Take 1 capsule by mouth daily, Historical      Misc Natural Products (OSTEO BI-FLEX ADV TRIPLE ST PO) Take 2 capsules by mouth daily, Historical      multivitamin  with lutein (OCUVITE WITH LUTEIN) CAPS per capsule Take 1 capsule by mouth daily, Historical      multivitamin (CENTRUM SILVER) tablet Take 1 tablet by mouth  daily, Historical      vitamin D3 (CHOLECALCIFEROL) 50 mcg (2000 units) tablet Take 1 tablet by mouth daily, Historical               DISCHARGE INSTRUCTIONS AND FOLLOW UP  Discharge Procedure Orders   Home Care Referral   Referral Priority: Routine: Next available opening Referral Type: Home Health Therapies & Aides   Number of Visits Requested: 1     Reason for your hospital stay   Order Comments: You were admitted for rehabilitation following a stroke.     Activity   Order Comments: Your activity upon discharge: activity as tolerated and as directed by therapy team.  We recommend ongoing use of cane for mobility.  You would benefit from supervision/assist initially with higher level activities of daily living such as finances, appointments, complex decision-making, community access, transportation, heavier activities.  We have referred you to home care to continue to progress function and independence.  We advise against driving at this time.     Order Specific Question Answer Comments   Is discharge order? Yes      Adult [facility] Follow-up and recommended labs and tests   Order Comments: Follow up with primary care provider, Joe Ramires, within 7-14 days for hospital follow- up.  No follow up labs or test are needed.      Follow up with stroke neurology ([facility]) in 6-8 weeks.    Appointments on [place] and/or [facility] (with [facility] or [facility] provider or service). Call [phone] if you haven't heard regarding these appointments within 7 days of discharge.     Monitor and record   Order Comments: blood pressure daily and bring record to next appointment     Diet   Order Comments: Follow this diet upon discharge: Regular Diet; Thin Liquids (level 0)     Order Specific Question Answer Comments   Is discharge order? Yes           PHYSICAL EXAMINATION    Most recent Vital Signs:   Vitals:    01/12/23 1542 01/13/23 0719 01/13/23 1544 01/14/23 0857   BP: 135/55 133/50 (!)  141/58 (!) 146/63   BP Location: Left arm Left arm Left arm Left arm   Patient Position:    Sitting   Cuff Size:    Adult Regular   Pulse: 79 73 74 74   Resp: 18 20 20 18   Temp: 96.9  F (36.1  C) (!) 96.2  F (35.7  C) 98.6  F (37  C) 97.1  F (36.2  C)   TempSrc: Oral Oral Oral Oral   SpO2: 94% 94% 94% 96%   Weight:       Height:         Gen: NAD, seated upright in chair  HEENT: NC/AT, MMM  Cardio: RRR, no murmurs  Pulm: non-labored on room air, lungs CTA bilaterally  Abd: soft, non-tender, non-distended, bowel sounds present  Ext: no appreciable edema in bilateral lower extremities  Neuro/MSK: AAO, speech clear/fluent, PERRL, EOMI, face symmetric, RUE SF 4/5, otherwise 4+/5 throughout.  RLE HF 4+/5 otherwise 5/5 throughout. Sensation intact to light touch in all 4 extremities.      I, Martina Lopes MD, saw and evaluated this patient prior to discharge. I discussed the patient with the resident and agree with the plan of care as documented in the resident's note. I personally reviewed the chart (vitals signs, medications, labs and imaging), and spent 15 minutes on discharge activities.

## 2023-01-13 ENCOUNTER — APPOINTMENT (OUTPATIENT)
Dept: SPEECH THERAPY | Facility: CLINIC | Age: 83
DRG: 057 | End: 2023-01-13
Attending: PHYSICAL MEDICINE & REHABILITATION
Payer: COMMERCIAL

## 2023-01-13 ENCOUNTER — APPOINTMENT (OUTPATIENT)
Dept: PHYSICAL THERAPY | Facility: CLINIC | Age: 83
DRG: 057 | End: 2023-01-13
Attending: PHYSICAL MEDICINE & REHABILITATION
Payer: COMMERCIAL

## 2023-01-13 ENCOUNTER — APPOINTMENT (OUTPATIENT)
Dept: OCCUPATIONAL THERAPY | Facility: CLINIC | Age: 83
DRG: 057 | End: 2023-01-13
Attending: PHYSICAL MEDICINE & REHABILITATION
Payer: COMMERCIAL

## 2023-01-13 PROCEDURE — 97130 THER IVNTJ EA ADDL 15 MIN: CPT | Mod: GN | Performed by: SPEECH-LANGUAGE PATHOLOGIST

## 2023-01-13 PROCEDURE — 250N000013 HC RX MED GY IP 250 OP 250 PS 637: Performed by: PHYSICAL MEDICINE & REHABILITATION

## 2023-01-13 PROCEDURE — 97116 GAIT TRAINING THERAPY: CPT | Mod: GP

## 2023-01-13 PROCEDURE — 99232 SBSQ HOSP IP/OBS MODERATE 35: CPT | Mod: FS | Performed by: PHYSICIAN ASSISTANT

## 2023-01-13 PROCEDURE — 97750 PHYSICAL PERFORMANCE TEST: CPT | Mod: GP

## 2023-01-13 PROCEDURE — 97530 THERAPEUTIC ACTIVITIES: CPT | Mod: GP

## 2023-01-13 PROCEDURE — 250N000013 HC RX MED GY IP 250 OP 250 PS 637: Performed by: PHYSICIAN ASSISTANT

## 2023-01-13 PROCEDURE — 128N000003 HC R&B REHAB

## 2023-01-13 PROCEDURE — 97535 SELF CARE MNGMENT TRAINING: CPT | Mod: GO

## 2023-01-13 PROCEDURE — 97129 THER IVNTJ 1ST 15 MIN: CPT | Mod: GN | Performed by: SPEECH-LANGUAGE PATHOLOGIST

## 2023-01-13 RX ADMIN — AMLODIPINE BESYLATE 10 MG: 5 TABLET ORAL at 08:00

## 2023-01-13 RX ADMIN — Medication 50 MCG: at 08:00

## 2023-01-13 RX ADMIN — Medication 1 CAPSULE: at 08:00

## 2023-01-13 RX ADMIN — CLOPIDOGREL BISULFATE 75 MG: 75 TABLET, FILM COATED ORAL at 08:00

## 2023-01-13 RX ADMIN — ASPIRIN 81 MG CHEWABLE TABLET 81 MG: 81 TABLET CHEWABLE at 08:00

## 2023-01-13 RX ADMIN — ATORVASTATIN CALCIUM 80 MG: 80 TABLET, FILM COATED ORAL at 19:33

## 2023-01-13 ASSESSMENT — ACTIVITIES OF DAILY LIVING (ADL)
ADLS_ACUITY_SCORE: 34
ADLS_ACUITY_SCORE: 33
ADLS_ACUITY_SCORE: 34
ADLS_ACUITY_SCORE: 33
ADLS_ACUITY_SCORE: 33
ADLS_ACUITY_SCORE: 34
ADLS_ACUITY_SCORE: 34

## 2023-01-13 NOTE — PLAN OF CARE
Goal Outcome Evaluation:      Plan of Care Reviewed With: patient          Outcome Evaluation: Pt's vitals stable. Denied pain. Mod I in room with walker. Pt managed dressing, grooming and toileting independently. Continent of bowel and bladder. Good appetite. Ate 100% of meals. Getting ready to discharge tomorrow.

## 2023-01-13 NOTE — PROGRESS NOTES
"[facility] Pain Assessment    Pain Effect on Sleep  \"Over the past 5 days, how much of the time has pain made it hard for you to sleep at night?\"    1. Rarely or not at all     Pain Interference with Therapy Activities  \"Over the past 5 days, how often have you limited your participation in rehabilitation therapy sessions due to pain?\"   1. Rarely or not at all    Pain Interference with Day-to-Day Activities  \"Over the past 5 days, how often have you limited your day-to-day activities (excluding rehabilitation therapy sessions) because of pain?\"   1. Rarely or not at all     ABIODUN King   [facility] Transitional Care Unit   Social Work   [facility] [place] [address], [address] [place] [address] () [phone]      "

## 2023-01-13 NOTE — PROGRESS NOTES
Occupational Therapy Discharge Summary    Reason for therapy discharge:    Discharged to home with home therapy. Family to supervise ~1 day to ease transition and A driving and heavy IADLs.    Progress towards therapy goal(s). See goals on Care Plan in [facility] electronic health record for goal details.  Goals met     Pt initially presented with R-side weakness, impulsivity, and cognitive deficits requiring A for mobility and ADLs. Pt progressed to Mod I with cane for ADLs. Simulated simple cooking, home mgmt and pet mgmt tasks SBA.  Pt educated on recommended driving hold.    Therapy recommendation(s):    Continued therapy is recommended.  Rationale/Recommendations:  Performance continues to be limited by mild R-side weakness and incoordination, balance deficits, fatigue, and functional cognitive deficits impairing new learning and problem solving novel situations within familiar tasks. Pt will benefit from skilled occcupaitonal therapy to promote independence and safety in IADL tasks, home safety and fall prevention assessment, RUE strength and coordination, and functional cognition. .

## 2023-01-13 NOTE — PLAN OF CARE
"Discharge Planner Post-Acute Rehab SLP:     Discharge Plan: home with HH PT/OT initially, recommend OP SLP upon completion of home health    Precautions: None from SLP perspective    Current Status:  Hearing: WNL  Vision: Reading glasses  Communication: WNL  Cognition: Mild cognitive impairment; mild reasoning and min-mild memory  Swallow: Regular/thin (0); not evaluated on ARU, no concerns    Assessment: Instructed pt in high complexity deductive reasoning task with multiple solutions, pt completed with mild cues for attention to details regarding placement of shops, prior parameters to maintain when changing previously made placement. Facilitated discussion regarding possible activities she may be interesting in completing at home to promote cognitive function (iPad, pen/paper, both), pt reported she is open to \"whatever\" to help her get better. SLP to compile activities to send home, list of possible iPhone apps pt could download to complete at home and edu next/last session.    Other Barriers to Discharge (Family Training, etc): family training in strategies/support to increase independence- completed 01/09/23      "

## 2023-01-13 NOTE — PLAN OF CARE
FOCUS/GOAL  Bladder management, Pain management, Mobility, Cognition/Memory/Judgment/Problem solving, and Safety management    ASSESSMENT, INTERVENTIONS AND CONTINUING PLAN FOR GOAL:  Pt is alert and oriented. Continent of bladder, voiding spontaneously using toilet. Up SBA at night. Denied pain or discomfort overnight. Appeared to be sleeping well during rounds. Uses call light appropriately, able to make needs known. Bed alarm on at night.

## 2023-01-13 NOTE — PROGRESS NOTES
Speech Language Therapy Discharge Summary    Reason for therapy discharge:    Discharged to home with outpatient therapy.    Progress towards therapy goal(s). See goals on Care Plan in Epic electronic health record for goal details.  Goals partially met.  Barriers to achieving goals:   discharge from facility.    Therapy recommendation(s):    Continued therapy is recommended.  Rationale/Recommendations:  OP SLP for higher level cognitive tasks.   Pt made good progress towards goals for problem solving and memory, improved self monitoring/correction for slowing down when completing tasks, continues to require cues for attention to detail.

## 2023-01-13 NOTE — PROGRESS NOTES
"  [facility]   Acute Rehabilitation Unit  Daily progress note    INTERVAL HISTORY  Inga Coats was seen at bedside this morning.  No acute events reported overnight.  She reports that she is feeling well overall, looking forward to and feeling prepared for discharge home tomorrow.  Reviewed follow-up recommendations, BP monitoring, rehab, etc.  Continues to deny any chest pain, palpitations, shortness of breath, dizziness, nausea, bowel or bladder concerns.    Functionally, she is mod I in room with cane, SBA for stairs with single rail.  She is independent for grooming, mod I for dressing, SBA for bathing, and mod I for toileting.      MEDICATIONS    amLODIPine  10 mg Oral Daily     aspirin  81 mg Oral Daily     atorvastatin  80 mg Oral QPM     clopidogrel  75 mg Oral Daily     multivitamin  with lutein  1 capsule Oral Daily     vitamin D3  50 mcg Oral Daily        acetaminophen, polyethylene glycol, senna-docusate     PHYSICAL EXAM  /55 (BP Location: Left arm)   Pulse 79   Temp 96.9  F (36.1  C) (Oral)   Resp 18   Ht 1.638 m (5' 4.5\")   Wt 63.4 kg (139 lb 12.8 oz)   SpO2 94%   BMI 23.63 kg/m      Gen: NAD, seated upright in chair  HEENT: NC/AT, MMM  Cardio: RRR, no murmurs, cardiac monitor L upper chest  Pulm: non-labored on room air, lungs CTA bilaterally  Abd: soft, non-tender, non-distended, bowel sounds present  Ext: no appreciable edema in bilateral lower extremities  Neuro/MSK: AAO, speech clear/fluent, PERRL, EOMI, face symmetric, RUE SF 4/5, otherwise 4+/5 throughout.  RLE HF 4+/5 otherwise 5/5 throughout.  Mildly slowed, less coordinated finger tapping on right as compared to left.  Sensation intact to light touch in all 4 extremities.    LABS  CBC RESULTS:   Recent Labs   Lab Test 01/09/23  0551 01/05/23  1127 12/31/22  0710   WBC 5.8 7.4 6.4   RBC 4.49 4.56 4.43   HGB 12.9 13.2 12.6   HCT 39.0 38.6 37.7   MCV 87 85 85   MCH 28.7 28.9 28.4   MCHC " 33.1 34.2 33.4   RDW 13.5 13.6 14.2    334 288     Last Basic Metabolic Panel:  Recent Labs   Lab Test 01/09/23  0551 01/05/23  1127 01/02/23  1048    133 135   POTASSIUM 4.2 4.6 4.4   CHLORIDE 106 101 105   CO2 28 23 25   ANIONGAP 3 9 5   GLC 99 96 95   BUN 15 22 17   CR 0.80 0.74 0.74   GFRESTIMATED 72 79 79   JAC 9.5 9.9 9.1       Rehabilitation - continue comprehensive acute inpatient rehabilitation program with multidisciplinary approach including therapies, rehab nursing, and physiatry following. See interval history for updates.      ASSESSMENT AND PLAN  Inga Coats is a 82 year old right hand dominant female with a past medical history of CAD s/p CABG x3, aortic stenosis s/p AVR, TIA, hyperlipidemia, and CKD 3 who was admitted on 12/30/22 with subacute left corona radiata stroke with hospital course complicated by elevated BP, new prediabetes diagnosis, and fluctuating stroke symptoms.  She is now admitted to ARU on 1/3/23 for multidisciplinary rehabilitation and ongoing medical management.        Admission to acute inpatient rehab 01/03/23.    Impairment group code: Stroke Ischemic 01.2 (R) Body Involvement (L) Brain; Acute vs subacute left corona radiata stroke        1. PT, OT and SLP 60 minutes of each on a daily basis, in addition to rehab nursing and close management of physiatrist.       2. Impairment of ADL's: Noted to have impaired activity tolerance, impaired balance, impaired cognition, impaired coordination, impaired judgement and safety awareness, impaired strength, impaired tone, impaired weight shifting and impulsiveness, all affecting her ability to safely and independently perform basic ADLs.  Goal for mod I with basic ADLs.     3. Impairment of mobility:  Noted to have impaired activity tolerance, impaired balance, impaired cognition, impaired coordination, impaired judgement and safety awareness, impaired strength, impaired tone, impaired weight shifting and  impulsiveness, all affecting her ability to safely and independently perform basic mobility.  Goal for mod I with basic mobility.     4. Impairment of cognition/language/swallow:  Noted to have some possible cognitive deficits.  Would benefit from full cognitive-linguistic evaluation with SLP at ARU with goal for improved cognitive-linguistic skills to meet basic needs.     5. Medical Conditions  New actions/orders/updates for today are in blue.     Subacute left corona radiata stroke  Presented with with right hand incoordination, RUE weakness and possible shuffling gait on RLE which resolved at time of admission.    * MRI brain showed acute to subacute infarct in the left corona radiata.   * MRA neck - Patent arteries in the neck without evidence of dissection.  Apparent atherosclerotic disease of right carotid bifurcation and proximal internal carotid artery which appears to cause approximately 50% stenosis by NASCET criteria  * Loaded with plavix in ED, then started on ASA, Plavix.    * 12/31 AM: Recurrent symptoms of right upper ext ataxia, drift, weakness. Neurology contacted. Repeat CT/CTA head and neck CTA without changes (see report for detail).  * Per neuro, stroke likely due to intracranial atherosclerosis  * TTE-LVEF 65-70%, no thrombus seen in LV  * 1/1 - reporting more weakness in RUE. Neuro evaluated on 1/1 due to flunctuating symptoms, recommended to avoid hypotension, MRI brain w/o if sxs persist.  RUE weakness has been stable since per inpatient team.  - Continue aspirin 81 mg daily  - Continue Plavix 75 mg daily x90 days (started on 12/31)  - PTA Lipitor increased to 80 mg daily, LDL 96  - Outpatient BP <130/80.  Avoid hypotenstion.  Management as below.  - 30-day cardiac monitor at discharge per neurology.  Monitor in place at ARU admission.  - Stroke PLC completed while inpatient on 1/3  - Continue PT/OT/SLP  - Follow-up with [facility]     Hypertension  BP elevated, started  amlodipine 5mg daily on 1/3, increased to 10 mg daily on 1/10.    - Per neurology, avoid rapid decrease in BP and needs gradual titration of medication for optimal BP control with long-term (OP) goal <130/80.  - Continue amlodipine 10 mg daily  - BP improved in 130s today.  Discussed BP goals, monitoring at home for discharge with patient today.  - Monitor BP     CAD status post three-vessel CABG  Aortic stenosis s/p aortic valve replacement  - Antiplatelet and statin therapy as above     Hyperlipidemia  PTA statin increased as above due to LDL 96.  - Continue atorvastatin 80 mg daily  - Goal LDL <70     CKD 3  Baseline Cr ~1.  Stable this admission 0.7-0.8.  - Avoid nephrotoxins as able  - Monitor BMP every M/Th.  Cr stable on 1/9 at 0.8     Hyperglycemia w/ prediabetes  Newly identified this admission, A1c 6%  - Goal A1c <7 for secondary stroke prevention  - Follow up with PCP for ongoing management/surveillance       6. Adjustment to disability:  Clinical psychology to eval and treat if indicated  7. FEN: regular diet, thin liquids  8. Bowel: continent, monitor, PRN bowel meds available  9. Bladder: continent, PVRs at admission with no evidence of retention  10. DVT Prophylaxis: mechanical  11. GI Prophylaxis: not indicated  12. Code: full, confirmed at admission  13. Disposition: goal for home  14. ELOS:  1/14/23  15. Follow up Appointments on Discharge: PCP in 1-2 weeks, stroke neurology ([facility]) in 6-8 weeks        Joann Nowak PA-C  Physical Medicine & Rehabilitation

## 2023-01-13 NOTE — PROGRESS NOTES
The Activities-specific Balance Confidence (ABC) Scale:  *[place] GARCIA & Essence KUO. The Activities-specific Balance Confidence (ABC) Scale. Journal of Gerontology Med Sci 1995; 50(1):M28-34.    Instructions to Participants: For each of the following activities, please indicate your level of confidence in doing the activity without losing your balance or becoming unsteady from choosing one of the percentage points on the scale from 0% to 100%.    If you do not currently do the activity in question, try and imagine how confident you would be if you had to do the activity. If you normally use a walking aid to do the activity or hold onto someone, rate your confidence as if you were using these supports.     (No Confidence) 0% <----> 100% (Completely Confident)    How confident are you that you will not lose your balance or become unsteady when you   1.  walk around the house? 100%  2.  walk up or down stairs? 30%  3.  bend over and  a slipper from the front of a closet floor? 90%  4.  reach for a small can off a shelf at eye level? 100%  5.  stand on your tip toes and reach for something above your head? 90%  6.  stand on a chair and reach for something? 30%  7.  sweep the floor? 100%  8.  walk outside the house to a car parked in the driveway? 100%  9.  get into or out of a car? 100%  10.  walk across a parking lot to the mall? 80%  11.  walk up or down a ramp? 90%  12.  walk in a crowded mall where people rapidly walk past you? 95%  13.  are bumped into by people as you walk through the mall? 80%  14.  step onto or off of an escalator while you are holding onto a railing? 60%  15.  step onto or off an escalator while holding onto parcels such that you cannot hold onto the railing? 25%  16.  walk outside on icy sidewalks? 15%    Total ABC Score = 74%    Scoring: Total ABC Score / 16 = 74 % of Self Confidence    2 weeks post-stroke. Indicates low risk for falls and adequate safety judgment.

## 2023-01-13 NOTE — PROGRESS NOTES
CLINICAL NUTRITION SERVICES - BRIEF NOTE  Brief chart review completed for LOS policy. Pt is on a regular diet taking 100% of meals. MAR reviewed. Labs reviewed. Weight appears stable from acute trend and from >1 year ago per care everywhere documentation, BMI classified as normal. Pt is planned for discharge tomorrow. If pt were to remain admitted, RD services would fully assess at LOS day 14.    Monica Lozada RD, CNSC, LD  ARU  RD pager: [phone]

## 2023-01-13 NOTE — PLAN OF CARE
FOCUS/GOAL  Bowel management, Bladder management, Pain management, Mobility, Skin integrity, and Safety management    ASSESSMENT, INTERVENTIONS AND CONTINUING PLAN FOR GOAL:  Patient is alert and oriented x 4. Denied pain, headache, dizziness, CP or SOB. Mod I during the day SBA at night. Regular/thin. Continent of B/B last BM 01/11 per patient.  Plan to discharge home on 01/14. No care concern at this time. Call light is in reach alarm is on.  Goal Outcome Evaluation:

## 2023-01-14 VITALS
DIASTOLIC BLOOD PRESSURE: 63 MMHG | BODY MASS INDEX: 23.29 KG/M2 | OXYGEN SATURATION: 96 % | SYSTOLIC BLOOD PRESSURE: 146 MMHG | HEIGHT: 65 IN | WEIGHT: 139.8 LBS | HEART RATE: 74 BPM | TEMPERATURE: 97.1 F | RESPIRATION RATE: 18 BRPM

## 2023-01-14 PROCEDURE — 99238 HOSP IP/OBS DSCHRG MGMT 30/<: CPT | Mod: GC | Performed by: PHYSICAL MEDICINE & REHABILITATION

## 2023-01-14 PROCEDURE — 250N000013 HC RX MED GY IP 250 OP 250 PS 637: Performed by: PHYSICIAN ASSISTANT

## 2023-01-14 RX ADMIN — CLOPIDOGREL BISULFATE 75 MG: 75 TABLET, FILM COATED ORAL at 08:51

## 2023-01-14 RX ADMIN — ASPIRIN 81 MG CHEWABLE TABLET 81 MG: 81 TABLET CHEWABLE at 08:51

## 2023-01-14 RX ADMIN — Medication 1 CAPSULE: at 08:51

## 2023-01-14 RX ADMIN — Medication 50 MCG: at 08:51

## 2023-01-14 ASSESSMENT — ACTIVITIES OF DAILY LIVING (ADL)
ADLS_ACUITY_SCORE: 34

## 2023-01-14 NOTE — PLAN OF CARE
Goal Outcome Evaluation:      Plan of Care Reviewed With: patient      Outcome Evaluation: Pt's vitals stable. BP slightly elevated before scheduled am norvasc. Mod I in room. Managed grooming, dressing and toileting. Continent of bowel and bladder. Daughter came to pick pt up for discharge. Reviewed discharge instructions with pt and daughter. Meds given to them. Discharged from unit per w/c and assisted to car at [facility].

## 2023-01-14 NOTE — PLAN OF CARE
Pt is alert and oriented x4, denies fever, chills, chest pain, SOB, N/V, abdominal pain, or new weakness/numbness/tingling, continent of bowel and bladder, francis ww once up and awake, SBA at night, sleeping well, no tubes, lines or drains, vs stable, no further care concerns at this time continue with POC planning to discharge this morning.  .

## 2023-01-14 NOTE — PLAN OF CARE
A/Ox 4. Able to make needs known. Denies SOB, N/V, and CP. Continent of B/B. LBM 1/13/2023 per pt report. MOD I in room and hallway. No report of pain. Pt is excited about discharge tomorrow. Call light within reach. Continue with POC.

## 2023-01-14 NOTE — PLAN OF CARE
Goal Outcome Evaluation:      Plan of Care Reviewed With: patient    Overall Patient Progress: improvingOverall Patient Progress: improving       Pt denies pain.  No changes. Mod I in the room. No issues or concerns. Pt ready to discharge tomorrow.

## 2023-01-17 ENCOUNTER — PATIENT OUTREACH (OUTPATIENT)
Dept: CARE COORDINATION | Facility: CLINIC | Age: 83
End: 2023-01-17

## 2023-01-17 NOTE — PROGRESS NOTES
Connected Care Resource Center Contact  [facility]/Voicemail     Clinical Data: Transitional Care Management Outreach     Outreach attempted x 2.  Left message on patient's voicemail, providing [facility]'s 24/7 scheduling and nurse triage phone number [phone] ([phone]) for questions/concerns and/or to schedule an appt with an [facility] provider, if they do not have a PCP.      Plan:  [facility] will do no further outreaches at this time.       Alfreda Rosas  Connected Care Resource Center, [facility]    *Connected Care Resource Team does NOT follow patient ongoing. Referrals are identified based on internal discharge reports and the outreach is to ensure patient has an understanding of their discharge instructions.

## 2023-02-20 ENCOUNTER — TELEPHONE (OUTPATIENT)
Dept: NEUROLOGY | Facility: CLINIC | Age: 83
End: 2023-02-20
Payer: COMMERCIAL

## 2023-02-20 NOTE — TELEPHONE ENCOUNTER
----- Message from Kaur Alvarenga MD sent at 2/20/2023  6:37 AM CST -----  This patient's cardiac event monitor showed ventricular tachycardia and SVT.   ----- Message -----  From: Samantha Walker MD  Sent: 2/16/2023   7:33 AM CST  To: Kaur Alvarenga MD

## 2023-02-23 NOTE — TELEPHONE ENCOUNTER
Please route cardiac monitor report to Stroke KRISTEN for review. Recommended neurology follow up with [facility]. Was recently seen by [facility].

## 2023-03-02 NOTE — TELEPHONE ENCOUNTER
"Per Laura NP, \"Per notes, it looks likes primary concern was for intracranial atherosclerotic disease. I think the initial ziopatch is fine and if they want to do another one, that can be determined at follow-up.\"      Vijaya Hutton BS, RN, SCRN  RN Stroke Neurology Care Coordinator  [facility] Service Line    "

## 2023-03-02 NOTE — TELEPHONE ENCOUNTER
"Ziopatch results faxed to Dr. Ramires at [facility] F: [phone]    \"ATTN: Dr. Joe Ramires    REGARDING: Inga Coats  1940    FROM: Vijaya Hutton, RN  RN Stroke Neurology Care Coordinator  [facility]   [phone]      This patient recently completed a ziopatch study as part of recommended stroke workup. It was negative for atrial fibrillation, which is what our team was assessing for. Forwarding results to PCP for review/awareness of PSVT finding if he feels any additional consideration is needed for that.\"        Looks like pt had 2 ziopatch order for a total of 28 days of monitoring, but it appears only 1 was completed. Defer need for additional cardiac monitoring to outside neurology team/PCP?    Did care everywhere update and it appears that pt may be registered to be seen at [facility], but I do not have the ability to see when that appt is if there is one scheduled and there are not any past records available to review from them.       Vijaya Hutton BS, RN, SCRN  RN Stroke Neurology Care Coordinator  [facility] Service Line    "

## 2023-03-02 NOTE — TELEPHONE ENCOUNTER
CArdiologist interpretation is PSVT up to 17 beats. Vijaya could you please ensure pt's PCP is aware of report and if not send it to them? Thank you

## 2023-04-03 ENCOUNTER — APPOINTMENT (OUTPATIENT)
Dept: CT IMAGING | Facility: CLINIC | Age: 83
DRG: 522 | End: 2023-04-03
Attending: EMERGENCY MEDICINE
Payer: COMMERCIAL

## 2023-04-03 ENCOUNTER — HOSPITAL ENCOUNTER (INPATIENT)
Facility: CLINIC | Age: 83
LOS: 4 days | Discharge: NURSING, REHAB OR LONG-TERM CARE | DRG: 522 | End: 2023-04-07
Attending: EMERGENCY MEDICINE | Admitting: STUDENT IN AN ORGANIZED HEALTH CARE EDUCATION/TRAINING PROGRAM
Payer: COMMERCIAL

## 2023-04-03 ENCOUNTER — APPOINTMENT (OUTPATIENT)
Dept: GENERAL RADIOLOGY | Facility: CLINIC | Age: 83
DRG: 522 | End: 2023-04-03
Attending: ORTHOPAEDIC SURGERY
Payer: COMMERCIAL

## 2023-04-03 DIAGNOSIS — I63.9 CEREBROVASCULAR ACCIDENT (CVA), UNSPECIFIED MECHANISM (H): ICD-10-CM

## 2023-04-03 DIAGNOSIS — S72.001A CLOSED FRACTURE OF NECK OF RIGHT FEMUR, INITIAL ENCOUNTER (H): ICD-10-CM

## 2023-04-03 DIAGNOSIS — S09.90XA CLOSED HEAD INJURY, INITIAL ENCOUNTER: ICD-10-CM

## 2023-04-03 LAB
ANION GAP SERPL CALCULATED.3IONS-SCNC: 12 MMOL/L (ref 7–15)
ATRIAL RATE - MUSE: 79 BPM
BASOPHILS # BLD AUTO: 0 10E3/UL (ref 0–0.2)
BASOPHILS NFR BLD AUTO: 0 %
BUN SERPL-MCNC: 26.5 MG/DL (ref 8–23)
CALCIUM SERPL-MCNC: 10.4 MG/DL (ref 8.8–10.2)
CHLORIDE SERPL-SCNC: 97 MMOL/L (ref 98–107)
CREAT SERPL-MCNC: 0.75 MG/DL (ref 0.51–0.95)
DEPRECATED HCO3 PLAS-SCNC: 23 MMOL/L (ref 22–29)
DIASTOLIC BLOOD PRESSURE - MUSE: NORMAL MMHG
EOSINOPHIL # BLD AUTO: 0 10E3/UL (ref 0–0.7)
EOSINOPHIL NFR BLD AUTO: 0 %
ERYTHROCYTE [DISTWIDTH] IN BLOOD BY AUTOMATED COUNT: 13.8 % (ref 10–15)
GFR SERPL CREATININE-BSD FRML MDRD: 78 ML/MIN/1.73M2
GLUCOSE SERPL-MCNC: 171 MG/DL (ref 70–99)
HCT VFR BLD AUTO: 35.2 % (ref 35–47)
HGB BLD-MCNC: 11.9 G/DL (ref 11.7–15.7)
HOLD SPECIMEN: NORMAL
IMM GRANULOCYTES # BLD: 0 10E3/UL
IMM GRANULOCYTES NFR BLD: 0 %
INTERPRETATION ECG - MUSE: NORMAL
LYMPHOCYTES # BLD AUTO: 0.7 10E3/UL (ref 0.8–5.3)
LYMPHOCYTES NFR BLD AUTO: 6 %
MAGNESIUM SERPL-MCNC: 2.3 MG/DL (ref 1.7–2.3)
MCH RBC QN AUTO: 28.1 PG (ref 26.5–33)
MCHC RBC AUTO-ENTMCNC: 33.8 G/DL (ref 31.5–36.5)
MCV RBC AUTO: 83 FL (ref 78–100)
MONOCYTES # BLD AUTO: 0.6 10E3/UL (ref 0–1.3)
MONOCYTES NFR BLD AUTO: 5 %
NEUTROPHILS # BLD AUTO: 10 10E3/UL (ref 1.6–8.3)
NEUTROPHILS NFR BLD AUTO: 89 %
NRBC # BLD AUTO: 0 10E3/UL
NRBC BLD AUTO-RTO: 0 /100
P AXIS - MUSE: 63 DEGREES
PLATELET # BLD AUTO: 304 10E3/UL (ref 150–450)
POTASSIUM SERPL-SCNC: 5.2 MMOL/L (ref 3.4–5.3)
PR INTERVAL - MUSE: 186 MS
QRS DURATION - MUSE: 74 MS
QT - MUSE: 378 MS
QTC - MUSE: 433 MS
R AXIS - MUSE: 22 DEGREES
RBC # BLD AUTO: 4.24 10E6/UL (ref 3.8–5.2)
SODIUM SERPL-SCNC: 132 MMOL/L (ref 136–145)
SYSTOLIC BLOOD PRESSURE - MUSE: NORMAL MMHG
T AXIS - MUSE: 112 DEGREES
TROPONIN T SERPL HS-MCNC: 11 NG/L
TROPONIN T SERPL HS-MCNC: 18 NG/L
VENTRICULAR RATE- MUSE: 79 BPM
WBC # BLD AUTO: 11.2 10E3/UL (ref 4–11)

## 2023-04-03 PROCEDURE — 73502 X-RAY EXAM HIP UNI 2-3 VIEWS: CPT

## 2023-04-03 PROCEDURE — 84484 ASSAY OF TROPONIN QUANT: CPT | Performed by: STUDENT IN AN ORGANIZED HEALTH CARE EDUCATION/TRAINING PROGRAM

## 2023-04-03 PROCEDURE — 99223 1ST HOSP IP/OBS HIGH 75: CPT | Mod: AI | Performed by: STUDENT IN AN ORGANIZED HEALTH CARE EDUCATION/TRAINING PROGRAM

## 2023-04-03 PROCEDURE — 258N000003 HC RX IP 258 OP 636: Performed by: STUDENT IN AN ORGANIZED HEALTH CARE EDUCATION/TRAINING PROGRAM

## 2023-04-03 PROCEDURE — 36415 COLL VENOUS BLD VENIPUNCTURE: CPT | Performed by: STUDENT IN AN ORGANIZED HEALTH CARE EDUCATION/TRAINING PROGRAM

## 2023-04-03 PROCEDURE — 99285 EMERGENCY DEPT VISIT HI MDM: CPT | Mod: 25

## 2023-04-03 PROCEDURE — 85025 COMPLETE CBC W/AUTO DIFF WBC: CPT | Performed by: EMERGENCY MEDICINE

## 2023-04-03 PROCEDURE — 83735 ASSAY OF MAGNESIUM: CPT | Performed by: STUDENT IN AN ORGANIZED HEALTH CARE EDUCATION/TRAINING PROGRAM

## 2023-04-03 PROCEDURE — 250N000011 HC RX IP 250 OP 636: Performed by: EMERGENCY MEDICINE

## 2023-04-03 PROCEDURE — 250N000009 HC RX 250: Performed by: EMERGENCY MEDICINE

## 2023-04-03 PROCEDURE — 258N000003 HC RX IP 258 OP 636: Performed by: EMERGENCY MEDICINE

## 2023-04-03 PROCEDURE — 36415 COLL VENOUS BLD VENIPUNCTURE: CPT | Performed by: EMERGENCY MEDICINE

## 2023-04-03 PROCEDURE — 96375 TX/PRO/DX INJ NEW DRUG ADDON: CPT

## 2023-04-03 PROCEDURE — 96361 HYDRATE IV INFUSION ADD-ON: CPT

## 2023-04-03 PROCEDURE — 80048 BASIC METABOLIC PNL TOTAL CA: CPT | Performed by: EMERGENCY MEDICINE

## 2023-04-03 PROCEDURE — 96376 TX/PRO/DX INJ SAME DRUG ADON: CPT

## 2023-04-03 PROCEDURE — 96374 THER/PROPH/DIAG INJ IV PUSH: CPT

## 2023-04-03 PROCEDURE — 03QR0ZZ REPAIR FACE ARTERY, OPEN APPROACH: ICD-10-PCS | Performed by: EMERGENCY MEDICINE

## 2023-04-03 PROCEDURE — 250N000013 HC RX MED GY IP 250 OP 250 PS 637: Performed by: STUDENT IN AN ORGANIZED HEALTH CARE EDUCATION/TRAINING PROGRAM

## 2023-04-03 PROCEDURE — 93005 ELECTROCARDIOGRAM TRACING: CPT

## 2023-04-03 PROCEDURE — 120N000001 HC R&B MED SURG/OB

## 2023-04-03 PROCEDURE — 84484 ASSAY OF TROPONIN QUANT: CPT | Performed by: EMERGENCY MEDICINE

## 2023-04-03 PROCEDURE — 70450 CT HEAD/BRAIN W/O DYE: CPT

## 2023-04-03 PROCEDURE — 72192 CT PELVIS W/O DYE: CPT

## 2023-04-03 RX ORDER — MORPHINE SULFATE 4 MG/ML
2 INJECTION, SOLUTION INTRAMUSCULAR; INTRAVENOUS
Status: DISCONTINUED | OUTPATIENT
Start: 2023-04-03 | End: 2023-04-04

## 2023-04-03 RX ORDER — MORPHINE SULFATE 4 MG/ML
2 INJECTION, SOLUTION INTRAMUSCULAR; INTRAVENOUS ONCE
Status: COMPLETED | OUTPATIENT
Start: 2023-04-03 | End: 2023-04-03

## 2023-04-03 RX ORDER — ATORVASTATIN CALCIUM 40 MG/1
80 TABLET, FILM COATED ORAL EVERY EVENING
Status: DISCONTINUED | OUTPATIENT
Start: 2023-04-03 | End: 2023-04-07 | Stop reason: HOSPADM

## 2023-04-03 RX ORDER — KETOROLAC TROMETHAMINE 15 MG/ML
15 INJECTION, SOLUTION INTRAMUSCULAR; INTRAVENOUS ONCE
Status: COMPLETED | OUTPATIENT
Start: 2023-04-03 | End: 2023-04-03

## 2023-04-03 RX ORDER — ONDANSETRON 2 MG/ML
4 INJECTION INTRAMUSCULAR; INTRAVENOUS EVERY 6 HOURS PRN
Status: DISCONTINUED | OUTPATIENT
Start: 2023-04-03 | End: 2023-04-07 | Stop reason: HOSPADM

## 2023-04-03 RX ORDER — AMLODIPINE BESYLATE 5 MG/1
5 TABLET ORAL DAILY
Status: DISCONTINUED | OUTPATIENT
Start: 2023-04-04 | End: 2023-04-07 | Stop reason: HOSPADM

## 2023-04-03 RX ORDER — AMLODIPINE BESYLATE 5 MG/1
10 TABLET ORAL DAILY
Status: DISCONTINUED | OUTPATIENT
Start: 2023-04-04 | End: 2023-04-03

## 2023-04-03 RX ORDER — ACETAMINOPHEN 325 MG/1
975 TABLET ORAL EVERY 8 HOURS PRN
Status: DISCONTINUED | OUTPATIENT
Start: 2023-04-03 | End: 2023-04-04

## 2023-04-03 RX ORDER — ONDANSETRON 4 MG/1
4 TABLET, ORALLY DISINTEGRATING ORAL EVERY 6 HOURS PRN
Status: DISCONTINUED | OUTPATIENT
Start: 2023-04-03 | End: 2023-04-07 | Stop reason: HOSPADM

## 2023-04-03 RX ORDER — LIDOCAINE 40 MG/G
CREAM TOPICAL
Status: DISCONTINUED | OUTPATIENT
Start: 2023-04-03 | End: 2023-04-04

## 2023-04-03 RX ORDER — ACETAMINOPHEN 160 MG
100 TABLET,DISINTEGRATING ORAL ONCE
Status: COMPLETED | OUTPATIENT
Start: 2023-04-03 | End: 2023-04-03

## 2023-04-03 RX ORDER — HYDROMORPHONE HYDROCHLORIDE 1 MG/ML
0.2 INJECTION, SOLUTION INTRAMUSCULAR; INTRAVENOUS; SUBCUTANEOUS
Status: DISCONTINUED | OUTPATIENT
Start: 2023-04-03 | End: 2023-04-04

## 2023-04-03 RX ADMIN — SODIUM CHLORIDE 500 ML: 9 INJECTION, SOLUTION INTRAVENOUS at 11:36

## 2023-04-03 RX ADMIN — ATORVASTATIN CALCIUM 80 MG: 40 TABLET, FILM COATED ORAL at 19:56

## 2023-04-03 RX ADMIN — HYDROGEN PEROXIDE 100 ML: 30 LIQUID TOPICAL at 14:29

## 2023-04-03 RX ADMIN — KETOROLAC TROMETHAMINE 15 MG: 15 INJECTION, SOLUTION INTRAMUSCULAR; INTRAVENOUS at 11:37

## 2023-04-03 RX ADMIN — HYDROMORPHONE HYDROCHLORIDE 1 MG: 2 TABLET ORAL at 20:14

## 2023-04-03 RX ADMIN — SODIUM CHLORIDE 1000 ML: 9 INJECTION, SOLUTION INTRAVENOUS at 20:54

## 2023-04-03 RX ADMIN — ACETAMINOPHEN 975 MG: 325 TABLET, FILM COATED ORAL at 20:14

## 2023-04-03 RX ADMIN — MORPHINE SULFATE 2 MG: 4 INJECTION, SOLUTION INTRAMUSCULAR; INTRAVENOUS at 11:37

## 2023-04-03 RX ADMIN — MORPHINE SULFATE 2 MG: 4 INJECTION INTRAVENOUS at 13:26

## 2023-04-03 ASSESSMENT — ACTIVITIES OF DAILY LIVING (ADL)
ADLS_ACUITY_SCORE: 37

## 2023-04-03 ASSESSMENT — ENCOUNTER SYMPTOMS
ARTHRALGIAS: 1
ABDOMINAL PAIN: 0
WOUND: 1

## 2023-04-03 NOTE — ED TRIAGE NOTES
Pt BIBA from home. Patient reports 2 falls since yesterday. Pt has laceration to head. Patient also reports right hip pain- states pain has been going for 3 weeks- pain worsened since fall. Hx of CVA, wears leg/ankle brace d/t stroke related complications. Patient does take blood thinners.

## 2023-04-03 NOTE — H&P
[facility]    History and Physical - Hospitalist Service       Date of Admission:  4/3/2023    Assessment & Plan      Inga Coats is a 83 year old female who presented to the ER with her daughter for syncope at home. Within the ED, she was found to have acute mildly communited fracture of the R femoral neck with anterior angulation and will be admitted for further work up of her syncope and ortho consultation    Of note patient is sedated and a poor historian possibly due to recently receiving morphine in the ER    Syncope  Scalp laceration  Patient lives alone and reports she was walking out of her kitchen when the next thing she remembers she was getting up from the ground. She can't described any further details around the fall. It sounds like she went to bed normally however when she woke up she noticed her bathroom was full of blood.  She doesn't have any memory of the bleeding after her initial fall. After she fell again into her closet but this time she remembers the fall. She denies palpitations or feeling dizzy. She doesn't believe she had LOC but she is poor historian. She does remember R hip pain. Within the ED, EKG reviewed and normal. Troponin negative. Exam notable for head laceration. CT head negative for acute bleed.   *Of note patient with hx of syncope prior to her diagnosis of aortic stenosis and CAD several years ago.    - admit to inpatient on cardiac tele  - obtain echo and orthostatics  - trend troponin  - follow results of above and consider cardiology consultation  - obtain electrolytes, UA pending  - IVF x 10 hours  - remove scalp sutures in 7-10 days    R femoral neck Fracture  Patient reports her R leg was painful before her initial fall.  CT results    IMPRESSION:   1.  Mildly comminuted fracture of the right femoral neck with apex   anterior angulation. No evidence for intertrochanteric extension or   dislocation of the hip joint.   2.  Degenerative  change of the right hip joint.   3.   Left hip negative for fracture.   4.  Pelvis negative for fracture.   5.  No significant hematoma or effusion.     - consult to ortho  - with her syncope, will need results of echo prior to any surgical clearance  - PRN pain medications    Hx of SVT  Notable episodes on heart monitor 01/03/2022  - follow on tele here    CAD s/p CABG x3  Hx of Aortic stenosis s/p AVR  Patient denies any chest pain or cardiac symptoms prior to her fall. No signs of heart failure. Troponin negative and EKG reviewed and reassuring  - hold PTA asa for possible surgery  - follow echo    CVA  Acute CVA to her L corona radiata 2 months ago  - residual R sided weakness  - hold asa, plavix as we sort out plan for surgery, resume statin    CKD  - stable at baseline    Hyponatremia  Hypercalcemia  - will give small fluid bolus  - monitor AM labs    HTN  - resume PTA amlodipine    HLD  - resume PTA statin             Diet:  regular  DVT Prophylaxis: Pneumatic Compression Devices  Mc Catheter: Not present  Lines: None     Cardiac Monitoring: None  Code Status:   FULL    Clinically Significant Risk Factors Present on Admission           # Hypercalcemia: Highest Ca = 10.4 mg/dL in last 2 days, will monitor as appropriate      # Drug Induced Platelet Defect: home medication list includes an antiplatelet medication                Disposition Plan      Expected Discharge Date: 04/05/2023                  Betty Acevedo DO  Hospitalist Service  [facility]  Securely message with [employer] (more info)  Text page via [employer] Paging/Directory     ______________________________________________________________________    Chief Complaint   syncope    History is obtained from the patient and her daugther    History of Present Illness   Inga Coats is a 83 year old female who presents for syncope     Patient reports she was walking out of her kitchen last night when the next thing she  remembers she was getting up from the ground. She felt some R hip pain prior to the fall but otherwise cannot elaborate on any other symptoms. She isn't exactly sure what happened but when she got up from the floor she went to bed. She woke up around 3AM and noticed her bathroom was full of blood which was likely from her fall which she doesn't remember the bleeding. After she woke up in the AM she fell again into her closet but this time she remembers the fall. She denies palpitation or feeling dizzy beforehand. She had ongoing R hip pain during these events but otherwise denies other symptoms. She is poor historian for unclear reasons, she just seems to not remember the events. Patient denies chest pain recently. No SOB. NO fevers.      Past Medical History    No past medical history on file.    Past Surgical History   Past Surgical History:   Procedure Laterality Date     CARDIAC SURGERY         Prior to Admission Medications   Prior to Admission Medications   Prescriptions Last Dose Informant Patient Reported? Taking?   Apoaequorin (PREVAGEN) 10 MG CAPS  Self Yes No   Sig: Take 1 capsule by mouth daily   Misc Natural Products (OSTEO BI-FLEX ADV TRIPLE ST PO)  Self Yes No   Sig: Take 2 capsules by mouth daily   acetaminophen (TYLENOL) 325 MG tablet   No No   Sig: Take 1-2 tablets (325-650 mg) by mouth every 4 hours as needed for mild pain   amLODIPine (NORVASC) 10 MG tablet   No No   Sig: Take 1 tablet (10 mg) by mouth daily   aspirin (ASA) 81 MG chewable tablet   No No   Sig: Take 1 tablet (81 mg) by mouth daily   atorvastatin (LIPITOR) 80 MG tablet   No No   Sig: Take 1 tablet (80 mg) by mouth every evening   clopidogrel (PLAVIX) 75 MG tablet   No No   Sig: Take 1 tablet (75 mg) by mouth daily for 75 days   multivitamin  with lutein (OCUVITE WITH LUTEIN) CAPS per capsule  Self Yes No   Sig: Take 1 capsule by mouth daily   multivitamin (CENTRUM SILVER) tablet  Self Yes No   Sig: Take 1 tablet by mouth daily    vitamin D3 (CHOLECALCIFEROL) 50 mcg (2000 units) tablet  Self Yes No   Sig: Take 1 tablet by mouth daily      Facility-Administered Medications: None        Review of Systems    The 10 point Review of Systems is negative other than noted in the HPI or here.     Social History   I have reviewed this patient's social history and updated it with pertinent information if needed.  Social History     Tobacco Use     Smoking status: Never     Smokeless tobacco: Never   Substance Use Topics     Alcohol use: Not Currently     Drug use: Never        Physical Exam   Vital Signs: Temp: 98.1  F (36.7  C) Temp src: Oral BP: (!) 165/72 Pulse: 88   Resp: 15 SpO2: 98 %      Weight: 0 lbs 0 oz     Constitutional: Awake, alert, cooperative, no apparent distress.  HEENT: R sided dried blood to her hair, laceration noted, not overly painful  Respiratory: Clear to auscultation bilaterally, no crackles or wheezing.  Cardiovascular: Regular rate and rhythm, normal S1 and S2, and no murmur noted. Crisp click  GI: Soft, non-distended, non-tender, normal bowel sounds.  Skin: No rashes, no cyanosis, no edema.  Musculoskeletal:painful R hip, unable to move due to apin  Psychiatric: Alert, oriented to person, place and time, appears sedated    Medical Decision Making       80 MINUTES SPENT BY ME on the date of service doing chart review, history, exam, documentation & further activities per the note.      Data     I have personally reviewed the following data over the past 24 hrs:    11.2 (H)  \   11.9   / 304     132 (L) 97 (L) 26.5 (H) /  171 (H)   5.2 23 0.75 \       Trop: 11 BNP: N/A       Imaging results reviewed over the past 24 hrs:   Recent Results (from the past 24 hour(s))   CT Head w/o Contrast    Narrative    CT HEAD W/O CONTRAST 4/3/2023 12:30 PM    INDICATION: closed head injury. Head trauma.  TECHNIQUE: CT scan of the head without contrast. Dose reduction  techniques were used.  CONTRAST: None.  COMPARISON: 12/31/2022 head  CT.    FINDINGS:   No intracranial hemorrhage, extraaxial collection, mass effect or CT  evidence of acute infarct.  Chronic lacunar infarct in the  periventricular left corona radiata, the left lentiform nucleus, and  left caudate head. Moderate burden of scattered chronic small vessel  ischemic change. Moderate generalized volume loss. The ventricles are  proportional to the sulci. No skull fracture. No scalp hematoma.  Unremarkable orbits. Paranasal sinuses are free of significant  disease. Clear mastoid air cells.      Impression    IMPRESSION:  Age-related changes and chronic ischemic changes as above with no  acute intracranial abnormality.    JAYDEN PETERSON MD         SYSTEM ID:  [id number]   CT Pelvis Bone wo Contrast    Narrative    CT PELVIS BONE WO CONTRAST 4/3/2023 12:30 PM    INDICATION: right hip/pelvis pain after trauma  COMPARISON: None.    TECHNIQUE: Noncontrast. Axial, sagittal and coronal thin-section  reconstruction. Dose reduction techniques were used.   CONTRAST: None.    FINDINGS:   There is a mildly comminuted and impacted fracture through the right  femoral neck without evidence for intertrochanteric extension. No  evidence for dislocation. There is apex anterior angulation at the  fracture, however. Superimposed degenerative change of the right hip  joint.    The left hip is negative for fracture or CT evidence of avascular  necrosis. The remainder the pelvis is negative for fracture.    No significant hematoma or fluid collection around the fracture. The  intrapelvic contents are negative reason      Impression    IMPRESSION:  1.  Mildly comminuted fracture of the right femoral neck with apex  anterior angulation. No evidence for intertrochanteric extension or  dislocation of the hip joint.  2.  Degenerative change of the right hip joint.  3.   Left hip negative for fracture.  4.  Pelvis negative for fracture.  5.  No significant hematoma or effusion.      BORIS LAMB MD         SYSTEM  ID:  [id number]

## 2023-04-03 NOTE — PHARMACY-ADMISSION MEDICATION HISTORY
Pharmacy Medication History  Admission medication history interview status for the 4/3/2023  admission is complete. See EPIC admission navigator for prior to admission medications     Location of Interview: Patient room  Medication history sources: Patient, Patient's family/friend (family member in room with pictures of Rx bottles) and Surescripts    Significant changes made to the medication list:  -Changed amlodipine from 10mg to 5mg daily - family member reported that when discharged from hospital in January directions were 10mg daily but after cardiology appt decreased to 5mg daily in last week - ongoing dose needs to be clarified with PCP  -She is still taking clopidogrel and ASA 81mg daily    In the past week, patient estimated taking medication this percent of the time: greater than 90%    Medication reconciliation completed by provider prior to medication history? Yes    Time spent in this activity: 10 min    Prior to Admission medications    Medication Sig Last Dose Taking? Auth Provider Long Term End Date   acetaminophen (TYLENOL) 325 MG tablet Take 1-2 tablets (325-650 mg) by mouth every 4 hours as needed for mild pain  Yes Joann Nowak PA-C     amLODIPine (NORVASC) 10 MG tablet Take 1 tablet (10 mg) by mouth daily  Patient taking differently: Take 5 mg by mouth daily 4/3/2023 at am Yes Joann Nowak PA-C Yes    Apoaequorin (PREVAGEN) 10 MG CAPS Take 1 capsule by mouth daily  Yes Unknown, Entered By History     aspirin (ASA) 81 MG chewable tablet Take 1 tablet (81 mg) by mouth daily 4/3/2023 at am Yes Joann Nowak PA-C     atorvastatin (LIPITOR) 80 MG tablet Take 1 tablet (80 mg) by mouth every evening 4/2/2023 at pm Yes Joann Nowak PA-C Yes    clopidogrel (PLAVIX) 75 MG tablet Take 1 tablet (75 mg) by mouth daily for 75 days 4/3/2023 at am Yes oJann Nowak PA-C Yes 4/3/23   Misc Natural Products (OSTEO BI-FLEX ADV TRIPLE ST PO) Take 2 capsules by mouth daily  Yes  Unknown, Entered By History     multivitamin  with lutein (OCUVITE WITH LUTEIN) CAPS per capsule Take 1 capsule by mouth daily  Yes Unknown, Entered By History     multivitamin (CENTRUM SILVER) tablet Take 1 tablet by mouth daily  Yes Unknown, Entered By History     vitamin D3 (CHOLECALCIFEROL) 50 mcg (2000 units) tablet Take 1 tablet by mouth daily  Yes Unknown, Entered By History         The information provided in this note is only as accurate as the sources available at the time of update(s)

## 2023-04-03 NOTE — ED NOTES
RN at [facility], pt's laceration is bleeding again. MD called to bedside to assess and re-staple. Pt otherwise stable and has no complaints. Reports pain fine at this time

## 2023-04-03 NOTE — ED NOTES
Bed: FT02  Expected date:   Expected time:   Means of arrival:   Comments:  Sarah 516 [id number] fall head injury

## 2023-04-03 NOTE — ED NOTES
[facility]  ED Nurse Handoff Report    ED Chief complaint: Fall      ED Diagnosis:   Final diagnoses:   Closed fracture of neck of right femur, initial encounter (H)   Closed head injury, initial encounter       Code Status: to be addressed by admitting    Allergies:   Allergies   Allergen Reactions     Shellfish-Derived Products Anaphylaxis     Fish-Derived Products        Patient Story: pt came by ambulance from home. Pt had a fall last evening and started to have hip pain. Pt was able to walk to bed and this morning was cleaning up blood from cut on head and fell into closet. pt was having increased pain. Pt hit head. Pt has hx of CVA and wears leg braces since then.  Pt has 2cm lac on scalp  Focused Assessment:  Pt has some confusion and repeating questions. Pt c/o pain all over. Pt has had two doses of 2mg IVP morphine and required oxygen to be placed after second dose. Pt given ice to place on hip.   Abnormal Labs Resulted from Time of ED Arrival to Time of ED Departure   BASIC METABOLIC PANEL - Abnormal       Result Value    Sodium 132 (*)     Potassium 5.2      Chloride 97 (*)     Carbon Dioxide (CO2) 23      Anion Gap 12      Urea Nitrogen 26.5 (*)     Creatinine 0.75      Calcium 10.4 (*)     Glucose 171 (*)     GFR Estimate 78     CBC WITH PLATELETS AND DIFFERENTIAL - Abnormal    WBC Count 11.2 (*)     RBC Count 4.24      Hemoglobin 11.9      Hematocrit 35.2      MCV 83      MCH 28.1      MCHC 33.8      RDW 13.8      Platelet Count 304      % Neutrophils 89      % Lymphocytes 6      % Monocytes 5      % Eosinophils 0      % Basophils 0      % Immature Granulocytes 0      NRBCs per 100 WBC 0      Absolute Neutrophils 10.0 (*)     Absolute Lymphocytes 0.7 (*)     Absolute Monocytes 0.6      Absolute Eosinophils 0.0      Absolute Basophils 0.0      Absolute Immature Granulocytes 0.0      Absolute NRBCs 0.0       CT Pelvis Bone wo Contrast   Final Result   IMPRESSION:   1.  Mildly comminuted  fracture of the right femoral neck with apex   anterior angulation. No evidence for intertrochanteric extension or   dislocation of the hip joint.   2.  Degenerative change of the right hip joint.   3.   Left hip negative for fracture.   4.  Pelvis negative for fracture.   5.  No significant hematoma or effusion.         BORIS LAMB MD            SYSTEM ID:  [id number]      CT Head w/o Contrast   Final Result   IMPRESSION:   Age-related changes and chronic ischemic changes as above with no   acute intracranial abnormality.      JAYDEN PETERSON MD            SYSTEM ID:  [id number]      Treatments and/or interventions provided: as above  Patient's response to treatments and/or interventions: still c/o pain    To be done/followed up on inpatient unit:  admission orders    Does this patient have any cognitive concerns?: Forgetful    Activity level - Baseline/Home:  Independent  Activity Level - Current:   Total Care    Patient's Preferred language: English   Needed?: No    Isolation: None  Infection: Not Applicable  Patient tested for COVID 19 prior to admission: NO  Bariatric?: No    Vital Signs:   Vitals:    04/03/23 1126 04/03/23 1137 04/03/23 1200 04/03/23 1330   BP: (!) 165/72  139/57 125/55   Pulse: 88  77 75   Resp: 15  16 19   Temp:  98.1  F (36.7  C)     TempSrc:  Oral     SpO2: 98%  92% 94%       Cardiac Rhythm:     Was the PSS-3 completed:   Yes  What interventions are required if any?               Family Comments: daughter at bedside  OBS brochure/video discussed/provided to patient/family: N/A              Name of person given brochure if not patient:               Relationship to patient:     For the majority of the shift this patient's behavior was Green.   Behavioral interventions performed were .    ED NURSE PHONE NUMBER: *[phone]

## 2023-04-03 NOTE — ED PROVIDER NOTES
"  History     Chief Complaint:  Fall       HPI   Inga Coats is a 83 year old female with a history of CAD, CKD, and stroke who presents via EMS for evaluation of two falls. The first fall occurred last night, and she does not recall what caused her to fall. She was able to get back up in bed and go to sleep. This morning, she went to the bathroom to wash her face and she noticed some blood in her hair. She then walked to get clothes from her closet when \"the light went out\" and fell again. Her neighbor was able to come over and help her up into a chair, and then her daughter was called shortly after. EMS notes a small laceration to her scalp, and she also complains of right hip pain. The right hip pain has been about a 4.5/10 for the past week, but it has worsened since the fall. She denies abdominal pain, chest pain, visual disturbances, or neck pain.    Of note, EMS reports she suffered a stroke in January. Additionally, her daughter arrived and verifies the story.      Independent Historian:   Patient and EMS    Review of External Notes: I reviewed her 12/30/22 admission note.    ROS:  Review of Systems   Eyes: Negative for visual disturbance.   Cardiovascular: Negative for chest pain.   Gastrointestinal: Negative for abdominal pain.   Musculoskeletal: Positive for arthralgias.   Skin: Positive for wound.   Neurological: Positive for syncope (possible).   All other systems reviewed and are negative.      Allergies:  Shellfish-Derived Products     Medications:  Atorvastatin  Nitroglycerin  Aspirin 81 mg  Norvasc  Plavix  Glucosamine HCL     Past Medical History:     Transient cerebral ischemia  Coronary artery disease  Severe aortic stenosis  Hyperlipidemia  Stroke  CKD stage 3     Past Surgical History:    CABG x3  Aortic valve replacement     Family History:    Heart disease    Social History:   reports that she has never smoked. She has never used smokeless tobacco. She reports that she does not " currently use alcohol. She reports that she does not use drugs.  PCP: Joe Ramires     Physical Exam     Patient Vitals for the past 24 hrs:   BP Temp Temp src Pulse Resp SpO2   04/03/23 1330 125/55 -- -- 75 19 94 %   04/03/23 1200 139/57 -- -- 77 16 92 %   04/03/23 1137 -- 98.1  F (36.7  C) Oral -- -- --   04/03/23 1126 (!) 165/72 -- -- 88 15 98 %        Physical Exam  Eye:  Pupils are equal, round, and reactive.  Extraocular movements intact.    ENT:  No rhinorrhea.  Moist mucus membranes.  Normal tongue and tonsil.    Cardiac:  Regular rate and rhythm.  No murmurs, gallops, or rubs.    Pulmonary:  Clear to auscultation bilaterally.  No wheezes, rales, or rhonchi.    Abdomen:  Positive bowel sounds.  Abdomen is soft and non-distended, without focal tenderness.    Musculoskeletal:  Normal movement of all extremities without evidence for deficit. No tenderness to the neck. Diffuse tenderness over the right proximal femur and pelvis with inability to range secondary to pain.    Skin:  Warm and dry without rashes. 2 cm laceration to the right parietal scalp without active bleeding    Neurologic:  Non-focal exam without asymmetric weakness or numbness. Unable to assess right leg strength due to pain.    Psychiatric:  Normal affect with appropriate interaction with examiner. Patient is an inconsistent historian.      Emergency Department Course     ECG results from 04/03/23 - EKG read by Dr. Trierweiler at 1312.   EKG 12-lead, tracing only     Value    Systolic Blood Pressure     Diastolic Blood Pressure     Ventricular Rate 79    Atrial Rate 79    GA Interval 186    QRS Duration 74        QTc 433    P Axis 63    R AXIS 22    T Axis 112    Interpretation ECG      Sinus rhythm  Possible Left atrial enlargement  Cannot rule out Anterior infarct , age undetermined  Abnormal ECG  When compared with ECG of 30-DEC-2022 11:22,  Inverted T waves have replaced nonspecific T wave abnormality in Lateral  leads         Imaging:  CT Pelvis Bone wo Contrast   Final Result   IMPRESSION:   1.  Mildly comminuted fracture of the right femoral neck with apex   anterior angulation. No evidence for intertrochanteric extension or   dislocation of the hip joint.   2.  Degenerative change of the right hip joint.   3.   Left hip negative for fracture.   4.  Pelvis negative for fracture.   5.  No significant hematoma or effusion.         BORIS LAMB MD            SYSTEM ID:  [id number]      CT Head w/o Contrast   Final Result   IMPRESSION:   Age-related changes and chronic ischemic changes as above with no   acute intracranial abnormality.      JAYDEN PETERSON MD            SYSTEM ID:  [id number]         Report per radiology    Laboratory:  Labs Ordered and Resulted from Time of ED Arrival to Time of ED Departure   BASIC METABOLIC PANEL - Abnormal       Result Value    Sodium 132 (*)     Potassium 5.2      Chloride 97 (*)     Carbon Dioxide (CO2) 23      Anion Gap 12      Urea Nitrogen 26.5 (*)     Creatinine 0.75      Calcium 10.4 (*)     Glucose 171 (*)     GFR Estimate 78     CBC WITH PLATELETS AND DIFFERENTIAL - Abnormal    WBC Count 11.2 (*)     RBC Count 4.24      Hemoglobin 11.9      Hematocrit 35.2      MCV 83      MCH 28.1      MCHC 33.8      RDW 13.8      Platelet Count 304      % Neutrophils 89      % Lymphocytes 6      % Monocytes 5      % Eosinophils 0      % Basophils 0      % Immature Granulocytes 0      NRBCs per 100 WBC 0      Absolute Neutrophils 10.0 (*)     Absolute Lymphocytes 0.7 (*)     Absolute Monocytes 0.6      Absolute Eosinophils 0.0      Absolute Basophils 0.0      Absolute Immature Granulocytes 0.0      Absolute NRBCs 0.0     TROPONIN T, HIGH SENSITIVITY - Normal    Troponin T, High Sensitivity 11     ROUTINE UA WITH MICROSCOPIC REFLEX TO CULTURE        Laceration Repair      Procedure: Laceration Repair    Indication: Laceration    Consent: Verbal    Location:  Right upper forehead    Length: 2  cm    Preparation: Irrigation with Sterile Saline.    Anesthesia/Sedation: Lidocaine - 1%      Treatment/Exploration: Wound explored, no foreign bodies found     Closure: The wound was closed with 3 staples.  Due to postoperative bleeding, one of the staples was removed and a horizontal mattress suture was placed with 4-0 Vicryl secondary to an arterial bleed.    Patient Status: The patient tolerated the procedure well: Yes. There were no complications.    Emergency Department Course & Assessments:    Interventions:  Medications   sodium chloride (PF) 0.9% PF flush 3 mL (3 mLs Intracatheter Not Given 4/3/23 1326)   sodium chloride (PF) 0.9% PF flush 3 mL (has no administration in time range)   morphine (PF) injection 2 mg (2 mg Intravenous $Given 4/3/23 1326)   0.9% sodium chloride BOLUS (0 mLs Intravenous Stopped 4/3/23 1236)   morphine (PF) injection 2 mg (2 mg Intravenous $Given 4/3/23 1137)   ketorolac (TORADOL) injection 15 mg (15 mg Intravenous $Given 4/3/23 1137)   hydrogen peroxide 3 % solution 100 mL (100 mLs Topical $Given 4/3/23 1429)        Assessments:  1116 I obtained history and examined the patient, as noted above.  1255 I rechecked and updated the patient.  1451 I performed the laceration repair at this time.    Independent Interpretation (X-rays, CTs, rhythm strip):  1253 I reviewed the patient's CT scan    Consultations/Discussion of Management or Tests:  1342 I spoke with Dr. Acevedo from the hospitalist service regarding the patient's presentation, findings here in the ED, and plan of care.     Social Determinants of Health affecting care:   None    Disposition:  The patient was admitted to the hospital under the care of Dr. Acevedo.     Impression & Plan      Medical Decision Making:  This unfortunate 83-year-old woman presents to us with 2 falls.  Neither fall is well described as to the cause.  The patient cannot specifically say that she felt lightheaded nor does she think that she lost  consciousness.  She did suffer a laceration to the right temporal scalp and later had a fall into her closet where she has suffered a fracture of her right femoral neck.  Remainder of her trauma evaluation is unremarkable.  Laboratory investigation is reassuring.  Regarding her laceration, it is over the temporal artery.  Initially, it was not bleeding, though there was a small piece of material extruding from the wound that I now presume is likely a piece of her temporal artery.  I stapled her wound closed and removed it, later to find that she had persistent arterial bleeding from the wound site.  I removed one of the staples and placed a horizontal mattress suture which helped stop the bleeding.  She was then cleaned up and this was observed closely with no further bleeding.    At this juncture, she will require admission to the hospital for orthopedic consultation and surgical repair.  She also will require further work-up for these falls as to whether they could represent a cardiac etiology.  I spoke with Dr. Acevedo of the hospitalist service who admit the patient under inpatient status to telemetry monitoring and close orthopedic consultation.  The family's questions were answered and they are comfortable with the plan for admission.    Diagnosis:    ICD-10-CM    1. Closed fracture of neck of right femur, initial encounter (H)  S72.001A       2. Closed head injury, initial encounter  S09.90XA            Scribe Disclosure:  I, Lele Trevino, am serving as a scribe at 11:21 AM on 4/3/2023 to document services personally performed by Trierweiler, Chad A, MD based on my observations and the provider's statements to me.      Trierweiler, Chad A, MD  04/03/23 8320

## 2023-04-03 NOTE — ED NOTES
Pt still has not voided- reports she now has the urge but she reports she cannot pee laying down. RN inserted the purwick and pt was unable to void and asked for it to be removed. RN offered armenta and pt also declined. She reports she will wait and try again soon.

## 2023-04-04 ENCOUNTER — ANESTHESIA (OUTPATIENT)
Dept: SURGERY | Facility: CLINIC | Age: 83
DRG: 522 | End: 2023-04-04
Payer: COMMERCIAL

## 2023-04-04 ENCOUNTER — APPOINTMENT (OUTPATIENT)
Dept: GENERAL RADIOLOGY | Facility: CLINIC | Age: 83
DRG: 522 | End: 2023-04-04
Attending: PHYSICIAN ASSISTANT
Payer: COMMERCIAL

## 2023-04-04 ENCOUNTER — APPOINTMENT (OUTPATIENT)
Dept: CARDIOLOGY | Facility: CLINIC | Age: 83
DRG: 522 | End: 2023-04-04
Attending: STUDENT IN AN ORGANIZED HEALTH CARE EDUCATION/TRAINING PROGRAM
Payer: COMMERCIAL

## 2023-04-04 ENCOUNTER — ANESTHESIA EVENT (OUTPATIENT)
Dept: SURGERY | Facility: CLINIC | Age: 83
DRG: 522 | End: 2023-04-04
Payer: COMMERCIAL

## 2023-04-04 LAB
ABO/RH(D): NORMAL
ALBUMIN UR-MCNC: NEGATIVE MG/DL
ANION GAP SERPL CALCULATED.3IONS-SCNC: 8 MMOL/L (ref 7–15)
ANTIBODY SCREEN: NEGATIVE
APPEARANCE UR: CLEAR
BASOPHILS # BLD AUTO: 0 10E3/UL (ref 0–0.2)
BASOPHILS NFR BLD AUTO: 0 %
BILIRUB UR QL STRIP: NEGATIVE
BUN SERPL-MCNC: 16.3 MG/DL (ref 8–23)
CALCIUM SERPL-MCNC: 8.7 MG/DL (ref 8.8–10.2)
CHLORIDE SERPL-SCNC: 104 MMOL/L (ref 98–107)
COLOR UR AUTO: ABNORMAL
CREAT SERPL-MCNC: 0.76 MG/DL (ref 0.51–0.95)
DEPRECATED CALCIDIOL+CALCIFEROL SERPL-MC: 70 UG/L (ref 20–75)
DEPRECATED HCO3 PLAS-SCNC: 23 MMOL/L (ref 22–29)
EOSINOPHIL # BLD AUTO: 0 10E3/UL (ref 0–0.7)
EOSINOPHIL NFR BLD AUTO: 0 %
ERYTHROCYTE [DISTWIDTH] IN BLOOD BY AUTOMATED COUNT: 14.3 % (ref 10–15)
GFR SERPL CREATININE-BSD FRML MDRD: 76 ML/MIN/1.73M2
GLUCOSE BLDC GLUCOMTR-MCNC: 100 MG/DL (ref 70–99)
GLUCOSE BLDC GLUCOMTR-MCNC: 121 MG/DL (ref 70–99)
GLUCOSE SERPL-MCNC: 115 MG/DL (ref 70–99)
GLUCOSE UR STRIP-MCNC: NEGATIVE MG/DL
HCT VFR BLD AUTO: 29.8 % (ref 35–47)
HGB BLD-MCNC: 10 G/DL (ref 11.7–15.7)
HGB UR QL STRIP: NEGATIVE
IMM GRANULOCYTES # BLD: 0 10E3/UL
IMM GRANULOCYTES NFR BLD: 0 %
KETONES UR STRIP-MCNC: NEGATIVE MG/DL
LEUKOCYTE ESTERASE UR QL STRIP: NEGATIVE
LVEF ECHO: NORMAL
LYMPHOCYTES # BLD AUTO: 0.9 10E3/UL (ref 0.8–5.3)
LYMPHOCYTES NFR BLD AUTO: 12 %
MCH RBC QN AUTO: 28.6 PG (ref 26.5–33)
MCHC RBC AUTO-ENTMCNC: 33.6 G/DL (ref 31.5–36.5)
MCV RBC AUTO: 85 FL (ref 78–100)
MONOCYTES # BLD AUTO: 0.7 10E3/UL (ref 0–1.3)
MONOCYTES NFR BLD AUTO: 10 %
MUCOUS THREADS #/AREA URNS LPF: PRESENT /LPF
NEUTROPHILS # BLD AUTO: 5.4 10E3/UL (ref 1.6–8.3)
NEUTROPHILS NFR BLD AUTO: 78 %
NITRATE UR QL: NEGATIVE
NRBC # BLD AUTO: 0 10E3/UL
NRBC BLD AUTO-RTO: 0 /100
PH UR STRIP: 6.5 [PH] (ref 5–7)
PLATELET # BLD AUTO: 224 10E3/UL (ref 150–450)
POTASSIUM SERPL-SCNC: 4.4 MMOL/L (ref 3.4–5.3)
RBC # BLD AUTO: 3.5 10E6/UL (ref 3.8–5.2)
RBC URINE: 1 /HPF
SODIUM SERPL-SCNC: 135 MMOL/L (ref 136–145)
SP GR UR STRIP: 1.02 (ref 1–1.03)
SPECIMEN EXPIRATION DATE: NORMAL
SQUAMOUS EPITHELIAL: <1 /HPF
UROBILINOGEN UR STRIP-MCNC: NORMAL MG/DL
WBC # BLD AUTO: 7 10E3/UL (ref 4–11)
WBC URINE: <1 /HPF

## 2023-04-04 PROCEDURE — 250N000013 HC RX MED GY IP 250 OP 250 PS 637: Performed by: PHYSICIAN ASSISTANT

## 2023-04-04 PROCEDURE — 710N000009 HC RECOVERY PHASE 1, LEVEL 1, PER MIN: Performed by: ORTHOPAEDIC SURGERY

## 2023-04-04 PROCEDURE — 250N000011 HC RX IP 250 OP 636: Performed by: NURSE ANESTHETIST, CERTIFIED REGISTERED

## 2023-04-04 PROCEDURE — 93325 DOPPLER ECHO COLOR FLOW MAPG: CPT | Mod: 26 | Performed by: INTERNAL MEDICINE

## 2023-04-04 PROCEDURE — 272N000001 HC OR GENERAL SUPPLY STERILE: Performed by: ORTHOPAEDIC SURGERY

## 2023-04-04 PROCEDURE — 258N000001 HC RX 258: Performed by: ORTHOPAEDIC SURGERY

## 2023-04-04 PROCEDURE — 258N000003 HC RX IP 258 OP 636: Performed by: STUDENT IN AN ORGANIZED HEALTH CARE EDUCATION/TRAINING PROGRAM

## 2023-04-04 PROCEDURE — 81001 URINALYSIS AUTO W/SCOPE: CPT | Performed by: EMERGENCY MEDICINE

## 2023-04-04 PROCEDURE — 250N000011 HC RX IP 250 OP 636: Performed by: ORTHOPAEDIC SURGERY

## 2023-04-04 PROCEDURE — 250N000013 HC RX MED GY IP 250 OP 250 PS 637: Performed by: STUDENT IN AN ORGANIZED HEALTH CARE EDUCATION/TRAINING PROGRAM

## 2023-04-04 PROCEDURE — 250N000009 HC RX 250: Performed by: NURSE ANESTHETIST, CERTIFIED REGISTERED

## 2023-04-04 PROCEDURE — 360N000077 HC SURGERY LEVEL 4, PER MIN: Performed by: ORTHOPAEDIC SURGERY

## 2023-04-04 PROCEDURE — 258N000003 HC RX IP 258 OP 636: Performed by: ORTHOPAEDIC SURGERY

## 2023-04-04 PROCEDURE — 86850 RBC ANTIBODY SCREEN: CPT | Performed by: PHYSICIAN ASSISTANT

## 2023-04-04 PROCEDURE — 85025 COMPLETE CBC W/AUTO DIFF WBC: CPT | Performed by: STUDENT IN AN ORGANIZED HEALTH CARE EDUCATION/TRAINING PROGRAM

## 2023-04-04 PROCEDURE — 120N000001 HC R&B MED SURG/OB

## 2023-04-04 PROCEDURE — C8924 2D TTE W OR W/O FOL W/CON,FU: HCPCS

## 2023-04-04 PROCEDURE — 82306 VITAMIN D 25 HYDROXY: CPT | Performed by: PHYSICIAN ASSISTANT

## 2023-04-04 PROCEDURE — 255N000002 HC RX 255 OP 636: Performed by: STUDENT IN AN ORGANIZED HEALTH CARE EDUCATION/TRAINING PROGRAM

## 2023-04-04 PROCEDURE — 36415 COLL VENOUS BLD VENIPUNCTURE: CPT | Performed by: STUDENT IN AN ORGANIZED HEALTH CARE EDUCATION/TRAINING PROGRAM

## 2023-04-04 PROCEDURE — 370N000017 HC ANESTHESIA TECHNICAL FEE, PER MIN: Performed by: ORTHOPAEDIC SURGERY

## 2023-04-04 PROCEDURE — 258N000003 HC RX IP 258 OP 636: Performed by: NURSE ANESTHETIST, CERTIFIED REGISTERED

## 2023-04-04 PROCEDURE — 999N000208 ECHOCARDIOGRAM LIMITED

## 2023-04-04 PROCEDURE — C1713 ANCHOR/SCREW BN/BN,TIS/BN: HCPCS | Performed by: ORTHOPAEDIC SURGERY

## 2023-04-04 PROCEDURE — C1776 JOINT DEVICE (IMPLANTABLE): HCPCS | Performed by: ORTHOPAEDIC SURGERY

## 2023-04-04 PROCEDURE — 250N000009 HC RX 250: Performed by: ORTHOPAEDIC SURGERY

## 2023-04-04 PROCEDURE — 0HQ1XZZ REPAIR FACE SKIN, EXTERNAL APPROACH: ICD-10-PCS | Performed by: EMERGENCY MEDICINE

## 2023-04-04 PROCEDURE — 80048 BASIC METABOLIC PNL TOTAL CA: CPT | Performed by: STUDENT IN AN ORGANIZED HEALTH CARE EDUCATION/TRAINING PROGRAM

## 2023-04-04 PROCEDURE — 250N000011 HC RX IP 250 OP 636: Performed by: STUDENT IN AN ORGANIZED HEALTH CARE EDUCATION/TRAINING PROGRAM

## 2023-04-04 PROCEDURE — 999N000141 HC STATISTIC PRE-PROCEDURE NURSING ASSESSMENT: Performed by: ORTHOPAEDIC SURGERY

## 2023-04-04 PROCEDURE — 93308 TTE F-UP OR LMTD: CPT | Mod: 26 | Performed by: INTERNAL MEDICINE

## 2023-04-04 PROCEDURE — 258N000003 HC RX IP 258 OP 636: Performed by: ANESTHESIOLOGY

## 2023-04-04 PROCEDURE — 250N000025 HC SEVOFLURANE, PER MIN: Performed by: ORTHOPAEDIC SURGERY

## 2023-04-04 PROCEDURE — 99233 SBSQ HOSP IP/OBS HIGH 50: CPT | Performed by: STUDENT IN AN ORGANIZED HEALTH CARE EDUCATION/TRAINING PROGRAM

## 2023-04-04 PROCEDURE — 999N000063 XR PELVIS PORT 1/2 VIEWS

## 2023-04-04 PROCEDURE — 93321 DOPPLER ECHO F-UP/LMTD STD: CPT | Mod: 26 | Performed by: INTERNAL MEDICINE

## 2023-04-04 DEVICE — BONE CEMENT STRK SIMPLEX P SPEEDSET 6192-1-001: Type: IMPLANTABLE DEVICE | Site: HIP | Status: FUNCTIONAL

## 2023-04-04 DEVICE — HIP STEM
Type: IMPLANTABLE DEVICE | Site: HIP | Status: FUNCTIONAL
Brand: OMNIFIT

## 2023-04-04 DEVICE — NECK ADJUSTMENT SLEEVE
Type: IMPLANTABLE DEVICE | Site: HIP | Status: FUNCTIONAL
Brand: UNITRAX

## 2023-04-04 DEVICE — UNIVERSAL DISTAL CEMENT SPACER
Type: IMPLANTABLE DEVICE | Site: HIP | Status: FUNCTIONAL
Brand: OMNIFIT

## 2023-04-04 DEVICE — HEAD COMPONENT
Type: IMPLANTABLE DEVICE | Site: HIP | Status: FUNCTIONAL
Brand: UNITRAX

## 2023-04-04 RX ORDER — NALOXONE HYDROCHLORIDE 0.4 MG/ML
0.4 INJECTION, SOLUTION INTRAMUSCULAR; INTRAVENOUS; SUBCUTANEOUS
Status: DISCONTINUED | OUTPATIENT
Start: 2023-04-04 | End: 2023-04-07 | Stop reason: HOSPADM

## 2023-04-04 RX ORDER — ONDANSETRON 4 MG/1
4 TABLET, ORALLY DISINTEGRATING ORAL EVERY 30 MIN PRN
Status: DISCONTINUED | OUTPATIENT
Start: 2023-04-04 | End: 2023-04-04 | Stop reason: HOSPADM

## 2023-04-04 RX ORDER — HYDROXYZINE HYDROCHLORIDE 10 MG/1
10 TABLET, FILM COATED ORAL EVERY 6 HOURS PRN
Status: DISCONTINUED | OUTPATIENT
Start: 2023-04-04 | End: 2023-04-07 | Stop reason: HOSPADM

## 2023-04-04 RX ORDER — SODIUM CHLORIDE, SODIUM LACTATE, POTASSIUM CHLORIDE, CALCIUM CHLORIDE 600; 310; 30; 20 MG/100ML; MG/100ML; MG/100ML; MG/100ML
INJECTION, SOLUTION INTRAVENOUS CONTINUOUS
Status: DISCONTINUED | OUTPATIENT
Start: 2023-04-04 | End: 2023-04-04 | Stop reason: HOSPADM

## 2023-04-04 RX ORDER — CEFAZOLIN SODIUM/WATER 2 G/20 ML
2 SYRINGE (ML) INTRAVENOUS
Status: DISCONTINUED | OUTPATIENT
Start: 2023-04-04 | End: 2023-04-04 | Stop reason: HOSPADM

## 2023-04-04 RX ORDER — ONDANSETRON 4 MG/1
4 TABLET, ORALLY DISINTEGRATING ORAL EVERY 6 HOURS PRN
Status: DISCONTINUED | OUTPATIENT
Start: 2023-04-04 | End: 2023-04-04

## 2023-04-04 RX ORDER — SODIUM CHLORIDE, SODIUM LACTATE, POTASSIUM CHLORIDE, CALCIUM CHLORIDE 600; 310; 30; 20 MG/100ML; MG/100ML; MG/100ML; MG/100ML
INJECTION, SOLUTION INTRAVENOUS CONTINUOUS
Status: DISCONTINUED | OUTPATIENT
Start: 2023-04-04 | End: 2023-04-07 | Stop reason: HOSPADM

## 2023-04-04 RX ORDER — HYDROMORPHONE HCL IN WATER/PF 6 MG/30 ML
0.2 PATIENT CONTROLLED ANALGESIA SYRINGE INTRAVENOUS
Status: DISCONTINUED | OUTPATIENT
Start: 2023-04-04 | End: 2023-04-07 | Stop reason: HOSPADM

## 2023-04-04 RX ORDER — TRANEXAMIC ACID 10 MG/ML
1 INJECTION, SOLUTION INTRAVENOUS ONCE
Status: DISCONTINUED | OUTPATIENT
Start: 2023-04-04 | End: 2023-04-04 | Stop reason: HOSPADM

## 2023-04-04 RX ORDER — SODIUM CHLORIDE, SODIUM LACTATE, POTASSIUM CHLORIDE, CALCIUM CHLORIDE 600; 310; 30; 20 MG/100ML; MG/100ML; MG/100ML; MG/100ML
INJECTION, SOLUTION INTRAVENOUS CONTINUOUS PRN
Status: DISCONTINUED | OUTPATIENT
Start: 2023-04-04 | End: 2023-04-04

## 2023-04-04 RX ORDER — PROCHLORPERAZINE MALEATE 5 MG
5 TABLET ORAL EVERY 6 HOURS PRN
Status: DISCONTINUED | OUTPATIENT
Start: 2023-04-04 | End: 2023-04-07 | Stop reason: HOSPADM

## 2023-04-04 RX ORDER — CEFAZOLIN SODIUM 2 G/100ML
2 INJECTION, SOLUTION INTRAVENOUS SEE ADMIN INSTRUCTIONS
Status: DISCONTINUED | OUTPATIENT
Start: 2023-04-04 | End: 2023-04-04 | Stop reason: HOSPADM

## 2023-04-04 RX ORDER — HYDROMORPHONE HCL IN WATER/PF 6 MG/30 ML
0.4 PATIENT CONTROLLED ANALGESIA SYRINGE INTRAVENOUS EVERY 5 MIN PRN
Status: DISCONTINUED | OUTPATIENT
Start: 2023-04-04 | End: 2023-04-04 | Stop reason: HOSPADM

## 2023-04-04 RX ORDER — ACETAMINOPHEN 325 MG/1
975 TABLET ORAL EVERY 8 HOURS
Status: COMPLETED | OUTPATIENT
Start: 2023-04-04 | End: 2023-04-07

## 2023-04-04 RX ORDER — MAGNESIUM HYDROXIDE 1200 MG/15ML
LIQUID ORAL PRN
Status: DISCONTINUED | OUTPATIENT
Start: 2023-04-04 | End: 2023-04-04 | Stop reason: HOSPADM

## 2023-04-04 RX ORDER — FENTANYL CITRATE 0.05 MG/ML
25 INJECTION, SOLUTION INTRAMUSCULAR; INTRAVENOUS EVERY 5 MIN PRN
Status: DISCONTINUED | OUTPATIENT
Start: 2023-04-04 | End: 2023-04-04 | Stop reason: HOSPADM

## 2023-04-04 RX ORDER — TRANEXAMIC ACID 10 MG/ML
1 INJECTION, SOLUTION INTRAVENOUS ONCE
Status: COMPLETED | OUTPATIENT
Start: 2023-04-04 | End: 2023-04-04

## 2023-04-04 RX ORDER — BISACODYL 10 MG
10 SUPPOSITORY, RECTAL RECTAL DAILY PRN
Status: DISCONTINUED | OUTPATIENT
Start: 2023-04-04 | End: 2023-04-07 | Stop reason: HOSPADM

## 2023-04-04 RX ORDER — LIDOCAINE 40 MG/G
CREAM TOPICAL
Status: DISCONTINUED | OUTPATIENT
Start: 2023-04-04 | End: 2023-04-07 | Stop reason: HOSPADM

## 2023-04-04 RX ORDER — NEOSTIGMINE METHYLSULFATE 1 MG/ML
VIAL (ML) INJECTION PRN
Status: DISCONTINUED | OUTPATIENT
Start: 2023-04-04 | End: 2023-04-04

## 2023-04-04 RX ORDER — ACETAMINOPHEN 325 MG/1
650 TABLET ORAL EVERY 4 HOURS PRN
Status: DISCONTINUED | OUTPATIENT
Start: 2023-04-07 | End: 2023-04-07 | Stop reason: HOSPADM

## 2023-04-04 RX ORDER — FENTANYL CITRATE 50 UG/ML
INJECTION, SOLUTION INTRAMUSCULAR; INTRAVENOUS PRN
Status: DISCONTINUED | OUTPATIENT
Start: 2023-04-04 | End: 2023-04-04

## 2023-04-04 RX ORDER — CEFAZOLIN SODIUM 2 G/100ML
2 INJECTION, SOLUTION INTRAVENOUS
Status: DISCONTINUED | OUTPATIENT
Start: 2023-04-04 | End: 2023-04-04 | Stop reason: HOSPADM

## 2023-04-04 RX ORDER — ONDANSETRON 2 MG/ML
4 INJECTION INTRAMUSCULAR; INTRAVENOUS EVERY 6 HOURS PRN
Status: DISCONTINUED | OUTPATIENT
Start: 2023-04-04 | End: 2023-04-04

## 2023-04-04 RX ORDER — POLYETHYLENE GLYCOL 3350 17 G/17G
17 POWDER, FOR SOLUTION ORAL DAILY
Status: DISCONTINUED | OUTPATIENT
Start: 2023-04-05 | End: 2023-04-07 | Stop reason: HOSPADM

## 2023-04-04 RX ORDER — OXYCODONE HYDROCHLORIDE 5 MG/1
10 TABLET ORAL EVERY 4 HOURS PRN
Status: DISCONTINUED | OUTPATIENT
Start: 2023-04-04 | End: 2023-04-07 | Stop reason: HOSPADM

## 2023-04-04 RX ORDER — HYDROMORPHONE HCL IN WATER/PF 6 MG/30 ML
0.4 PATIENT CONTROLLED ANALGESIA SYRINGE INTRAVENOUS
Status: DISCONTINUED | OUTPATIENT
Start: 2023-04-04 | End: 2023-04-04

## 2023-04-04 RX ORDER — OXYCODONE HYDROCHLORIDE 5 MG/1
5 TABLET ORAL EVERY 4 HOURS PRN
Status: DISCONTINUED | OUTPATIENT
Start: 2023-04-04 | End: 2023-04-07 | Stop reason: HOSPADM

## 2023-04-04 RX ORDER — AMOXICILLIN 250 MG
1 CAPSULE ORAL 2 TIMES DAILY
Status: DISCONTINUED | OUTPATIENT
Start: 2023-04-04 | End: 2023-04-07 | Stop reason: HOSPADM

## 2023-04-04 RX ORDER — LIDOCAINE HYDROCHLORIDE 20 MG/ML
INJECTION, SOLUTION INFILTRATION; PERINEURAL PRN
Status: DISCONTINUED | OUTPATIENT
Start: 2023-04-04 | End: 2023-04-04

## 2023-04-04 RX ORDER — HYDROMORPHONE HCL IN WATER/PF 6 MG/30 ML
0.4 PATIENT CONTROLLED ANALGESIA SYRINGE INTRAVENOUS
Status: DISCONTINUED | OUTPATIENT
Start: 2023-04-04 | End: 2023-04-07 | Stop reason: HOSPADM

## 2023-04-04 RX ORDER — VANCOMYCIN HYDROCHLORIDE 1 G/20ML
INJECTION, POWDER, LYOPHILIZED, FOR SOLUTION INTRAVENOUS PRN
Status: DISCONTINUED | OUTPATIENT
Start: 2023-04-04 | End: 2023-04-04 | Stop reason: HOSPADM

## 2023-04-04 RX ORDER — CEFAZOLIN SODIUM/WATER 2 G/20 ML
2 SYRINGE (ML) INTRAVENOUS SEE ADMIN INSTRUCTIONS
Status: DISCONTINUED | OUTPATIENT
Start: 2023-04-04 | End: 2023-04-04 | Stop reason: HOSPADM

## 2023-04-04 RX ORDER — FENTANYL CITRATE 0.05 MG/ML
50 INJECTION, SOLUTION INTRAMUSCULAR; INTRAVENOUS EVERY 5 MIN PRN
Status: DISCONTINUED | OUTPATIENT
Start: 2023-04-04 | End: 2023-04-04 | Stop reason: HOSPADM

## 2023-04-04 RX ORDER — PROPOFOL 10 MG/ML
INJECTION, EMULSION INTRAVENOUS PRN
Status: DISCONTINUED | OUTPATIENT
Start: 2023-04-04 | End: 2023-04-04

## 2023-04-04 RX ORDER — ASPIRIN 81 MG/1
81 TABLET ORAL 2 TIMES DAILY
Status: DISCONTINUED | OUTPATIENT
Start: 2023-04-04 | End: 2023-04-07 | Stop reason: HOSPADM

## 2023-04-04 RX ORDER — HYDROMORPHONE HYDROCHLORIDE 2 MG/1
2 TABLET ORAL
Status: DISCONTINUED | OUTPATIENT
Start: 2023-04-04 | End: 2023-04-04

## 2023-04-04 RX ORDER — CEFAZOLIN SODIUM/WATER 2 G/20 ML
SYRINGE (ML) INTRAVENOUS PRN
Status: DISCONTINUED | OUTPATIENT
Start: 2023-04-04 | End: 2023-04-04

## 2023-04-04 RX ORDER — NALOXONE HYDROCHLORIDE 0.4 MG/ML
0.2 INJECTION, SOLUTION INTRAMUSCULAR; INTRAVENOUS; SUBCUTANEOUS
Status: DISCONTINUED | OUTPATIENT
Start: 2023-04-04 | End: 2023-04-07 | Stop reason: HOSPADM

## 2023-04-04 RX ORDER — ONDANSETRON 2 MG/ML
INJECTION INTRAMUSCULAR; INTRAVENOUS PRN
Status: DISCONTINUED | OUTPATIENT
Start: 2023-04-04 | End: 2023-04-04

## 2023-04-04 RX ORDER — GLYCOPYRROLATE 0.2 MG/ML
INJECTION, SOLUTION INTRAMUSCULAR; INTRAVENOUS PRN
Status: DISCONTINUED | OUTPATIENT
Start: 2023-04-04 | End: 2023-04-04

## 2023-04-04 RX ORDER — ONDANSETRON 2 MG/ML
4 INJECTION INTRAMUSCULAR; INTRAVENOUS EVERY 30 MIN PRN
Status: DISCONTINUED | OUTPATIENT
Start: 2023-04-04 | End: 2023-04-04 | Stop reason: HOSPADM

## 2023-04-04 RX ORDER — HYDROMORPHONE HCL IN WATER/PF 6 MG/30 ML
0.2 PATIENT CONTROLLED ANALGESIA SYRINGE INTRAVENOUS EVERY 5 MIN PRN
Status: DISCONTINUED | OUTPATIENT
Start: 2023-04-04 | End: 2023-04-04 | Stop reason: HOSPADM

## 2023-04-04 RX ORDER — LIDOCAINE 40 MG/G
CREAM TOPICAL
Status: DISCONTINUED | OUTPATIENT
Start: 2023-04-04 | End: 2023-04-04 | Stop reason: HOSPADM

## 2023-04-04 RX ORDER — CEFAZOLIN SODIUM 1 G/3ML
1 INJECTION, POWDER, FOR SOLUTION INTRAMUSCULAR; INTRAVENOUS EVERY 8 HOURS
Status: COMPLETED | OUTPATIENT
Start: 2023-04-05 | End: 2023-04-05

## 2023-04-04 RX ORDER — CHOLECALCIFEROL (VITAMIN D3) 50 MCG
50 TABLET ORAL DAILY
Status: DISCONTINUED | OUTPATIENT
Start: 2023-04-04 | End: 2023-04-07 | Stop reason: HOSPADM

## 2023-04-04 RX ADMIN — FENTANYL CITRATE 25 MCG: 50 INJECTION, SOLUTION INTRAMUSCULAR; INTRAVENOUS at 17:24

## 2023-04-04 RX ADMIN — SODIUM CHLORIDE 1000 ML: 9 INJECTION, SOLUTION INTRAVENOUS at 02:16

## 2023-04-04 RX ADMIN — TRANEXAMIC ACID 1 G: 10 INJECTION, SOLUTION INTRAVENOUS at 03:16

## 2023-04-04 RX ADMIN — PHENYLEPHRINE HYDROCHLORIDE 100 MCG: 10 INJECTION INTRAVENOUS at 17:14

## 2023-04-04 RX ADMIN — SENNOSIDES AND DOCUSATE SODIUM 1 TABLET: 50; 8.6 TABLET ORAL at 21:00

## 2023-04-04 RX ADMIN — LIDOCAINE HYDROCHLORIDE 80 MG: 20 INJECTION, SOLUTION INFILTRATION; PERINEURAL at 16:25

## 2023-04-04 RX ADMIN — SODIUM CHLORIDE, POTASSIUM CHLORIDE, SODIUM LACTATE AND CALCIUM CHLORIDE: 600; 310; 30; 20 INJECTION, SOLUTION INTRAVENOUS at 16:20

## 2023-04-04 RX ADMIN — ASPIRIN 81 MG: 81 TABLET, COATED ORAL at 21:00

## 2023-04-04 RX ADMIN — HYDROMORPHONE HYDROCHLORIDE 0.2 MG: 1 INJECTION, SOLUTION INTRAMUSCULAR; INTRAVENOUS; SUBCUTANEOUS at 02:43

## 2023-04-04 RX ADMIN — AMLODIPINE BESYLATE 5 MG: 5 TABLET ORAL at 08:10

## 2023-04-04 RX ADMIN — HYDROMORPHONE HYDROCHLORIDE 0.2 MG: 1 INJECTION, SOLUTION INTRAMUSCULAR; INTRAVENOUS; SUBCUTANEOUS at 08:09

## 2023-04-04 RX ADMIN — ROCURONIUM BROMIDE 40 MG: 50 INJECTION, SOLUTION INTRAVENOUS at 16:25

## 2023-04-04 RX ADMIN — NEOSTIGMINE METHYLSULFATE 3 MG: 1 INJECTION, SOLUTION INTRAVENOUS at 17:22

## 2023-04-04 RX ADMIN — ATORVASTATIN CALCIUM 80 MG: 40 TABLET, FILM COATED ORAL at 21:56

## 2023-04-04 RX ADMIN — HYDROMORPHONE HYDROCHLORIDE 1 MG: 2 TABLET ORAL at 04:51

## 2023-04-04 RX ADMIN — PHENYLEPHRINE HYDROCHLORIDE 100 MCG: 10 INJECTION INTRAVENOUS at 16:33

## 2023-04-04 RX ADMIN — ACETAMINOPHEN 975 MG: 325 TABLET, FILM COATED ORAL at 21:00

## 2023-04-04 RX ADMIN — GLYCOPYRROLATE 0.6 MG: 0.2 INJECTION, SOLUTION INTRAMUSCULAR; INTRAVENOUS at 17:22

## 2023-04-04 RX ADMIN — HYDROMORPHONE HYDROCHLORIDE 0.25 MG: 1 INJECTION, SOLUTION INTRAMUSCULAR; INTRAVENOUS; SUBCUTANEOUS at 17:30

## 2023-04-04 RX ADMIN — ONDANSETRON 4 MG: 2 INJECTION INTRAMUSCULAR; INTRAVENOUS at 17:18

## 2023-04-04 RX ADMIN — PROPOFOL 140 MG: 10 INJECTION, EMULSION INTRAVENOUS at 16:25

## 2023-04-04 RX ADMIN — HUMAN ALBUMIN MICROSPHERES AND PERFLUTREN 9 ML: 10; .22 INJECTION, SOLUTION INTRAVENOUS at 10:34

## 2023-04-04 RX ADMIN — SODIUM CHLORIDE, POTASSIUM CHLORIDE, SODIUM LACTATE AND CALCIUM CHLORIDE: 600; 310; 30; 20 INJECTION, SOLUTION INTRAVENOUS at 15:48

## 2023-04-04 RX ADMIN — HYDROMORPHONE HYDROCHLORIDE 2 MG: 2 TABLET ORAL at 11:49

## 2023-04-04 RX ADMIN — PHENYLEPHRINE HYDROCHLORIDE 100 MCG: 10 INJECTION INTRAVENOUS at 17:12

## 2023-04-04 RX ADMIN — FENTANYL CITRATE 50 MCG: 50 INJECTION, SOLUTION INTRAMUSCULAR; INTRAVENOUS at 16:20

## 2023-04-04 RX ADMIN — Medication 2 G: at 16:20

## 2023-04-04 RX ADMIN — HYDROMORPHONE HYDROCHLORIDE 2 MG: 2 TABLET ORAL at 08:48

## 2023-04-04 RX ADMIN — PHENYLEPHRINE HYDROCHLORIDE 100 MCG: 10 INJECTION INTRAVENOUS at 16:28

## 2023-04-04 RX ADMIN — PHENYLEPHRINE HYDROCHLORIDE 0.5 MCG/KG/MIN: 10 INJECTION INTRAVENOUS at 16:44

## 2023-04-04 ASSESSMENT — ACTIVITIES OF DAILY LIVING (ADL)
ADLS_ACUITY_SCORE: 30
ADLS_ACUITY_SCORE: 28
ADLS_ACUITY_SCORE: 37
ADLS_ACUITY_SCORE: 37
ADLS_ACUITY_SCORE: 28
ADLS_ACUITY_SCORE: 30
ADLS_ACUITY_SCORE: 28
ADLS_ACUITY_SCORE: 30
ADLS_ACUITY_SCORE: 30
ADLS_ACUITY_SCORE: 28

## 2023-04-04 NOTE — ANESTHESIA PREPROCEDURE EVALUATION
Anesthesia Pre-Procedure Evaluation    Patient: Inga Coats   MRN: [id number] : 1940        Procedure : Procedure(s):  RIGHT HIP HEMIARTHROPLASTY          Past Medical History:   Diagnosis Date     Chronic kidney disease      CVA (cerebral vascular accident) (H)      Hypertension      S/P AVR      S/P CABG (coronary artery bypass graft)     x3     SVT (supraventricular tachycardia) (H)       Past Surgical History:   Procedure Laterality Date     CARDIAC SURGERY        Allergies   Allergen Reactions     Shellfish-Derived Products Anaphylaxis     Fish-Derived Products       Social History     Tobacco Use     Smoking status: Never     Smokeless tobacco: Never   Vaping Use     Vaping status: Not on file   Substance Use Topics     Alcohol use: Not Currently      Wt Readings from Last 1 Encounters:   23 63.4 kg (139 lb 12.8 oz)        Anesthesia Evaluation   Pt has had prior anesthetic.     No history of anesthetic complications       ROS/MED HX  ENT/Pulmonary:    (-) sleep apnea   Neurologic:     (+) CVA,     Cardiovascular:     (+) Dyslipidemia hypertension--CAD -CABG--valvular problems/murmurs type: AS s/p AVR.     METS/Exercise Tolerance:     Hematologic:       Musculoskeletal:   (+) fracture, Fracture location: RLE,     GI/Hepatic:    (-) GERD   Renal/Genitourinary:     (+) renal disease, type: CRI,     Endo:    (-) Type II DM   Psychiatric/Substance Use:       Infectious Disease:       Malignancy:       Other:            Physical Exam    Airway        Mallampati: II   TM distance: > 3 FB   Neck ROM: full   Mouth opening: > 3 cm    Respiratory Devices and Support         Dental       (+) Minor Abnormalities - some fillings, tiny chips      Cardiovascular   cardiovascular exam normal          Pulmonary   pulmonary exam normal                OUTSIDE LABS:  CBC:   Lab Results   Component Value Date    WBC 7.0 2023    WBC 11.2 (H) 2023    HGB 10.0 (L) 2023    HGB 11.9 2023     HCT 29.8 (L) 04/04/2023    HCT 35.2 04/03/2023     04/04/2023     04/03/2023     BMP:   Lab Results   Component Value Date     (L) 04/04/2023     (L) 04/03/2023    POTASSIUM 4.4 04/04/2023    POTASSIUM 5.2 04/03/2023    CHLORIDE 104 04/04/2023    CHLORIDE 97 (L) 04/03/2023    CO2 23 04/04/2023    CO2 23 04/03/2023    BUN 16.3 04/04/2023    BUN 26.5 (H) 04/03/2023    CR 0.76 04/04/2023    CR 0.75 04/03/2023     (H) 04/04/2023     (H) 04/04/2023     COAGS:   Lab Results   Component Value Date    PTT 27 12/30/2022    INR 0.89 12/30/2022     POC: No results found for: BGM, HCG, HCGS  HEPATIC: No results found for: ALBUMIN, PROTTOTAL, ALT, AST, GGT, ALKPHOS, BILITOTAL, BILIDIRECT, RENAN  OTHER:   Lab Results   Component Value Date    A1C 6.0 (H) 12/30/2022    JAC 8.7 (L) 04/04/2023    MAG 2.3 04/03/2023       Anesthesia Plan    ASA Status:  3   NPO Status:  NPO Appropriate    Anesthesia Type: General.     - Airway: ETT   Induction: Intravenous, Propofol.   Maintenance: Balanced.        Consents    Anesthesia Plan(s) and associated risks, benefits, and realistic alternatives discussed. Questions answered and patient/representative(s) expressed understanding.    - Discussed:     - Discussed with:  Patient         Postoperative Care    Pain management: IV analgesics.   PONV prophylaxis: Ondansetron (or other 5HT-3), Background Propofol Infusion     Comments:    Other Comments: Took her plavix yesterday. Because of this, we will hold off on a nerve block.             Eunice Brennan MD, MD

## 2023-04-04 NOTE — ED NOTES
Pt states that she does not want to try going to the restroom right now.  Says to wait for 30 minutes

## 2023-04-04 NOTE — PLAN OF CARE
Goal Outcome Evaluation:  Trauma/Ortho/Medical (Choose one)  trauma  Diagnosis: right femoral neck fracture  Mental Status: A/Ox4  Activity/dangle bedrest  Diet: NPO except for medications  Pain: PRN PO Dilaudid with IV Dilaudid for breakthrough pain x1  Armenta/Voiding: armenta  Tele/Restraints/Iso: NSR with occasional PVC's   02/LDA: RA/SL  D/C Date: pending  Other Info: OR at 1610 - right hip hemiarthroplasty

## 2023-04-04 NOTE — ANESTHESIA CARE TRANSFER NOTE
Patient: Inga Coats    Procedure: Procedure(s):  RIGHT HIP HEMIARTHROPLASTY       Diagnosis: Closed fracture of neck of right femur, initial encounter (H) [S72.001A]  Diagnosis Additional Information: No value filed.    Anesthesia Type:   General     Note:    Oropharynx: oropharynx clear of all foreign objects  Level of Consciousness: awake  Oxygen Supplementation: face mask  Level of Supplemental Oxygen (L/min / FiO2): 6  Independent Airway: airway patency satisfactory and stable  Dentition: dentition unchanged  Vital Signs Stable: post-procedure vital signs reviewed and stable  Report to RN Given: handoff report given  Patient transferred to: PACU    Handoff Report: Identifed the Patient, Identified the Reponsible Provider, Reviewed the pertinent medical history, Discussed the surgical course, Reviewed Intra-OP anesthesia mangement and issues during anesthesia, Set expectations for post-procedure period and Allowed opportunity for questions and acknowledgement of understanding      Vitals:  Vitals Value Taken Time   BP     Temp     Pulse     Resp     SpO2         Electronically Signed By: DOMENICA Paz CRNA  April 4, 2023  5:34 PM

## 2023-04-04 NOTE — CONSULTS
[facility]    Orthopedic Consultation    Inga Coats MRN# [id number]   Age: 83 year old YOB: 1940     Date of Admission: 4/3/2023    Reason for consult: Right femoral neck fracture       Requesting physician: Betty Acevedo MD       Level of consult: Consult, follow and place orders           Assessment and Plan:   Assessment:   Acute, closed, displaced, and angulated right femoral neck fracture      Plan:   The patient's history and clinical/diagnostic findings were reviewed with the on-call orthopedic trauma surgeon, Dr. Reji Barraza. The patient experienced a syncopal episode that she is unable to recall that resulted in a scalp laceration on 4/3/23. She then fell shortly thereafter in her closet and was unable to stand or ambulate secondary to right hip pain. Radiographs and CT scan reveal a right femoral neck fracture with displacement and angulation. 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 Discussed recommendations for surgical intervention including a right hip hemiarthroplasty and its goals of pain control and maximizing mobility/functionality. Reviewed typical postoperative protocol, rehabilitation, and pain level expectations. Also briefly discussed benefits and risks of nonoperative management. The patient and her daughter wish to proceed with surgical intervention. Discussed with hospitalist, Dr. Mohan, who states that the patient is optimized for surgery and does not need to have an echocardiogram prior to the procedure. Will plan for a right hemiarthroplasty later today (4/4/23). Dr. Barraza will further discuss the risks, benefits, and outcomes of surgery while obtaining consent.     Surgeon: Dr. Reji Barraza  Remain NPO until postop.   NWB/bedrest until postop.  Continue pain regimen.  Muscle relaxant available for PRN use.  Continue to hold PTA Plavix and ASA until  "postop.  Vitamin D deficiency lab and supplementation ordered.  Type and screen ordered.    Please contact orthopedic trauma team if any questions or concerns arise.           Chief Complaint:   Right hip fracture         History of Present Illness:   Medical history obtained via chart review and discussion with the patient and her daughter. 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 On 4/3/23, the patient reportedly experienced a syncopal event that she is amnestic to and awoke to blood in her bathroom secondary to a scalp laceration. She was able to weight bear after this event. She then went to her closet and became dizzy and fell. She remembers this incident. The patient was unable to get up on her own and had immediate right hip pain. EMS was contacted and she was brought to [facility] ED. Here, radiographs and a CT scan demonstrated a right femoral neck fracture. Patient currently describes moderate to severe pain affecting her right hip and upper thigh. No significant spasms. Denies numbness and tingling. She reports ongoing weakness of the right side of her body following a CVA in December 2022. Denies prior injuries or surgeries to her right hip or knee. The patient lives alone in an apartment with her two cats. She drives herself places and prior to her CVA, did her own shopping. She continues to stay as active as possible and walks the hallways at her complex 3 times a day. She uses a cane for ambulation since the CVA. The patient is on PTA Plavix and ASA which have been held since admission. NPO since midnight.           Past Medical History:     Past Medical History:   Diagnosis Date     Chronic kidney disease      CVA (cerebral vascular accident) (H)      Hypertension      S/P AVR      S/P CABG (coronary artery bypass graft)     x3 "     SVT (supraventricular tachycardia) (H)              Past Surgical History:     Past Surgical History:   Procedure Laterality Date     CARDIAC SURGERY               Social History:     Social History     Tobacco Use     Smoking status: Never     Smokeless tobacco: Never   Vaping Use     Vaping status: Not on file   Substance Use Topics     Alcohol use: Not Currently             Family History:   No family history on file.          Immunizations:     VACCINE/DOSE   Diptheria   DPT   DTAP   HBIG   Hepatitis A   Hepatitis B   HIB   Influenza   Measles   Meningococcal   MMR   Mumps   Pneumococcal   Polio   Rubella   Small Pox   TDAP   Varicella   Zoster             Allergies:     Allergies   Allergen Reactions     Shellfish-Derived Products Anaphylaxis     Fish-Derived Products              Medications:     Current Facility-Administered Medications   Medication     0.9% sodium chloride BOLUS     acetaminophen (TYLENOL) tablet 975 mg     amLODIPine (NORVASC) tablet 5 mg     atorvastatin (LIPITOR) tablet 80 mg     ceFAZolin (ANCEF) 2 g in 100 mL D5W intermittent infusion     ceFAZolin (ANCEF) 2 g in 100 mL D5W intermittent infusion     HYDROmorphone (DILAUDID) half-tab 1 mg     HYDROmorphone (DILAUDID) injection 0.4 mg     HYDROmorphone (DILAUDID) tablet 2 mg     HYDROmorphone (PF) (DILAUDID) injection 0.2 mg     lidocaine (LMX4) cream     lidocaine 1 % 0.1-1 mL     melatonin tablet 1 mg     naloxone (NARCAN) injection 0.2 mg    Or     naloxone (NARCAN) injection 0.4 mg    Or     naloxone (NARCAN) injection 0.2 mg    Or     naloxone (NARCAN) injection 0.4 mg     ondansetron (ZOFRAN ODT) ODT tab 4 mg    Or     ondansetron (ZOFRAN) injection 4 mg     ropivacaine (NAROPIN) 5 MG/ mg, ketorolac (TORADOL) 30 mg, EPINEPHrine (ADRENALIN) 0.6 mg in sodium chloride 0.9 % 100 mL (ORTHO YUNIER STANDARD DOSE)     sodium chloride (PF) 0.9% PF flush 3 mL     sodium chloride (PF) 0.9% PF flush 3 mL     vitamin D3 (CHOLECALCIFEROL)  tablet 50 mcg             Review of Systems:   CV: NEGATIVE for chest pain, palpitations or peripheral edema  C: NEGATIVE for fever, chills, change in weight  E/M: NEGATIVE for ear, mouth and throat problems  R: NEGATIVE for significant cough or SOB          Physical Exam:   All vitals have been reviewed  Patient Vitals for the past 24 hrs:   BP Temp Temp src Pulse Resp SpO2   04/04/23 1116 128/55 98.4  F (36.9  C) Oral 72 16 96 %   04/04/23 0721 (!) 146/60 98.5  F (36.9  C) Oral 73 16 97 %   04/04/23 0206 139/65 99.2  F (37.3  C) Oral 76 16 97 %   04/04/23 0128 135/61 -- -- 76 13 98 %   04/03/23 2000 117/51 -- -- 80 15 --   04/03/23 1900 108/53 -- -- 75 15 --   04/03/23 1818 -- -- -- 85 20 98 %   04/03/23 1758 (!) 141/67 -- -- 87 10 98 %   04/03/23 1607 135/57 -- -- 79 19 98 %   04/03/23 1528 134/58 -- -- 68 14 97 %   04/03/23 1458 133/61 -- -- 74 11 98 %   04/03/23 1428 138/65 -- -- 79 16 98 %   04/03/23 1330 125/55 -- -- 75 19 94 %   04/03/23 1200 139/57 -- -- 77 16 92 %   04/03/23 1137 -- 98.1  F (36.7  C) Oral -- -- --       Intake/Output Summary (Last 24 hours) at 4/4/2023 1129  Last data filed at 4/3/2023 2355  Gross per 24 hour   Intake 1500 ml   Output --   Net 1500 ml       Constitutional: Pleasant, alert, appropriate, following commands.  HEENT: Head atraumatic normocephalic. Pupils equal round and reactive.  Respiratory: Unlabored breathing no audible wheeze  Cardiovascular: Regular rate and rhythm per pulses.  GI: Abdomen is non-distended.  Lymph/Hematologic: No lymphadenopathy in areas examined.  Genitourinary: No armenta  Skin: No rashes, no cyanosis, no edema.  Musculoskeletal: Right lower extremity: Shortened and externally rotated. Skin intact to visualized areas. Mild swelling of the right hip and thigh. No erythema or ecchymosis. Nontender over the distal third of the thigh, medial and lateral joint lines, and ankle/foot. Calf is soft and nontender. Thigh compartments are soft and compressible. Any  attempted repositioning of the right lower extremity causes right hip pain. Able to actively DF and PF the ankles. Able to actively wiggle toes. DP pulse palpable. Sensation grossly intact to light touch.  Neurologic: normal without focal findings, intact mental status, speech normal, alert and oriented x iii          Data:   All laboratory data reviewed  Results for orders placed or performed during the hospital encounter of 04/03/23   CT Head w/o Contrast     Status: None    Narrative    CT HEAD W/O CONTRAST 4/3/2023 12:30 PM    INDICATION: closed head injury. Head trauma.  TECHNIQUE: CT scan of the head without contrast. Dose reduction  techniques were used.  CONTRAST: None.  COMPARISON: 12/31/2022 head CT.    FINDINGS:   No intracranial hemorrhage, extraaxial collection, mass effect or CT  evidence of acute infarct.  Chronic lacunar infarct in the  periventricular left corona radiata, the left lentiform nucleus, and  left caudate head. Moderate burden of scattered chronic small vessel  ischemic change. Moderate generalized volume loss. The ventricles are  proportional to the sulci. No skull fracture. No scalp hematoma.  Unremarkable orbits. Paranasal sinuses are free of significant  disease. Clear mastoid air cells.      Impression    IMPRESSION:  Age-related changes and chronic ischemic changes as above with no  acute intracranial abnormality.    JAYDEN PETERSON MD         SYSTEM ID:  [id number]   CT Pelvis Bone wo Contrast     Status: None    Narrative    CT PELVIS BONE WO CONTRAST 4/3/2023 12:30 PM    INDICATION: right hip/pelvis pain after trauma  COMPARISON: None.    TECHNIQUE: Noncontrast. Axial, sagittal and coronal thin-section  reconstruction. Dose reduction techniques were used.   CONTRAST: None.    FINDINGS:   There is a mildly comminuted and impacted fracture through the right  femoral neck without evidence for intertrochanteric extension. No  evidence for dislocation. There is apex anterior angulation  at the  fracture, however. Superimposed degenerative change of the right hip  joint.    The left hip is negative for fracture or CT evidence of avascular  necrosis. The remainder the pelvis is negative for fracture.    No significant hematoma or fluid collection around the fracture. The  intrapelvic contents are negative reason      Impression    IMPRESSION:  1.  Mildly comminuted fracture of the right femoral neck with apex  anterior angulation. No evidence for intertrochanteric extension or  dislocation of the hip joint.  2.  Degenerative change of the right hip joint.  3.   Left hip negative for fracture.  4.  Pelvis negative for fracture.  5.  No significant hematoma or effusion.      BORIS LAMB MD         SYSTEM ID:  [id number]   XR Pelvis w Hip Right 1 View     Status: None    Narrative    EXAM: XR PELVIS AND HIP RIGHT 1 VIEW  LOCATION: [facility]  DATE/TIME: 4/3/2023 8:42 PM    INDICATION: FEMORAL NECK FRACTURE  COMPARISON: None.      Impression    IMPRESSION: Normal hip joint spaces and alignment. Acute displaced fracture of the right femoral neck near the subcapital region with superolateral displacement of the distal fragment by more than one half bone width and varus angulation.    Degenerative changes in lumbar spine.   Basic metabolic panel     Status: Abnormal   Result Value Ref Range    Sodium 132 (L) 136 - 145 mmol/L    Potassium 5.2 3.4 - 5.3 mmol/L    Chloride 97 (L) 98 - 107 mmol/L    Carbon Dioxide (CO2) 23 22 - 29 mmol/L    Anion Gap 12 7 - 15 mmol/L    Urea Nitrogen 26.5 (H) 8.0 - 23.0 mg/dL    Creatinine 0.75 0.51 - 0.95 mg/dL    Calcium 10.4 (H) 8.8 - 10.2 mg/dL    Glucose 171 (H) 70 - 99 mg/dL    GFR Estimate 78 >60 mL/min/1.73m2   Troponin T, High Sensitivity     Status: Normal   Result Value Ref Range    Troponin T, High Sensitivity 11 <=14 ng/L   UA with Microscopic reflex to Culture     Status: Abnormal    Specimen: Urine, Mc Catheter   Result Value Ref  Range    Color Urine Light Yellow Colorless, Straw, Light Yellow, Yellow    Appearance Urine Clear Clear    Glucose Urine Negative Negative mg/dL    Bilirubin Urine Negative Negative    Ketones Urine Negative Negative mg/dL    Specific Gravity Urine 1.020 1.003 - 1.035    Blood Urine Negative Negative    pH Urine 6.5 5.0 - 7.0    Protein Albumin Urine Negative Negative mg/dL    Urobilinogen Urine Normal Normal, 2.0 mg/dL    Nitrite Urine Negative Negative    Leukocyte Esterase Urine Negative Negative    Mucus Urine Present (A) None Seen /LPF    RBC Urine 1 <=2 /HPF    WBC Urine <1 <=5 /HPF    Squamous Epithelials Urine <1 <=1 /HPF    Narrative    Urine Culture not indicated   CBC with platelets and differential     Status: Abnormal   Result Value Ref Range    WBC Count 11.2 (H) 4.0 - 11.0 10e3/uL    RBC Count 4.24 3.80 - 5.20 10e6/uL    Hemoglobin 11.9 11.7 - 15.7 g/dL    Hematocrit 35.2 35.0 - 47.0 %    MCV 83 78 - 100 fL    MCH 28.1 26.5 - 33.0 pg    MCHC 33.8 31.5 - 36.5 g/dL    RDW 13.8 10.0 - 15.0 %    Platelet Count 304 150 - 450 10e3/uL    % Neutrophils 89 %    % Lymphocytes 6 %    % Monocytes 5 %    % Eosinophils 0 %    % Basophils 0 %    % Immature Granulocytes 0 %    NRBCs per 100 WBC 0 <1 /100    Absolute Neutrophils 10.0 (H) 1.6 - 8.3 10e3/uL    Absolute Lymphocytes 0.7 (L) 0.8 - 5.3 10e3/uL    Absolute Monocytes 0.6 0.0 - 1.3 10e3/uL    Absolute Eosinophils 0.0 0.0 - 0.7 10e3/uL    Absolute Basophils 0.0 0.0 - 0.2 10e3/uL    Absolute Immature Granulocytes 0.0 <=0.4 10e3/uL    Absolute NRBCs 0.0 10e3/uL   [place] Draw     Status: None    Narrative    The following orders were created for panel order [place] Draw.  Procedure                               Abnormality         Status                     ---------                               -----------         ------                     Extra Blue Top Tube[[id number]]                              Final result                 Please view results for these  tests on the individual orders.   Extra Blue Top Tube     Status: None   Result Value Ref Range    Hold Specimen JIC    Troponin T, High Sensitivity     Status: Abnormal   Result Value Ref Range    Troponin T, High Sensitivity 18 (H) <=14 ng/L   Magnesium     Status: Normal   Result Value Ref Range    Magnesium 2.3 1.7 - 2.3 mg/dL   Glucose by meter     Status: Abnormal   Result Value Ref Range    GLUCOSE BY METER POCT 100 (H) 70 - 99 mg/dL   Basic metabolic panel     Status: Abnormal   Result Value Ref Range    Sodium 135 (L) 136 - 145 mmol/L    Potassium 4.4 3.4 - 5.3 mmol/L    Chloride 104 98 - 107 mmol/L    Carbon Dioxide (CO2) 23 22 - 29 mmol/L    Anion Gap 8 7 - 15 mmol/L    Urea Nitrogen 16.3 8.0 - 23.0 mg/dL    Creatinine 0.76 0.51 - 0.95 mg/dL    Calcium 8.7 (L) 8.8 - 10.2 mg/dL    Glucose 115 (H) 70 - 99 mg/dL    GFR Estimate 76 >60 mL/min/1.73m2   Glucose by meter     Status: Abnormal   Result Value Ref Range    GLUCOSE BY METER POCT 121 (H) 70 - 99 mg/dL   CBC with platelets and differential     Status: Abnormal   Result Value Ref Range    WBC Count 7.0 4.0 - 11.0 10e3/uL    RBC Count 3.50 (L) 3.80 - 5.20 10e6/uL    Hemoglobin 10.0 (L) 11.7 - 15.7 g/dL    Hematocrit 29.8 (L) 35.0 - 47.0 %    MCV 85 78 - 100 fL    MCH 28.6 26.5 - 33.0 pg    MCHC 33.6 31.5 - 36.5 g/dL    RDW 14.3 10.0 - 15.0 %    Platelet Count 224 150 - 450 10e3/uL    % Neutrophils 78 %    % Lymphocytes 12 %    % Monocytes 10 %    % Eosinophils 0 %    % Basophils 0 %    % Immature Granulocytes 0 %    NRBCs per 100 WBC 0 <1 /100    Absolute Neutrophils 5.4 1.6 - 8.3 10e3/uL    Absolute Lymphocytes 0.9 0.8 - 5.3 10e3/uL    Absolute Monocytes 0.7 0.0 - 1.3 10e3/uL    Absolute Eosinophils 0.0 0.0 - 0.7 10e3/uL    Absolute Basophils 0.0 0.0 - 0.2 10e3/uL    Absolute Immature Granulocytes 0.0 <=0.4 10e3/uL    Absolute NRBCs 0.0 10e3/uL   EKG 12-lead, tracing only     Status: None   Result Value Ref Range    Systolic Blood Pressure  mmHg     Diastolic Blood Pressure  mmHg    Ventricular Rate 79 BPM    Atrial Rate 79 BPM    NH Interval 186 ms    QRS Duration 74 ms     ms    QTc 433 ms    P Axis 63 degrees    R AXIS 22 degrees    T Axis 112 degrees    Interpretation ECG       Sinus rhythm  Possible Left atrial enlargement  Cannot rule out Anterior infarct , age undetermined  Abnormal ECG  When compared with ECG of 30-DEC-2022 11:22,  Inverted T waves have replaced nonspecific T wave abnormality in Lateral leads  Confirmed by GENERATED REPORT, COMPUTER ([id number]),  Kennedi Pizano ([id number]) on 4/3/2023 2:09:00 PM     Adult Type and Screen     Status: None   Result Value Ref Range    ABO/RH(D) A POS     Antibody Screen Negative Negative    SPECIMEN EXPIRATION DATE [id number]    CBC with platelets differential     Status: Abnormal    Narrative    The following orders were created for panel order CBC with platelets differential.  Procedure                               Abnormality         Status                     ---------                               -----------         ------                     CBC with platelets and d...[[id number]]  Abnormal            Final result                 Please view results for these tests on the individual orders.   CBC with platelets differential     Status: Abnormal    Narrative    The following orders were created for panel order CBC with platelets differential.  Procedure                               Abnormality         Status                     ---------                               -----------         ------                     CBC with platelets and d...[[id number]]  Abnormal            Final result                 Please view results for these tests on the individual orders.   ABO/Rh type and screen     Status: None    Narrative    The following orders were created for panel order ABO/Rh type and screen.  Procedure                               Abnormality         Status                     ---------                                -----------         ------                     Adult Type and Screen[[id number]]                            Final result                 Please view results for these tests on the individual orders.          Attestation:  I have reviewed today's vital signs, notes, medications, labs and imaging with Dr. Reji Barraza.  Amount of time performed on this consult: 60 minutes.    Beverly Bynum PA-C  [facility]

## 2023-04-04 NOTE — PROGRESS NOTES
RECEIVING UNIT ED HANDOFF REVIEW    ED Nurse Handoff Report was reviewed by: Marianne Buckley RN on April 4, 2023 at 1:39 AM

## 2023-04-04 NOTE — PROGRESS NOTES
Pt is a 83 year old female who presented to the ER with her daughter for syncope at home. Within the ED, she was found to have acute mildly communited fracture of the R femoral neck with anterior angulation. Pt is alert and oriented x 4, IVF @ 100ml/hr, armenta cath decent output.  Pt is VSS on 2 liters of oxygen. Pt is on a NPO  Diet for schedule surgery this afternoon. Pain was managed with IV dilaudid and oral dilaudid. Discharge pending

## 2023-04-04 NOTE — PROGRESS NOTES
SW: Writer received a note that patient's family would prefer TCU locations in [place]. Writer met with daughter at bedside to discuss TCU process. Writer explained that when patient is back from surgery and has seen therapy, referrals will be sent out.     Pt/family was provided with the Medicare Compare list for SNF.  Discussed associated Medicare star ratings to assist with choice for referrals/discharge planning Yes. Education was given to pt/family that star ratings are updated/maintained by Medicare and can be reviewed by visiting www.medicare.gov Yes. Daughter pulled up medicare website and writer walked her through how to review it. Writer asked for 5 choices or more in order and explained that if needed, SW may need to send out more if beds are not available and she understood.    SW provided contact information and will continue to follow.    Lolita Barker, \[facility] [place] [facility]\""  Inpatient   [facility]

## 2023-04-04 NOTE — BRIEF OP NOTE
[facility]    Brief Operative Note    Pre-operative diagnosis: Closed fracture of neck of right femur, initial encounter (H) [S72.001A]  Post-operative diagnosis Same as pre-operative diagnosis    Procedure: Procedure(s):  RIGHT HIP HEMIARTHROPLASTY  Surgeon: Surgeon(s) and Role:     * Reji Barraza MD - Primary  Anesthesia: Choice with Block   Estimated Blood Loss: Less than 100 ml    Drains: None  Specimens: * No specimens in log *  Findings:   None.  Complications: None.  Implants:   Implant Name Type Inv. Item Serial No.  Lot No. LRB No. Used Action   IMP SPACER OSTEONICS DISTAL CEMENT 11MM 7852-0801 - [id number] Metallic Hardware/[place] IMP SPACER OSTEONICS DISTAL CEMENT 11MM 2775-0959  [employer] [id number] Right 1 Implanted   IMP STEM COMP STRK HIP 132DEG #6 6070-0625A - [id number] Total Joint Component/Insert IMP STEM COMP STRK HIP 132DEG #6 6070-0625A  [employer] [id number] Right 1 Implanted   BONE CEMENT STRK SIMPLEX P SPEEDSET 6192-1-001 - [id number] Cement, Bone BONE CEMENT STRK SIMPLEX P SPEEDSET 6192-1-001  [employer] [id number] Right 2 Implanted   IMP INSERT SLEEVE STRK UNITRAX C-TAPER +0MM 6942-7065 - [id number] Total Joint Component/Insert IMP INSERT SLEEVE STRK UNITRAX C-TAPER +0MM 6942-7-065  [employer] [id number] Right 1 Implanted   IMP HEAD STRK ENDO UNITRAX MODULAR 42MM - [id number] Total Joint Component/Insert IMP HEAD STRK ENDO UNITRAX MODULAR 42MM  [employer] [id number] Right 1 Implanted

## 2023-04-04 NOTE — ANESTHESIA PROCEDURE NOTES
Airway       Patient location during procedure: OR       Procedure Start/Stop Times: 4/4/2023 4:30 PM  Staff -        Anesthesiologist:  Parth Ellis DO       CRNA: Frieda Garg APRN CRNA       Other Anesthesia Staff: Ange Barahona       Performed By: CECELIA  Consent for Airway        Urgency: elective  Indications and Patient Condition       Indications for airway management: aviva-procedural       Induction type:intravenous       Mask difficulty assessment: 1 - vent by mask    Final Airway Details       Final airway type: endotracheal airway       Successful airway: ETT - single  Endotracheal Airway Details        ETT size (mm): 7.0       Cuffed: yes       Cuff volume (mL): 8       Successful intubation technique: video laryngoscopy       VL Blade Size: Glidescope 3       Grade View of Cords: 1       Adjucts: stylet       Position: Right       Measured from: gums/teeth       Secured at (cm): 21       Bite block used: None    Post intubation assessment        Placement verified by: capnometry, equal breath sounds and chest rise        Number of attempts at approach: 1       Secured with: pink tape       Ease of procedure: easy       Dentition: Intact and Unchanged    Medication(s) Administered   Medication Administration Time: 4/4/2023 4:30 PM

## 2023-04-04 NOTE — PROGRESS NOTES
[facility]    Medicine Progress Note - Hospitalist Service    Date of Admission:  4/3/2023    Assessment & Plan   Inga Coats is a 83 year old female who presented to the ER with her daughter for syncope at home. Within the ED, she was found to have acute mildly communited fracture of the R femoral neck with anterior angulation and will be admitted for further work up of her syncope and ortho consultation     Syncope  Scalp laceration  Patient lives alone and reports she was walking out of her kitchen when the next thing she remembers she was getting up from the ground. She can't described any further details around the fall. It sounds like she went to bed normally however when she woke up she noticed her bathroom was full of blood.  She doesn't have any memory of the bleeding after her initial fall. After she fell again into her closet but this time she remembers the fall. She denies palpitations or feeling dizzy. She doesn't believe she had LOC but she is poor historian. She does remember R hip pain. Within the ED, EKG reviewed and normal. Troponin negative. Exam notable for head laceration. CT head negative for acute bleed.      - continue tele  - obtain electrolytes, UA pending  - remove scalp sutures in 7-10 days     R femoral neck Fracture  Patient reports her R leg was painful before her initial fall.  - consult to ortho  - medically optimized for OR  - PRN pain medications     Hx of SVT  Notable episodes on heart monitor 01/03/2022  - follow on tele here     CAD s/p CABG x3  Hx of Aortic stenosis s/p AVR  Patient denies any chest pain or cardiac symptoms prior to her fall. No signs of heart failure. 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 Indication: Syncope- high cardiac risk (48 hours)  Code Status: Full Code      Clinically Significant Risk Factors           # Hypercalcemia: Highest Ca = 10.4 mg/dL in last 2 days, will monitor as appropriate                      Disposition Plan      Expected Discharge Date: 04/06/2023      Destination: inpatient rehabilitation facility            Tony Mohan MD  Hospitalist Service  [facility]  Securely message with [employer] (more info)  Text page via [employer] Paging/Directory   ______________________________________________________________________    Interval History   Pain difficult to control this AM, but by noon a bit more comfortable. Resting. Waiting for OR this PM.     Physical Exam   Vital Signs: Temp: 98.4  F (36.9  C) Temp src: Oral BP: 128/55 Pulse: 72   Resp: 16 SpO2: 96 % O2 Device: None (Room air) Oxygen Delivery: 2 LPM  Weight: 0 lbs 0 oz    Constitutional: Awake, alert, cooperative, no apparent distress  Respiratory: Clear to auscultation bilaterally, no crackles or wheezing  Cardiovascular: Regular rate and rhythm, crisp click, and no murmur noted  GI: Normal bowel sounds, soft, non-distended, non-tender  Skin/Integumen: No rashes, no cyanosis, no edema  Other: Right leg shortened, ext rotation.      Medical Decision Making       MANAGEMENT DISCUSSED with the following over the past 24 hours: ortho, RN, cc/sw   NOTE(S)/MEDICAL RECORDS REVIEWED over the past 24 hours: H&P, recent discharge summary for CVA, ortho notes, ED notes  Tests ORDERED & REVIEWED in  the past 24 hours:  - BMP  - CMP  Tests personally interpreted in the past 24 hours:  - pelvic ct showing right femur fx  Medical complexity over the past 24 hours:  - Parenteral (IV) CONTROLLED SUBSTANCES ordered      Data     I have personally reviewed the following data over the past 24 hrs:    7.0  \   10.0 (L)   / 224     135 (L) 104 16.3 /  115 (H)   4.4 23 0.76 \       Trop: 18 (H) BNP: N/A       Imaging results reviewed over the past 24 hrs:   Recent Results (from the past 24 hour(s))   XR Pelvis w Hip Right 1 View    Narrative    EXAM: XR PELVIS AND HIP RIGHT 1 VIEW  LOCATION: [facility]  DATE/TIME: 4/3/2023 8:42 PM    INDICATION: FEMORAL NECK FRACTURE  COMPARISON: None.      Impression    IMPRESSION: Normal hip joint spaces and alignment. Acute displaced fracture of the right femoral neck near the subcapital region with superolateral displacement of the distal fragment by more than one half bone width and varus angulation.    Degenerative changes in lumbar spine.   Echocardiogram Limited   Result Value    LVEF  65-70%    Narrative    [id number]  [id number]  [id number]  [id number]^JAKE^BRISSA^RADHA     [facility]  Echocardiography Laboratory  [address] [place] [address]     Name: AMARIS BAKER  MRN: [id number]  : 1940  Study Date: 2023 10:03 AM  Age: 83 yrs  Gender: Female  Patient Location: [facility]  Reason For Study: Syncope  Ordering Physician: BRISSA JOSEPH  Referring Physician: Joe Ramires  Performed By: Jenni Barragan     BSA: 1.7 m2  Height: 64 in  Weight: 139 lb  HR: 70  BP: 135/57 mmHg  ______________________________________________________________________________  Procedure  Limited Portable Echo Adult. Optison (NDC #[id number]) given intravenously.  ______________________________________________________________________________  Interpretation Summary     Left ventricular systolic function is normal.  The visual  ejection fraction is 65-70%.  No regional wall motion abnormalities noted.  The right ventricle is normal in size and function.  There is a bioprosthetic aortic valve.  Normal mean systolic gradient of 8 mmHg. Trace prosthetic valve regurgitation.  Mild mitral and tricuspid valve regurgitation.  Normal inferior vena cava.     No changes compared to study dated 12/31/2022.  ______________________________________________________________________________  Left Ventricle  The left ventricle is normal in size. There is normal left ventricular wall  thickness. Left ventricular systolic function is normal. The visual ejection  fraction is 65-70%. No regional wall motion abnormalities noted.     Right Ventricle  The right ventricle is normal in size and function.     Atria  Normal left atrial size. Right atrial size is normal. There is no color  Doppler evidence of an atrial shunt.     Mitral Valve  The mitral valve leaflets are mildly thickened. There is mild (1+) mitral  regurgitation. There is no mitral valve stenosis.     Tricuspid Valve  Normal tricuspid valve. There is mild (1+) tricuspid regurgitation. The right  ventricular systolic pressure is approximated at 39.9 mmHg plus the right  atrial pressure.     Aortic Valve  The mean AoV pressure gradient is 8.7 mmHg. There is a bioprosthetic aortic  valve. The prosthetic aortic valve is well-seated. The gradient is normal for  this prosthetic aortic valve.     Pulmonic Valve  Normal pulmonic valve. There is trace pulmonic valvular regurgitation. Normal  pulmonic valve velocity.     Vessels  The aortic root is normal size. Normal size ascending aorta. The inferior vena  cava was normal in size with preserved respiratory variability.     Pericardium  There is no pericardial effusion.     Rhythm  Sinus rhythm was noted.  ______________________________________________________________________________  MMode/2D Measurements & Calculations  LVOT diam: 2.1 cm  LVOT area: 3.4  cm2     Doppler Measurements & Calculations  MV E max renny: 83.6 cm/sec  MV A max renny: 90.6 cm/sec  MV E/A: 0.92  MV dec slope: 339.4 cm/sec2  MV dec time: 0.25 sec  Ao V2 max: 218.9 cm/sec  Ao max P.2 mmHg  Ao V2 mean: 137.6 cm/sec  Ao mean P.7 mmHg  Ao V2 VTI: 43.1 cm  IMANI(I,D): 1.6 cm2  IMANI(V,D): 1.5 cm2  Ao acc time: 0.10 sec  LV V1 max PG: 3.8 mmHg  LV V1 max: 97.5 cm/sec  LV V1 VTI: 20.8 cm  SV(LVOT): 70.9 ml  SI(LVOT): 42.3 ml/m2  TR max renny: 316.0 cm/sec  TR max P.9 mmHg  AV Renny Ratio (DI): 0.45  IMANI Index (cm2/m2): 0.98     ______________________________________________________________________________  Report approved by: Dr Lilliam Hendrix 2023 12:40 PM

## 2023-04-05 ENCOUNTER — APPOINTMENT (OUTPATIENT)
Dept: PHYSICAL THERAPY | Facility: CLINIC | Age: 83
DRG: 522 | End: 2023-04-05
Payer: COMMERCIAL

## 2023-04-05 LAB
GLUCOSE SERPL-MCNC: 89 MG/DL (ref 70–99)
HGB BLD-MCNC: 8.5 G/DL (ref 11.7–15.7)
POTASSIUM SERPL-SCNC: 3.8 MMOL/L (ref 3.4–5.3)

## 2023-04-05 PROCEDURE — 97530 THERAPEUTIC ACTIVITIES: CPT | Mod: GP | Performed by: PHYSICAL THERAPIST

## 2023-04-05 PROCEDURE — 250N000013 HC RX MED GY IP 250 OP 250 PS 637: Performed by: STUDENT IN AN ORGANIZED HEALTH CARE EDUCATION/TRAINING PROGRAM

## 2023-04-05 PROCEDURE — 250N000013 HC RX MED GY IP 250 OP 250 PS 637: Performed by: PHYSICIAN ASSISTANT

## 2023-04-05 PROCEDURE — 99232 SBSQ HOSP IP/OBS MODERATE 35: CPT | Performed by: STUDENT IN AN ORGANIZED HEALTH CARE EDUCATION/TRAINING PROGRAM

## 2023-04-05 PROCEDURE — 250N000011 HC RX IP 250 OP 636: Performed by: PHYSICIAN ASSISTANT

## 2023-04-05 PROCEDURE — 84132 ASSAY OF SERUM POTASSIUM: CPT | Performed by: STUDENT IN AN ORGANIZED HEALTH CARE EDUCATION/TRAINING PROGRAM

## 2023-04-05 PROCEDURE — 120N000001 HC R&B MED SURG/OB

## 2023-04-05 PROCEDURE — 85018 HEMOGLOBIN: CPT | Performed by: PHYSICIAN ASSISTANT

## 2023-04-05 PROCEDURE — 999N000111 HC STATISTIC OT IP EVAL DEFER: Performed by: OCCUPATIONAL THERAPIST

## 2023-04-05 PROCEDURE — 82947 ASSAY GLUCOSE BLOOD QUANT: CPT | Performed by: PHYSICIAN ASSISTANT

## 2023-04-05 PROCEDURE — 97161 PT EVAL LOW COMPLEX 20 MIN: CPT | Mod: GP | Performed by: PHYSICAL THERAPIST

## 2023-04-05 PROCEDURE — 36415 COLL VENOUS BLD VENIPUNCTURE: CPT | Performed by: PHYSICIAN ASSISTANT

## 2023-04-05 RX ORDER — OXYCODONE HYDROCHLORIDE 5 MG/1
5 TABLET ORAL EVERY 4 HOURS PRN
Qty: 20 TABLET | Refills: 0 | Status: SHIPPED | OUTPATIENT
Start: 2023-04-05

## 2023-04-05 RX ORDER — ONDANSETRON 4 MG/1
4 TABLET, ORALLY DISINTEGRATING ORAL EVERY 6 HOURS PRN
Qty: 5 TABLET | Refills: 0 | DISCHARGE
Start: 2023-04-05

## 2023-04-05 RX ORDER — POLYETHYLENE GLYCOL 3350 17 G/17G
17 POWDER, FOR SOLUTION ORAL DAILY
Qty: 510 G | Refills: 0 | DISCHARGE
Start: 2023-04-05

## 2023-04-05 RX ORDER — ACETAMINOPHEN 325 MG/1
650 TABLET ORAL EVERY 6 HOURS PRN
Qty: 100 TABLET | Refills: 0 | DISCHARGE
Start: 2023-04-05 | End: 2023-04-20

## 2023-04-05 RX ORDER — HYDROXYZINE HYDROCHLORIDE 10 MG/1
10 TABLET, FILM COATED ORAL EVERY 6 HOURS PRN
Qty: 20 TABLET | Refills: 0 | DISCHARGE
Start: 2023-04-05 | End: 2023-04-25

## 2023-04-05 RX ORDER — AMOXICILLIN 250 MG
1 CAPSULE ORAL 2 TIMES DAILY
Qty: 21 TABLET | Refills: 0 | DISCHARGE
Start: 2023-04-05

## 2023-04-05 RX ADMIN — ACETAMINOPHEN 975 MG: 325 TABLET, FILM COATED ORAL at 20:31

## 2023-04-05 RX ADMIN — CEFAZOLIN 1 G: 1 INJECTION, POWDER, FOR SOLUTION INTRAMUSCULAR; INTRAVENOUS at 08:57

## 2023-04-05 RX ADMIN — CEFAZOLIN 1 G: 1 INJECTION, POWDER, FOR SOLUTION INTRAMUSCULAR; INTRAVENOUS at 00:43

## 2023-04-05 RX ADMIN — ATORVASTATIN CALCIUM 80 MG: 40 TABLET, FILM COATED ORAL at 20:31

## 2023-04-05 RX ADMIN — HYDROMORPHONE HYDROCHLORIDE 1 MG: 2 TABLET ORAL at 15:19

## 2023-04-05 RX ADMIN — AMLODIPINE BESYLATE 5 MG: 5 TABLET ORAL at 08:40

## 2023-04-05 RX ADMIN — Medication 50 MCG: at 08:40

## 2023-04-05 RX ADMIN — SENNOSIDES AND DOCUSATE SODIUM 1 TABLET: 50; 8.6 TABLET ORAL at 20:32

## 2023-04-05 RX ADMIN — ASPIRIN 81 MG: 81 TABLET, COATED ORAL at 20:32

## 2023-04-05 RX ADMIN — ASPIRIN 81 MG: 81 TABLET, COATED ORAL at 08:40

## 2023-04-05 RX ADMIN — ACETAMINOPHEN 975 MG: 325 TABLET, FILM COATED ORAL at 11:24

## 2023-04-05 RX ADMIN — ACETAMINOPHEN 975 MG: 325 TABLET, FILM COATED ORAL at 04:10

## 2023-04-05 RX ADMIN — SENNOSIDES AND DOCUSATE SODIUM 1 TABLET: 50; 8.6 TABLET ORAL at 08:40

## 2023-04-05 RX ADMIN — POLYETHYLENE GLYCOL 3350 17 G: 17 POWDER, FOR SOLUTION ORAL at 08:40

## 2023-04-05 ASSESSMENT — ACTIVITIES OF DAILY LIVING (ADL)
ADLS_ACUITY_SCORE: 30

## 2023-04-05 NOTE — PLAN OF CARE
Problem: Plan of Care - These are the overarching goals to be used throughout the patient stay.    Goal: Plan of Care Review  Description: The Plan of Care Review/Shift note should be completed every shift.  The Outcome Evaluation is a brief statement about your assessment that the patient is improving, declining, or no change.  This information will be displayed automatically on your shift note.  Outcome: Progressing     Problem: Hip Fracture Surgical Repair  Goal: Optimal Coping with Change in Health Status  Outcome: Progressing   Goal Outcome Evaluation:       Patient is doing pretty well.  Pain is well controlled with scheduled Tylenol.  Refusing ice.  Patient got up to chair with therapy needing an assist of 2.  Vitals stable on room air.  Mc in place and draining.  Hip precautions ordered.  Continue to monitor.

## 2023-04-05 NOTE — PLAN OF CARE
Goal Outcome Evaluation:    A&O X4. VSS on 1L O2. Patient not OOB this shift. Mc catheter in place. Surgical dressing to hip is CDI. CMS intact. Scheduled Tylenol administered for pain. IV infusing. Discharge pending.

## 2023-04-05 NOTE — CONSULTS
Care Management Initial Consult    General Information  Assessment completed with: Children, daughter Prachi  Type of CM/SW Visit: Initial Assessment    Primary Care Provider verified and updated as needed: Yes   Readmission within the last 30 days:        Reason for Consult: discharge planning  Advance Care Planning: Advance Care Planning Reviewed:  (no ACP documents)          Communication Assessment  Patient's communication style: spoken language (English or Bilingual)    Hearing Difficulty or Deaf: no   Wear Glasses or Blind: yes    Cognitive  Cognitive/Neuro/Behavioral: WDL     Arousal Level: arouses to voice  Orientation: oriented x 4             Living Environment:   People in home: alone     Current living Arrangements: apartment      Able to return to prior arrangements: no       Family/Social Support:  Care provided by: self  Provides care for: no one  Marital Status:   Children          Description of Support System: Supportive, Involved    Support Assessment: Adequate family and caregiver support, Adequate social supports    Current Resources:   Patient receiving home care services: No     Community Resources:    Equipment currently used at home: cane, straight  Supplies currently used at home:      Employment/Financial:  Employment Status: retired        Financial Concerns: No concerns identified   Referral to Financial Worker: No       Lifestyle & Psychosocial Needs:  Social Determinants of Health     Tobacco Use: Low Risk  (4/5/2023)    Patient History      Smoking Tobacco Use: Never      Smokeless Tobacco Use: Never      Passive Exposure: Not on file   Alcohol Use: Not on file   Financial Resource Strain: Not on file   Food Insecurity: Not on file   Transportation Needs: Not on file   Physical Activity: Not on file   Stress: Not on file   Social Connections: Not on file   Intimate Partner Violence: Not on file   Depression: Not on file   Housing Stability: Not on file       Functional  Status:  Prior to admission patient needed assistance:              Mental Health Status:          Chemical Dependency Status:                Values/Beliefs:  Spiritual, Cultural Beliefs, Episcopal Practices, Values that affect care: no               Additional Information:  Per care management/social work consult for discharge planning and TCU placement on discharge.  Patient was admitted on 4-3-23 and was found to have a right femoral neck fracture.  The tentative date of discharge is 4-6-23 or 4-7-23.  Reviewed chart and spoke with patient's daughter Prachi regarding discharge plans.  Per patient's daughter's report, patient lives alone in an apartment with elevator access.  Patient is independent at baseline.  Patient uses a straight cane at baseline.  Reviewed the therapy discharge recommendations of TCU placement on discharge and patient's daughter is in agreement.  Patient's daughter is asking for a referral to be sent to [facility] ([facility]  She states that her sister has reached out to them and they said that they may have a bed.  Referral sent, via the discharge navigator, to check bed availability.    Will continue to follow.      PEDRO Hooks, [facility]    [phone]  [facility]

## 2023-04-05 NOTE — PLAN OF CARE
Goal Outcome Evaluation:         Notified provider about indwelling armenta catheter discussed removal or continued need.    Did provider choose to remove indwelling armenta catheter? No    Provider's armenta indication for keeping indwelling armenta catheter: Retention.    Is there an order for indwelling armenta catheter? Yes    *If there is a plan to keep armenta catheter in place at discharge daily notification with provider is not necessary, but please add a notation in the treatment team sticky note that the patient will be discharging with the catheter.

## 2023-04-05 NOTE — PROVIDER NOTIFICATION
MD Notification    Notified Person: MD    Notified Person Name: Dr. Mohan    Notification Date/Time: 3:46 PM    Notification Interaction: Text page    Purpose of Notification: Want to keep armenta in place?     Orders Received:    Comments:

## 2023-04-05 NOTE — ANESTHESIA POSTPROCEDURE EVALUATION
Patient: Inga Coats    Procedure: Procedure(s):  RIGHT HIP HEMIARTHROPLASTY       Anesthesia Type:  General    Note:  Disposition: Inpatient   Postop Pain Control: Uneventful            Sign Out: Well controlled pain   PONV: No   Neuro/Psych: Uneventful            Sign Out: Acceptable/Baseline neuro status   Airway/Respiratory: Uneventful            Sign Out: Acceptable/Baseline resp. status   CV/Hemodynamics: Uneventful            Sign Out: Acceptable CV status; No obvious hypovolemia; No obvious fluid overload   Other NRE: NONE   DID A NON-ROUTINE EVENT OCCUR?            Last vitals:  Vitals Value Taken Time   /59 04/04/23 1850   Temp 36.3  C (97.3  F) 04/04/23 1830   Pulse 77 04/04/23 1850   Resp 10 04/04/23 1849   SpO2 95 % 04/04/23 1850   Vitals shown include unvalidated device data.    Electronically Signed By: Parht Ellis DO, DO  April 4, 2023  9:26 PM

## 2023-04-05 NOTE — PLAN OF CARE
Summary: [id number] 4/4/23  Orientation: A/Ox4  Activity Level: Ax2 GB/W  Fall Risk: yes  Behavior & Aggression Tool Color: green  Pain Management: denies  ABNL VS/O2: Vss on 2L NC  ABNL Lab/BG: n/a  Diet: reg  Bowel/Bladder: armenta cath  Drains/Devices: PIV infusing LR at 75 ml/hr  Tests/Procedures for next shift: 4/5 1100 PT  Anticipated DC date: TBD  Other Important Info:

## 2023-04-05 NOTE — PROGRESS NOTES
04/05/23 1120   Appointment Info   Signing Clinician's Name / Credentials (PT) Breanne Sheridan DPT   Living Environment   People in Home alone   Current Living Arrangements apartment   Home Accessibility no concerns  (Elevator Access)   Transportation Anticipated car, drives self   Living Environment Comments Pt is independent at baseline.   Self-Care   Usual Activity Tolerance good   Current Activity Tolerance moderate   Equipment Currently Used at Home cane, straight   Fall history within last six months yes   Number of times patient has fallen within last six months 1   General Information   Onset of Illness/Injury or Date of Surgery 04/03/23   Referring Physician Tony Mohan MD   Pertinent History of Current Problem (include personal factors and/or comorbidities that impact the POC) 84 y/o female POD #1 R hip hemiarthroplasty secondary to a fall due to syncopal episode. PMH including recent L corona radiata CVA 01/23.   Existing Precautions/Restrictions fall;no hip IR;90 degree hip flexion   Weight-Bearing Status - RLE weight-bearing as tolerated   General Observations Pt in supine upon arrival of therapist.   Cognition   Affect/Mental Status (Cognition) sad/depressed affect   Orientation Status (Cognition) oriented x 3   Follows Commands (Cognition) WFL   Cognitive Status Comments Per discussion with pt's daughter, noted pt to be forgetful at times. Pt had difficulty recalling her cats' names.   Pain Assessment   Patient Currently in Pain   (R hip pain/discomfort with movement, pt denied pain at rest.)   Integumentary/Edema   Integumentary/Edema Comments R hip incision not observed.   Posture    Posture Comments Noted forward head and shoulder posture sitting EOB and standing at FWW.   Range of Motion (ROM)   ROM Comment Limited R hip ROM d/t pain and hip precautions, otherwise B LEs WFL.   Strength (Manual Muscle Testing)   Strength Comments Noted R LE weakness at baseline d/t prior CVA.  Noted R ankle dorsiflexion weakness. Pt utilizes brace on R foot to compensate for foot drop.   Bed Mobility   Comment, (Bed Mobility) Supine-sit with modA of 2.   Transfers   Comment, (Transfers) Sit <> stand with FWW and Bonifacio of 2.   Gait/Stairs (Locomotion)   Comment, (Gait/Stairs) Pt amb a couple steps towards bedside chair with use of FWW and Bonifacio.   Balance   Balance Comments Noted good seated balance and fair standing balance at FWW.   Clinical Impression   Criteria for Skilled Therapeutic Intervention Yes, treatment indicated   PT Diagnosis (PT) Difficulty with functional mobility.   Influenced by the following impairments Pain, Impaired R hip ROM, Decreased strength, Decreased activity tolerance   Functional limitations due to impairments Limited functional mobility requiring AD and assist.   Clinical Presentation (PT Evaluation Complexity) Stable/Uncomplicated   Clinical Presentation Rationale Based on PMH, current presentation, and social support.   Clinical Decision Making (Complexity) low complexity   Planned Therapy Interventions (PT) balance training;bed mobility training;cryotherapy;gait training;ROM (range of motion);strengthening;transfer training   Anticipated Equipment Needs at Discharge (PT) walker, rolling   Risk & Benefits of therapy have been explained patient   PT Total Evaluation Time   PT Eval, Low Complexity Minutes (52240) 15   Plan of Care Review   Plan of Care Reviewed With patient   Physical Therapy Goals   PT Frequency Daily   PT Predicted Duration/Target Date for Goal Attainment 04/09/23   PT Goals Bed Mobility;Transfers;Gait   PT: Bed Mobility Supervision/stand-by assist;Supine to/from sit;Within precautions   PT: Transfers Supervision/stand-by assist;Sit to/from stand;Assistive device;Within precautions   PT: Gait Minimal assist;Rolling walker;50 feet;Within precautions   Therapeutic Procedure/Exercise   Ther. Procedure: strength, endurance, ROM, flexibillity Minutes (26983) 5    Symptoms Noted During/After Treatment fatigue;increased pain   Treatment Detail/Skilled Intervention PT: Pt performed supine exercises x 10 reps with cues for technique: ankle pumps and quad sets, L SLR x 5 reps.   Therapeutic Activity   Therapeutic Activities: dynamic activities to improve functional performance Minutes (80956) 25   Symptoms Noted During/After Treatment Fatigue;Increased pain   Treatment Detail/Skilled Intervention PT: Educated pt on role and POC of PT. Educated pt on WBAT. Pt required much encouragement to participate in PT session as pt is very fearful to participate in OOB mobility. Pt performed supine-sit with modA of 2, 1-step cues for sequencing. Pt tolerated sitting EOB with SBA. Sit <> stand with FWW and Bonifacio of 2, cues provided for hand placement and upright posture. Pt amb a couple steps from EOB to bedside recliner with FWW and Bonifacio, pt required continuous 1 step cues for sequencing. Pt sitting up in chair at end of session, chair alarm on and needs within reach.   PT Discharge Planning   PT Plan Progress transfers, gait with FWW, LE ex/ROM   PT Discharge Recommendation (DC Rec) Transitional Care Facility   PT Rationale for DC Rec Pt is below baseline and will benefit from continued skilled PT intervention at TCU to progress independence and safety with mobility.   PT Brief overview of current status modA of 2 for bed mobility, Bonifacio of 2 for sit <> stand with FWW, Bonifacio to amb a couple steps from EOB to chair   Total Session Time   Timed Code Treatment Minutes 30   Total Session Time (sum of timed and untimed services) 45

## 2023-04-05 NOTE — PLAN OF CARE
Occupational Therapy: Orders received. Chart reviewed and discussed with care team, including IP PT. Chart indicates plans for TCU and per discussion with IP PT, pt with limited tolerance for two therapy disciplines. All therapy needs are being met with IP PT. Will defer therapy recommendations to IP PT. Will complete orders.

## 2023-04-05 NOTE — PROGRESS NOTES
[facility]    Medicine Progress Note - Hospitalist Service    Date of Admission:  4/3/2023    Assessment & Plan   Inga Coats is a 83 year old female who presented to the ER with her daughter for syncope at home. Within the ED, she was found to have acute mildly communited fracture of the R femoral neck with anterior angulation and will be admitted for further work up of her syncope and ortho consultation     Syncope  Scalp laceration  Patient lives alone and reports she was walking out of her kitchen when the next thing she remembers she was getting up from the ground. She can't described any further details around the fall. It sounds like she went to bed normally however when she woke up she noticed her bathroom was full of blood.  She doesn't have any memory of the bleeding after her initial fall. After she fell again into her closet but this time she remembers the fall. She denies palpitations or feeling dizzy. She doesn't believe she had LOC but she is poor historian. She does remember R hip pain. Within the ED, EKG reviewed and normal. Troponin negative. Exam notable for head laceration. CT head negative for acute bleed.      - discontinue tele. No events reported. Suspect fall related to baseline ambulation difficulties post CVA  - remove scalp sutures in 7-10 days     R femoral neck Fracture  S/p Right hip hemiarthroplasty 4/4/23  Patient reports her R leg was painful before her initial fall.  - consult to ortho  - post op anticoagulation per ortho  - PRN pain medications  - PT/OT     Hx of SVT  Notable episodes on heart monitor 01/03/2022  - follow on tele here     CAD s/p CABG x3  Hx of Aortic stenosis s/p AVR  Patient denies any chest pain or cardiac symptoms prior to her fall. No signs of heart failure. 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  - residual R sided weakness  - hold asa, plavix, resume when cleared by surgery  - resume statin     CKD  - stable at baseline     Hyponatremia  Hypercalcemia  - will give small fluid bolus  - monitor AM labs     HTN  - resume PTA amlodipine     HLD  - resume PTA statin          Diet: Advance Diet as Tolerated: Regular Diet Adult    DVT Prophylaxis: Pneumatic Compression Devices  Mc Catheter: PRESENT, indication: Retention  Lines: None     Cardiac Monitoring: None  Code Status: Full Code      Clinically Significant Risk Factors           # Hypercalcemia: Highest Ca = 10.4 mg/dL in last 2 days, will monitor as appropriate                      Disposition Plan     Expected Discharge Date: 04/06/2023      Destination: inpatient rehabilitation facility            Tony Mohan MD  Hospitalist Service  [facility]  Securely message with [employer] (more info)  Text page via [employer] Paging/Directory   ______________________________________________________________________    Interval History   Seen in AM. She is resistant to starting therapies. Discussed this is absolutely necessary for recovery.     Physical Exam   Vital Signs: Temp: 98.2  F (36.8  C) Temp src: Oral BP: 126/40 Pulse: 57   Resp: 22 SpO2: 95 % O2 Device: None (Room air) Oxygen Delivery: 1 LPM  Weight: 135 lbs 0 oz    Constitutional: Awake, alert, cooperative, no apparent distress  Respiratory: Clear to auscultation bilaterally, no crackles or wheezing  Cardiovascular: Regular rate and rhythm, crisp click, and no murmur noted  GI: Normal bowel sounds, soft, non-distended, non-tender  Skin/Integumen: No rashes, no cyanosis, no edema  Other: Right hip dressed.       Medical Decision Making       35 MINUTES SPENT BY ME on the date of service doing chart review, history, exam, documentation & further activities per the note.      Data     I have personally reviewed the following data over the past 24  hrs:    N/A  \   8.5 (L)   / N/A     N/A N/A N/A /  N/A   N/A N/A N/A \       Imaging results reviewed over the past 24 hrs:   Recent Results (from the past 24 hour(s))   Echocardiogram Limited   Result Value    LVEF  65-70%    [facility]    [id number]  [id number]  [id number]  [id number]^JAKE^BRISSA^RADHA     [facility]  Echocardiography Laboratory  [facility] [place] [address]     Name: AMARIS BAKER  MRN: [id number]  : 1940  Study Date: 2023 10:03 AM  Age: 83 yrs  Gender: Female  Patient Location: [facility]  Reason For Study: Syncope  Ordering Physician: BRISSA JOSEPH  Referring Physician: Joe Ramires  Performed By: Jenni Barragan     BSA: 1.7 m2  Height: 64 in  Weight: 139 lb  HR: 70  BP: 135/57 mmHg  ______________________________________________________________________________  Procedure  Limited Portable Echo Adult. Optison (NDC #[id number]) given intravenously.  ______________________________________________________________________________  Interpretation Summary     Left ventricular systolic function is normal.  The visual ejection fraction is 65-70%.  No regional wall motion abnormalities noted.  The right ventricle is normal in size and function.  There is a bioprosthetic aortic valve.  Normal mean systolic gradient of 8 mmHg. Trace prosthetic valve regurgitation.  Mild mitral and tricuspid valve regurgitation.  Normal inferior vena cava.     No changes compared to study dated 2022.  ______________________________________________________________________________  Left Ventricle  The left ventricle is normal in size. There is normal left ventricular wall  thickness. Left ventricular systolic function is normal. The visual ejection  fraction is 65-70%. No regional wall motion abnormalities noted.     Right Ventricle  The right ventricle is normal in size and function.     Atria  Normal left atrial size. Right atrial size is normal. There is no  color  Doppler evidence of an atrial shunt.     Mitral Valve  The mitral valve leaflets are mildly thickened. There is mild (1+) mitral  regurgitation. There is no mitral valve stenosis.     Tricuspid Valve  Normal tricuspid valve. There is mild (1+) tricuspid regurgitation. The right  ventricular systolic pressure is approximated at 39.9 mmHg plus the right  atrial pressure.     Aortic Valve  The mean AoV pressure gradient is 8.7 mmHg. There is a bioprosthetic aortic  valve. The prosthetic aortic valve is well-seated. The gradient is normal for  this prosthetic aortic valve.     Pulmonic Valve  Normal pulmonic valve. There is trace pulmonic valvular regurgitation. Normal  pulmonic valve velocity.     Vessels  The aortic root is normal size. Normal size ascending aorta. The inferior vena  cava was normal in size with preserved respiratory variability.     Pericardium  There is no pericardial effusion.     Rhythm  Sinus rhythm was noted.  ______________________________________________________________________________  MMode/2D Measurements & Calculations  LVOT diam: 2.1 cm  LVOT area: 3.4 cm2     Doppler Measurements & Calculations  MV E max renny: 83.6 cm/sec  MV A max renny: 90.6 cm/sec  MV E/A: 0.92  MV dec slope: 339.4 cm/sec2  MV dec time: 0.25 sec  Ao V2 max: 218.9 cm/sec  Ao max P.2 mmHg  Ao V2 mean: 137.6 cm/sec  Ao mean P.7 mmHg  Ao V2 VTI: 43.1 cm  IMANI(I,D): 1.6 cm2  IMANI(V,D): 1.5 cm2  Ao acc time: 0.10 sec  LV V1 max PG: 3.8 mmHg  LV V1 max: 97.5 cm/sec  LV V1 VTI: 20.8 cm  SV(LVOT): 70.9 ml  SI(LVOT): 42.3 ml/m2  TR max renny: 316.0 cm/sec  TR max P.9 mmHg  AV Renny Ratio (DI): 0.45  IMANI Index (cm2/m2): 0.98     ______________________________________________________________________________  Report approved by: Dr Lilliam Hendrix 2023 12:40 PM         XR Pelvis Port 1/2 Views    Narrative    EXAM: XR PELVIS PORT 1/2 VIEWS  LOCATION: [facility]  DATE/TIME: 2023 6:11  PM    INDICATION: Status post Hip surgery  COMPARISON: None.      Impression    IMPRESSION: Postop changes of a recent right femoral hemiarthroplasty. No fracture.

## 2023-04-06 ENCOUNTER — APPOINTMENT (OUTPATIENT)
Dept: PHYSICAL THERAPY | Facility: CLINIC | Age: 83
DRG: 522 | End: 2023-04-06
Payer: COMMERCIAL

## 2023-04-06 LAB
GLUCOSE SERPL-MCNC: 100 MG/DL (ref 70–99)
HGB BLD-MCNC: 8.4 G/DL (ref 11.7–15.7)
POTASSIUM SERPL-SCNC: 3.6 MMOL/L (ref 3.4–5.3)

## 2023-04-06 PROCEDURE — 250N000013 HC RX MED GY IP 250 OP 250 PS 637: Performed by: PHYSICIAN ASSISTANT

## 2023-04-06 PROCEDURE — 97110 THERAPEUTIC EXERCISES: CPT | Mod: GP

## 2023-04-06 PROCEDURE — 85018 HEMOGLOBIN: CPT | Performed by: PHYSICIAN ASSISTANT

## 2023-04-06 PROCEDURE — 84132 ASSAY OF SERUM POTASSIUM: CPT | Performed by: STUDENT IN AN ORGANIZED HEALTH CARE EDUCATION/TRAINING PROGRAM

## 2023-04-06 PROCEDURE — 250N000013 HC RX MED GY IP 250 OP 250 PS 637: Performed by: STUDENT IN AN ORGANIZED HEALTH CARE EDUCATION/TRAINING PROGRAM

## 2023-04-06 PROCEDURE — 120N000001 HC R&B MED SURG/OB

## 2023-04-06 PROCEDURE — 36415 COLL VENOUS BLD VENIPUNCTURE: CPT | Performed by: PHYSICIAN ASSISTANT

## 2023-04-06 PROCEDURE — 99232 SBSQ HOSP IP/OBS MODERATE 35: CPT | Performed by: STUDENT IN AN ORGANIZED HEALTH CARE EDUCATION/TRAINING PROGRAM

## 2023-04-06 PROCEDURE — 82947 ASSAY GLUCOSE BLOOD QUANT: CPT | Performed by: STUDENT IN AN ORGANIZED HEALTH CARE EDUCATION/TRAINING PROGRAM

## 2023-04-06 PROCEDURE — 0SRR019 REPLACEMENT OF RIGHT HIP JOINT, FEMORAL SURFACE WITH METAL SYNTHETIC SUBSTITUTE, CEMENTED, OPEN APPROACH: ICD-10-PCS | Performed by: ORTHOPAEDIC SURGERY

## 2023-04-06 PROCEDURE — 97530 THERAPEUTIC ACTIVITIES: CPT | Mod: GP

## 2023-04-06 RX ADMIN — HYDROMORPHONE HYDROCHLORIDE 1 MG: 2 TABLET ORAL at 17:55

## 2023-04-06 RX ADMIN — ASPIRIN 81 MG: 81 TABLET, COATED ORAL at 20:21

## 2023-04-06 RX ADMIN — HYDROMORPHONE HYDROCHLORIDE 1 MG: 2 TABLET ORAL at 10:35

## 2023-04-06 RX ADMIN — ASPIRIN 81 MG: 81 TABLET, COATED ORAL at 08:54

## 2023-04-06 RX ADMIN — ACETAMINOPHEN 975 MG: 325 TABLET, FILM COATED ORAL at 20:20

## 2023-04-06 RX ADMIN — Medication 50 MCG: at 08:54

## 2023-04-06 RX ADMIN — AMLODIPINE BESYLATE 5 MG: 5 TABLET ORAL at 08:54

## 2023-04-06 RX ADMIN — ATORVASTATIN CALCIUM 80 MG: 40 TABLET, FILM COATED ORAL at 20:21

## 2023-04-06 RX ADMIN — ACETAMINOPHEN 975 MG: 325 TABLET, FILM COATED ORAL at 03:44

## 2023-04-06 RX ADMIN — POLYETHYLENE GLYCOL 3350 17 G: 17 POWDER, FOR SOLUTION ORAL at 08:53

## 2023-04-06 RX ADMIN — SENNOSIDES AND DOCUSATE SODIUM 1 TABLET: 50; 8.6 TABLET ORAL at 08:54

## 2023-04-06 RX ADMIN — ACETAMINOPHEN 975 MG: 325 TABLET, FILM COATED ORAL at 13:10

## 2023-04-06 ASSESSMENT — ACTIVITIES OF DAILY LIVING (ADL)
ADLS_ACUITY_SCORE: 36
ADLS_ACUITY_SCORE: 36
ADLS_ACUITY_SCORE: 30
ADLS_ACUITY_SCORE: 30
ADLS_ACUITY_SCORE: 36
ADLS_ACUITY_SCORE: 30
ADLS_ACUITY_SCORE: 30
ADLS_ACUITY_SCORE: 36
ADLS_ACUITY_SCORE: 30
ADLS_ACUITY_SCORE: 36

## 2023-04-06 NOTE — PLAN OF CARE
Date/[id number]    Trauma/Ortho/Medical (Choose one) Ortho    Diagnosis: R hip hemiarthroplasty  POD#:2  Mental Status: A&O x4  Activity/dangle Assist of 1 with gait belt and walker  Diet:reg  Pain:prn dilaudid and schedule tylenol  Mc/Voiding: bathroom  Tele/Restraints/Iso:no  02/LDA: IV-SL  D/C Date: Pending TCU placement  Other Info: Aquacel dressing CDI. CMS intact.

## 2023-04-06 NOTE — PROGRESS NOTES
Orthopedic Surgery  Inga Coats  04/06/2023     Admit Date:  4/3/2023    POD: 2 Days Post-Op   Procedure(s):  RIGHT HIP HEMIARTHROPLASTY    Patient resting comfortably in chair. She is visiting with her daughter this morning.    Denies experiencing right hip pain currently, states that she does have some tenderness of the distal femur. Daughter states history of right knee OA. She denies calf pain at this time.   No muscle spasms.  Tolerating oral intake.    Denies nausea or vomiting.  Denies chest pain or shortness of breath.  No acute events overnight.    Temp:  [98.4  F (36.9  C)-99.1  F (37.3  C)] 98.4  F (36.9  C)  Pulse:  [64-74] 64  Resp:  [16-22] 16  BP: (100-138)/(47-58) 134/54  SpO2:  [93 %-95 %] 93 %    Alert and oriented.   Right hip dressing is clean, dry, and intact.   No erythema of the surrounding skin.   Bilateral calves are soft, non-tender.  Right lower extremity is NVI.  Sensation intact bilateral lower extremities.  Patient able to resist dorsi and plantar flexion bilaterally.  DP pulse palpable.    Labs:  Recent Labs   Lab Test 04/06/23  0725 04/05/23  0756 04/04/23  0632 04/03/23  1131 01/09/23  0551   WBC  --   --  7.0 11.2* 5.8   HGB 8.4* 8.5* 10.0* 11.9 12.9   PLT  --   --  224 304 333     Recent Labs   Lab Test 12/30/22  1103   INR 0.89     A/P    1. S/p right hemiarthroplasty for a femoral neck fracture   Reviewed typical postoperative plan, pain level, and general expectations with the patient. All questions answered.   Continue ASA 81 mg BID for DVT prophylaxis. Ok to resume PTA Plavix.   Mobilize with PT/OT.   WBAT RLE with walker.    Posterior hip precautions. Abduction pillow to remain in place when the patient sleeps to avoid adduction past midline.    Continue current pain regimen.   Dressings: Keep intact. Change if >50% saturated or peeling off.    Follow-up: 2 weeks post-op with Dr. Barraza/Kan Harley PA-C    2. Disposition   Anticipate d/c likely to TCU when medically  cleared and progressing in PT.    Edwina Paige PA-C  [facility]

## 2023-04-06 NOTE — PROGRESS NOTES
[facility]    Medicine Progress Note - Hospitalist Service    Date of Admission:  4/3/2023    Assessment & Plan   Inga Coats is a 83 year old female who presented to the ER with her daughter for syncope at home. Within the ED, she was found to have acute mildly communited fracture of the R femoral neck with anterior angulation and will be admitted for further work up of her syncope and ortho consultation     Syncope  Scalp laceration  Patient lives alone and reports she was walking out of her kitchen when the next thing she remembers she was getting up from the ground. She can't described any further details around the fall. It sounds like she went to bed normally however when she woke up she noticed her bathroom was full of blood.  She doesn't have any memory of the bleeding after her initial fall. After she fell again into her closet but this time she remembers the fall. She denies palpitations or feeling dizzy. She doesn't believe she had LOC but she is poor historian. She does remember R hip pain. Within the ED, EKG reviewed and normal. Troponin negative. Exam notable for head laceration. CT head negative for acute bleed.      - discontinue tele. No events reported. Suspect fall related to baseline ambulation difficulties post CVA  - remove scalp sutures in 7-10 days     R femoral neck Fracture  S/p Right hip hemiarthroplasty 4/4/23  Patient reports her R leg was painful before her initial fall.  - consult to ortho  - post op anticoagulation per ortho  - PRN pain medications  - PT/OT     Hx of SVT  Notable episodes on heart monitor 01/03/2022  - follow on tele here     CAD s/p CABG x3  Hx of Aortic stenosis s/p AVR  Patient denies any chest pain or cardiac symptoms prior to her fall. No signs of heart failure. Troponin negative and EKG reviewed and reassuring  * TTE with normal AVR and EF, see report  - continue PTA asa     CVA  Acute CVA to her L corona radiata 2 months  ago  - residual R sided weakness  - continue PTA ASA  - discontinue plavix, completed planned 90 days of post CVA therapy  - resume statin     CKD  - stable at baseline     Hyponatremia  Hypercalcemia  - will give small fluid bolus  - monitor AM labs     HTN  - resume PTA amlodipine     HLD  - resume PTA statin          Diet: Advance Diet as Tolerated: Regular Diet Adult    DVT Prophylaxis: Pneumatic Compression Devices  Mc Catheter: Not present  Lines: None     Cardiac Monitoring: None  Code Status: Full Code      Clinically Significant Risk Factors                                Disposition Plan     Expected Discharge Date: 04/06/2023,  3:00 PM    Destination: inpatient rehabilitation facility            Tony Mohan MD  Hospitalist Service  [facility]  Securely message with [employer] (more info)  Text page via [employer] Paging/Directory   ______________________________________________________________________    Interval History   Seen in AM. She feels tired.  She has no complaints. She is eager to rehab so she can go home.     Physical Exam   Vital Signs: Temp: 98.4  F (36.9  C) Temp src: Oral BP: 134/54 Pulse: 64   Resp: 14 SpO2: 93 % O2 Device: None (Room air)    Weight: 135 lbs 0 oz    Constitutional: Awake, alert, cooperative, no apparent distress  Respiratory: Clear to auscultation bilaterally, no crackles or wheezing  Cardiovascular: Regular rate and rhythm, crisp click, and no murmur noted  GI: Normal bowel sounds, soft, non-distended, non-tender  Skin/Integumen: No rashes, no cyanosis, no edema  Other: Right hip dressed.       Medical Decision Making       35 MINUTES SPENT BY ME on the date of service doing chart review, history, exam, documentation & further activities per the note.      Data     I have personally reviewed the following data over the past 24 hrs:    N/A  \   8.4 (L)   / N/A     N/A N/A N/A /  100 (H)   3.6 N/A N/A \       Imaging results reviewed over the past  24 hrs:   No results found for this or any previous visit (from the past 24 hour(s)).

## 2023-04-06 NOTE — PROGRESS NOTES
Pt is A&Ox4. Up with assist of 1 GB and walker. CMS intact. VSS on RA. Regular diet, IV SL. Mc in place and patent. Tele NSR. Right hip Aquacel dressing CDI. Schedule Tylenol given for pain management.

## 2023-04-07 ENCOUNTER — APPOINTMENT (OUTPATIENT)
Dept: PHYSICAL THERAPY | Facility: CLINIC | Age: 83
DRG: 522 | End: 2023-04-07
Payer: COMMERCIAL

## 2023-04-07 VITALS
BODY MASS INDEX: 22.81 KG/M2 | OXYGEN SATURATION: 95 % | WEIGHT: 135 LBS | SYSTOLIC BLOOD PRESSURE: 135 MMHG | HEART RATE: 72 BPM | DIASTOLIC BLOOD PRESSURE: 58 MMHG | RESPIRATION RATE: 18 BRPM | TEMPERATURE: 98.6 F

## 2023-04-07 PROCEDURE — 250N000013 HC RX MED GY IP 250 OP 250 PS 637: Performed by: PHYSICIAN ASSISTANT

## 2023-04-07 PROCEDURE — 97530 THERAPEUTIC ACTIVITIES: CPT | Mod: GP | Performed by: PHYSICAL THERAPIST

## 2023-04-07 PROCEDURE — 99239 HOSP IP/OBS DSCHRG MGMT >30: CPT | Performed by: STUDENT IN AN ORGANIZED HEALTH CARE EDUCATION/TRAINING PROGRAM

## 2023-04-07 PROCEDURE — 97116 GAIT TRAINING THERAPY: CPT | Mod: GP | Performed by: PHYSICAL THERAPIST

## 2023-04-07 PROCEDURE — 250N000013 HC RX MED GY IP 250 OP 250 PS 637: Performed by: STUDENT IN AN ORGANIZED HEALTH CARE EDUCATION/TRAINING PROGRAM

## 2023-04-07 RX ORDER — AMLODIPINE BESYLATE 10 MG/1
5 TABLET ORAL DAILY
DISCHARGE
Start: 2023-04-07

## 2023-04-07 RX ADMIN — ACETAMINOPHEN 975 MG: 325 TABLET, FILM COATED ORAL at 12:25

## 2023-04-07 RX ADMIN — ASPIRIN 81 MG: 81 TABLET, COATED ORAL at 08:36

## 2023-04-07 RX ADMIN — SENNOSIDES AND DOCUSATE SODIUM 1 TABLET: 50; 8.6 TABLET ORAL at 08:36

## 2023-04-07 RX ADMIN — AMLODIPINE BESYLATE 5 MG: 5 TABLET ORAL at 08:36

## 2023-04-07 RX ADMIN — Medication 50 MCG: at 08:36

## 2023-04-07 RX ADMIN — ACETAMINOPHEN 975 MG: 325 TABLET, FILM COATED ORAL at 04:19

## 2023-04-07 RX ADMIN — POLYETHYLENE GLYCOL 3350 17 G: 17 POWDER, FOR SOLUTION ORAL at 08:35

## 2023-04-07 ASSESSMENT — ACTIVITIES OF DAILY LIVING (ADL)
ADLS_ACUITY_SCORE: 36

## 2023-04-07 NOTE — PROGRESS NOTES
Care Management Follow Up    Length of Stay (days): 3    Expected Discharge Date: 04/07/2023     Concerns to be Addressed:     Discharge planning  Patient plan of care discussed at interdisciplinary rounds: Yes    Anticipated Discharge Disposition:  TCU placement     Anticipated Discharge Services:  therapy  Anticipated Discharge DME:  N/A    Patient/family educated on Medicare website which has current facility and service quality ratings:  no  Education Provided on the Discharge Plan:  yes  Patient/Family in Agreement with the Plan:  yes    Referrals Placed by CM/SW:  TCU referrals  Private pay costs discussed: transportation costs    Additional Information:  Received a call back from [facility].  They have a bed available for patient tomorrow.  They have received pre-auth from patient's insurance.  Call placed to update patient's daughter Prachi.  She is asking for transport to be arranged.  Explained that this will be private pay and patient's daughter is in agreement.  Call placed to [facility] to arrange for wheelchair transport for between 12:45-13:15 tomorrow.  Call placed and message left to update [facility] as to the above information.  Call placed to update patient's daughter Prachi as to the above information.    Will continue to follow.      PEDRO Hooks, [facility]    [phone]  [facility]

## 2023-04-07 NOTE — DISCHARGE SUMMARY
Patient is A&O x4. VSS on RA. Up with SBA of 1 with g/w. Denied pain. Dressing CDI. IV access removed. Discharging to [facility] via wheel chair.

## 2023-04-07 NOTE — PROGRESS NOTES
Orthopedic Surgery  Inga Coats  04/07/2023     Admit Date:  4/3/2023    POD: 3 Days Post-Op   Procedure(s):  RIGHT HIP HEMIARTHROPLASTY    Patient resting comfortably in chair.   Reports significant improvement in pain today. Has been active with PT. No concerns at this time.   Tolerating oral intake.    Denies nausea or vomiting.  Denies chest pain or shortness of breath.  No acute events overnight.    Temp:  [97.8  F (36.6  C)-98.7  F (37.1  C)] 98.6  F (37  C)  Pulse:  [72-75] 72  Resp:  [16-18] 18  BP: (126-142)/(58-78) 135/58  SpO2:  [95 %-97 %] 95 %    Alert and oriented.   Right hip dressing is clean, dry, and intact.   No erythema of the surrounding skin.   Bilateral calves are soft, non-tender.  Right lower extremity is NVI.  Sensation intact bilateral lower extremities.  Patient able to resist dorsi and plantar flexion bilaterally.  DP pulse palpable.    Labs:  Recent Labs   Lab Test 04/06/23  0725 04/05/23  0756 04/04/23  0632 04/03/23  1131 01/09/23  0551   WBC  --   --  7.0 11.2* 5.8   HGB 8.4* 8.5* 10.0* 11.9 12.9   PLT  --   --  224 304 333     Recent Labs   Lab Test 12/30/22  1103   INR 0.89     A/P    1. S/p right hemiarthroplasty for a femoral neck fracture   Continue ASA 81 mg BID for DVT prophylaxis. Ok to resume PTA Plavix.   Mobilize with PT/OT.   WBAT RLE with walker.    Posterior hip precautions. Abduction pillow to remain in place when the patient sleeps to avoid adduction past midline.    Continue current pain regimen.   Dressings: Keep intact. Change if >50% saturated or peeling off.    Follow-up: 2 weeks post-op with Dr. Barraza/Kan Harley PA-C    2. Disposition   Anticipate d/c likely to TCU when medically cleared and progressing in PT.    OK to discharge from an orthopedic standpoint. Ortho to sign off. Please contact orthopedic trauma team with any questions or concerns that arise.     Edwina Paige PA-C  [facility]

## 2023-04-07 NOTE — PROGRESS NOTES
Date/Time:4/7- 2300-0730     Trauma/Ortho/Medical (Choose one) Ortho     Diagnosis: R hip hemiarthroplasty  POD#:3  Mental Status: A&O x4  Activity/dangle Assist of 1 with gait belt and walker  Diet:reg  Pain schedule tylenol  Mc/Voiding: bathroom  Tele/Restraints/Iso:no  02/LDA: IV-SL  D/C Date: Pending TCU placement  Other Info: Aquacel dressing CDI. CMS intact.      Telephone Encounter by Jem SotoMark at 07/24/17 10:31 AM     Author:  RAVINDRA Kauffmna Service:  (none) Author Type:  Medical Assistant     Filed:  07/24/17 10:33 AM Encounter Date:  7/21/2017 Status:  Signed     :  Jem Soto, [address] (Certified Medical Assistant)            Fwd to [facility] pool  Please triage as requested[MA1.1M]      Revision History        User Key Date/Time User Provider Type Action    > MA1.1 07/24/17 10:33 AM Deangelo Levy RMA Certified Medical Assistant Sign    M - Manual

## 2023-04-07 NOTE — PROGRESS NOTES
Care Management Discharge Note    Discharge Date: 04/07/2023       Discharge Disposition:      Discharge Services:      Discharge DME:      Discharge Transportation: car, drives self    Private pay costs discussed: Not applicable    PAS Confirmation Code:    Patient/family educated on Medicare website which has current facility and service quality ratings:      Education Provided on the Discharge Plan:    Persons Notified of Discharge Plans: yes  Patient/Family in Agreement with the Plan:      Handoff Referral Completed: Yes    Additional Information:  SW called [facility] to confirm the dischage time for Patient which Is between [phone]. SW called patients daughter to update her as well of the discharge time. SW then send in discharge order over to [facility]. SW submitted PAS and faxed it over to [facility].   PAS-RR    D: Per DHS regulation, SW completed and submitted PAS-RR to [place] [employer] Direct Connect via the Senior [employer] Line.  PAS-RR confirmation # is : [id number]    I: SW spoke with patient and they are aware a PAS-RR has been submitted.  ALCON reviewed with patient that they may be contacted for a follow up appointment within 10 days of hospital discharge if their SNF stay is < 30 days.  Contact information for [employer] was also provided.    A: patient verbalized understanding.    P: Further questions may be directed to [employer] at #[phone], option #4 for PAS-RR staff.      ABIODUN Loza  [facility]  Social Work

## 2023-04-07 NOTE — OP NOTE
Procedure Date: 04/04/2023    PREOPERATIVE DIAGNOSIS:  Right displaced femoral neck fracture.    POSTOPERATIVE DIAGNOSIS:  Right displaced femoral neck fracture.    PROCEDURE:  Right hip hemiarthroplasty.    SURGEON:  Reji Barraza MD    ASSISTANT:  Kan Harley PA-C.    ANESTHESIA:  General.    ESTIMATED BLOOD LOSS:  100 mL.    INTRAOPERATIVE COMPLICATIONS:  None apparent.    OPERATIVE INDICATIONS:  The patient is an 83-year-old female who sustained a fall and was noted to have a displaced right femoral neck fracture.  Risks, benefits and alternatives to operative treatment with a right hip hemiarthroplasty were reviewed at length with the patient and she elected to proceed.    DESCRIPTION OF PROCEDURE:  The patient was identified in the preoperative holding area and the operative site was marked.  The consent was again reviewed and all questions were answered.  She was taken to the operating room and placed under general anesthesia, positioned in the left lateral decubitus position with all bony prominences well padded with the right lower extremity prepped and draped in the usual sterile fashion.  Preoperative antibiotics administered and a timeout called to ensure the correct operative site and procedure.  A longitudinal incision was made about the posterior aspect of the greater trochanter with sharp dissection carried down through the skin and subcutaneous tissues.  Further dissection was carried down through the iliotibial band and gluteal fascia.  An [place] retractor was placed.  The short external rotators and posterior capsule were taken down to the posterior base flap, leaving the piriformis intact.  The femoral head was excised out of the acetabulum. The proximal femur neck cut was freshened with a saw.  The box cut and canal finder were then used to enter the canal followed by sequential reaming up to a size 6, at which time there was good fit.  Broaching commenced up to a size 6.  The bone bed was  prepared and the Omnifit -degree neck angle hip stem size 6 was seated and cemented with all excess cement carefully removed matching the patient's native femoral version.  Multiple trial reductions were performed with the 42 mm head and the +0 offset neck adjustment sleeve along the size 42 Unitrax endoprosthesis.  Head component was felt to be most appropriate for stability and restoration of leg lengths.  This was placed on the hip and once again, there was excellent restoration of stability in the sleeper position.  The wound was then soaked in a diluted Betadine solution followed by irrigation with pulsatile lavage.  Pericapsular anesthetic cocktail was infiltrated throughout the joint.  A gram of vancomycin powder was placed deep in the wound.  The short external rotators and posterior capsule were repaired through drill holes in the greater trochanter.  The iliotibial band and gluteal fascia were closed with Vicryl suture followed by layered wound closure.  Sterile dressings were applied.  The patient was then awoken from general anesthesia and transferred to postanesthesia care unit in stable condition.    Reji Barraza MD        D: 2023   T: 2023   MT: sergio    Name:     AMARIS BAKER  MRN:      [id number]        Account:        [id number]   :      1940           Procedure Date: 2023     Document: [id number]

## 2023-04-07 NOTE — PROGRESS NOTES
Pt A&Ox4. VSS on Rm Air. C/o 3-5 out of 10 pain to R hip; scheduled tylenol, PRN PO dilaudid given w/ good effect. Ice packs also utilized. CMS intact. Asstx1 GB/walker. Up to BR. Wedge pillow while in bed. Drsg CDI to R hip. Tolerating regular diet. TCU placement when medically stable.

## 2023-04-07 NOTE — DISCHARGE SUMMARY
[facility]  Hospitalist Discharge Summary      Date of Admission:  4/3/2023  Date of Discharge:  4/7/2023  Discharging Provider: Tony Mohan MD  Discharge Service: Hospitalist Service    Discharge Diagnoses   As below    Follow-ups Needed After Discharge   Follow-up Appointments     Follow Up and recommended labs and tests      Follow-up with Dr. Barraza ([facility]) at 2 weeks postop for   recheck and wound evaluation (suture/staple removal). No need for follow   up labs or testing prior to this appointment.     Please contact Dr. Barraza's care coordinator, Nery, at [phone] to   schedule or for any questions related to your orthopedic injury/surgery.           Follow Up and recommended labs and tests      Follow up with Nursing home physician.  No follow up labs or test are   needed.           Unresulted Labs Ordered in the Past 30 Days of this Admission     No orders found from 3/4/2023 to 4/4/2023.      These results will be followed up by n/a    Discharge Disposition   Discharged to rehabilitation facility  Condition at discharge: Stable      Hospital Course   Inga Coats is a 83 year old female who presented to the ER with her daughter for syncope/fall at home. Within the ED, she was found to have acute mildly communited fracture of the R femoral neck with anterior angulation. She underwent right hip hemiarthroplasty on 4/4/23. She will discharge to TCU on 4/7/23 for further rehab.      Syncope  Scalp laceration  Patient lives alone and reports she was walking out of her kitchen when the next thing she remembers she was getting up from the ground. She can't described any further details around the fall. It sounds like she went to bed normally however when she woke up she noticed her bathroom was full of blood.  She doesn't have any memory of the bleeding after her initial fall. After she fell again into her closet but this time she remembers the fall. She  denies palpitations or feeling dizzy. She doesn't believe she had LOC but she is poor historian. She does remember R hip pain. Within the ED, EKG reviewed and normal. Troponin negative. Exam notable for head laceration. CT head negative for acute bleed.   * suspect that both falls mechanical in nature. She may have suffered mild concussion related to first fall. TTE w/o change from prior. Recent heart monitor demonstrated no arrhythmogenic concern for syncope. Tele here was without event by report. She does have residual deficits in ambulation related to recent CVA.      - remove scalp sutures in 10 days on 4/13/23     R femoral neck Fracture  S/p Right hip hemiarthroplasty 4/4/23  Patient reports her R leg was painful before her initial fall.  - discharge to TCU   - 81mg ASA BID for DVT ppx  - WBAT with walker     Hx of SVT  Notable episodes on heart monitor 01/03/2022     CAD s/p CABG x3  Hx of Aortic stenosis s/p AVR  Patient denies any chest pain or cardiac symptoms prior to her fall. No signs of heart failure. 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  - residual R sided weakness  - continue PTA ASA  - discontinue plavix, completed planned 90 days of post CVA therapy  - resume statin     CKD  - stable at baseline     Hyponatremia  Hypercalcemia  - will give small fluid bolus  - monitor AM labs     HTN  - resume PTA amlodipine     HLD  - resume PTA statin    Acute blood loss anemia     Consultations This Hospital Stay   ORTHOPEDIC SURGERY IP CONSULT  PHYSICAL THERAPY ADULT IP CONSULT  OCCUPATIONAL THERAPY ADULT IP CONSULT  PHYSICAL THERAPY ADULT IP CONSULT  OCCUPATIONAL THERAPY ADULT IP CONSULT  CARE MANAGEMENT / SOCIAL WORK IP CONSULT  PHYSICAL THERAPY ADULT IP CONSULT  OCCUPATIONAL THERAPY ADULT IP CONSULT    Code Status   Full Code    Time Spent on this Encounter   Tony LYNN  MD Marques, personally saw the patient today and spent greater than 30 minutes discharging this patient.       Tony Mohan MD  [facility]  [address] [place] [id number]  Phone: [phone]  Fax: [phone]  ______________________________________________________________________    Physical Exam   Vital Signs: Temp: 98.6  F (37  C) Temp src: Oral BP: 135/58 Pulse: 72   Resp: 18 SpO2: 95 % O2 Device: Nasal cannula    Weight: 135 lbs 0 oz  Constitutional: Awake, alert, cooperative, no apparent distress  Respiratory: Clear to auscultation bilaterally, no crackles or wheezing  Cardiovascular: Regular rate and rhythm, crisp click, and no murmur noted  GI: Normal bowel sounds, soft, non-distended, non-tender  Skin/Integumen: No rashes, no cyanosis, no edema  Other:  Right hip dressed.        Primary Care Physician   Joe Ramires    Discharge Orders      General info for SNF    Length of Stay Estimate: Short Term Care: Estimated # of Days <30  Condition at Discharge: Stable  Level of care:skilled   Rehabilitation Potential: Good  Admission H&P remains valid and up-to-date: Yes  Recent Chemotherapy: N/A  Use Nursing Home Standing Orders: Yes     Mantoux instructions    Give two-step Mantoux (PPD) Per Facility Policy Yes     Follow Up and recommended labs and tests    Follow-up with Dr. Barraza ([facility]) at 2 weeks postop for recheck and wound evaluation (suture/staple removal). No need for follow up labs or testing prior to this appointment.     Please contact Dr. Barraza's care coordinator, Nery, at [phone] to schedule or for any questions related to your orthopedic injury/surgery.     Reason for your hospital stay    S/p right hip hemiarthroplasty for a femoral neck fracture (DOS: 4/4/23)     Wound care    Site:   Right hip  Instructions:  Please leave dressing intact for 2 weeks postoperatively. Okay to change if saturated >50% or  peeling. Okay to shower. No soaking or submersion. Please contact your orthopedic surgical team if persistent drainage or redness develops around the surgical site.     Additional Discharge Instructions    Pain after surgery is normal and expected.  You will have some amount of pain for several weeks after surgery.  Your pain will improve with time.  There are several things you can do to help reduce your pain including: rest, ice, elevation, and using pain medications as needed. Contact your Surgeon Team if you have pain that persists or worsens after surgery despite rest, ice, elevation, and taking your medication(s) as prescribed. Contact your Surgeon Team if you have new numbness, tingling, or weakness in your operative extremity.    Swelling and/or bruising of the surgical extremity is common and may persist for several months after surgery. In addition to frequent icing and elevation, gentle compressive support with an ACE wrap or tubigrip may help with swelling. Apply compression regularly, removing at least twice daily to perform skin checks. Contact your Surgeon Team if your swelling increases and is NOT associated with an increase in your activity level, or if your swelling increases and is associated with redness and pain.    Ice can be used to control swelling and discomfort after surgery. Place a thin towel over your operative site and apply the ice pack overtop. Leave ice pack in place for 20 minutes, then remove for 20 minutes. Repeat this 20 minutes on/20 minutes off routine as often as tolerated.    Please contact your surgical team with any concerns for infection including increasing redness around your surgical site, pus-like drainage, fever, chills, or flu-like symptoms.     Activity - Up with assistive device     Activity - Up with nursing assistance     Weight bearing status    WBAT RLE with walker     General info for SNF    Length of Stay Estimate: Short Term Care: Estimated # of Days  <30  Condition at Discharge: Improving  Level of care:skilled   Rehabilitation Potential: Excellent  Admission H&P remains valid and up-to-date: Yes  Recent Chemotherapy: N/A  Use Nursing Home Standing Orders: Yes     Mantoux instructions    Give two-step Mantoux (PPD) Per Facility Policy Yes     Follow Up and recommended labs and tests    Follow up with Nursing home physician.  No follow up labs or test are needed.     Reason for your hospital stay    You were in the hospital due to hip fracture     Activity - Up with assistive device     Activity - Up with nursing assistance     Additional Discharge Instructions    Remove scalp sutures on 4/13/23     Physical Therapy Adult Consult    Evaluate and treat as clinically indicated.    Reason: S/p right hip hemiarthroplasty for a femoral neck fracture (DOS: 4/4/23)     Occupational Therapy Adult Consult    Evaluate and treat as clinically indicated.    Reason: S/p right hip hemiarthroplasty for a femoral neck fracture (DOS: 4/4/23)     Physical Therapy Adult Consult    Evaluate and treat as clinically indicated.    Reason:  hip fx     Occupational Therapy Adult Consult    Evaluate and treat as clinically indicated.    Reason:  hip fracture     Fall precautions     Hip precautions    Posterior hip precautions - No IR, no flexion >90 degrees, no adduction past midline     Fall precautions     Crutches DME    DME Documentation: Describe the reason for need to support medical necessity: Impaired gait status post hip surgery. I, the undersigned, certify that the above prescribed supplies are medically necessary for this patient and is both reasonable and necessary in reference to accepted standards of medical practice in the treatment of this patient's condition and is not prescribed as a convenience.     Cane DME    DME Documentation: Describe the reason for need to support medical necessity: Impaired gait status post hip surgery. I, the undersigned, certify that the above  prescribed supplies are medically necessary for this patient and is both reasonable and necessary in reference to accepted standards of medical practice in the treatment of this patient's condition and is not prescribed as a convenience.     Walker DME    : DME Documentation: Describe the reason for need to support medical necessity: Impaired gait status post hip surgery. I, the undersigned, certify that the above prescribed supplies are medically necessary for this patient and is both reasonable and necessary in reference to accepted standards of medical practice in the treatment of this patient's condition and is not prescribed as a convenience.     Diet    Follow this diet upon discharge: Orders Placed This Encounter      Advance Diet as Tolerated: Regular Diet Adult       Significant Results and Procedures   Most Recent 3 CBC's:  Recent Labs   Lab Test 04/06/23 0725 04/05/23 0756 04/04/23  0632 04/03/23 1131 01/09/23  0551   WBC  --   --  7.0 11.2* 5.8   HGB 8.4* 8.5* 10.0* 11.9 12.9   MCV  --   --  85 83 87   PLT  --   --  224 304 333     Most Recent 3 BMP's:  Recent Labs   Lab Test 04/06/23 0725 04/05/23 0756 04/04/23  0632 04/04/23  0221 04/03/23 1131 01/09/23  0551   0000   NA  --   --  135*  --  132* 137  --    POTASSIUM 3.6 3.8 4.4  --  5.2 4.2   < >   CHLORIDE  --   --  104  --  97* 106  --    CO2  --   --  23 -- 23 28  --    BUN  --   --  16.3  --  26.5* 15  --    CR  --   --  0.76  --  0.75 0.80  --    ANIONGAP  --   --  8  --  12 3  --    JAC  --   --  8.7*  --  10.4* 9.5  --    * 89 115*   < > 171* 99   < >    < > = values in this interval not displayed.   ,   Results for orders placed or performed during the hospital encounter of 04/03/23   CT Head w/o Contrast    Narrative    CT HEAD W/O CONTRAST 4/3/2023 12:30 PM    INDICATION: closed head injury. Head trauma.  TECHNIQUE: CT scan of the head without contrast. Dose reduction  techniques were used.  CONTRAST: None.  COMPARISON:  12/31/2022 head CT.    FINDINGS:   No intracranial hemorrhage, extraaxial collection, mass effect or CT  evidence of acute infarct.  Chronic lacunar infarct in the  periventricular left corona radiata, the left lentiform nucleus, and  left caudate head. Moderate burden of scattered chronic small vessel  ischemic change. Moderate generalized volume loss. The ventricles are  proportional to the sulci. No skull fracture. No scalp hematoma.  Unremarkable orbits. Paranasal sinuses are free of significant  disease. Clear mastoid air cells.      Impression    IMPRESSION:  Age-related changes and chronic ischemic changes as above with no  acute intracranial abnormality.    JAYDEN PETERSON MD         SYSTEM ID:  [id number]   CT Pelvis Bone wo Contrast    Narrative    CT PELVIS BONE WO CONTRAST 4/3/2023 12:30 PM    INDICATION: right hip/pelvis pain after trauma  COMPARISON: None.    TECHNIQUE: Noncontrast. Axial, sagittal and coronal thin-section  reconstruction. Dose reduction techniques were used.   CONTRAST: None.    FINDINGS:   There is a mildly comminuted and impacted fracture through the right  femoral neck without evidence for intertrochanteric extension. No  evidence for dislocation. There is apex anterior angulation at the  fracture, however. Superimposed degenerative change of the right hip  joint.    The left hip is negative for fracture or CT evidence of avascular  necrosis. The remainder the pelvis is negative for fracture.    No significant hematoma or fluid collection around the fracture. The  intrapelvic contents are negative reason      Impression    IMPRESSION:  1.  Mildly comminuted fracture of the right femoral neck with apex  anterior angulation. No evidence for intertrochanteric extension or  dislocation of the hip joint.  2.  Degenerative change of the right hip joint.  3.   Left hip negative for fracture.  4.  Pelvis negative for fracture.  5.  No significant hematoma or effusion.      BORIS LAMB MD          SYSTEM ID:  [id number]   XR Pelvis w Hip Right 1 View    Narrative    EXAM: XR PELVIS AND HIP RIGHT 1 VIEW  LOCATION: [facility]  DATE/TIME: 4/3/2023 8:42 PM    INDICATION: FEMORAL NECK FRACTURE  COMPARISON: None.      Impression    IMPRESSION: Normal hip joint spaces and alignment. Acute displaced fracture of the right femoral neck near the subcapital region with superolateral displacement of the distal fragment by more than one half bone width and varus angulation.    Degenerative changes in lumbar spine.   XR Pelvis Port 1/2 Views    Narrative    EXAM: XR PELVIS PORT 1/2 VIEWS  LOCATION: [facility]  DATE/TIME: 2023 6:11 PM    INDICATION: Status post Hip surgery  COMPARISON: None.      Impression    IMPRESSION: Postop changes of a recent right femoral hemiarthroplasty. No fracture.   Echocardiogram Limited     Value    LVEF  65-70%    Narrative    [id number]  [id number]  [id number]  [id number]^JAKE^BRISSA^RADHA     [facility]  Echocardiography Laboratory  [address] [place] [address]     Name: AMARIS BAKER  MRN: [id number]  : 1940  Study Date: 2023 10:03 AM  Age: 83 yrs  Gender: Female  Patient Location: [facility]  Reason For Study: Syncope  Ordering Physician: BRISSA JOSEPH  Referring Physician: Joe Ramires  Performed By: Jenni Barragan     BSA: 1.7 m2  Height: 64 in  Weight: 139 lb  HR: 70  BP: 135/57 mmHg  ______________________________________________________________________________  Procedure  Limited Portable Echo Adult. Optison (NDC #3220-2355) given intravenously.  ______________________________________________________________________________  Interpretation Summary     Left ventricular systolic function is normal.  The visual ejection fraction is 65-70%.  No regional wall motion abnormalities noted.  The right ventricle is normal in size and function.  There is a bioprosthetic aortic  valve.  Normal mean systolic gradient of 8 mmHg. Trace prosthetic valve regurgitation.  Mild mitral and tricuspid valve regurgitation.  Normal inferior vena cava.     No changes compared to study dated 12/31/2022.  ______________________________________________________________________________  Left Ventricle  The left ventricle is normal in size. There is normal left ventricular wall  thickness. Left ventricular systolic function is normal. The visual ejection  fraction is 65-70%. No regional wall motion abnormalities noted.     Right Ventricle  The right ventricle is normal in size and function.     Atria  Normal left atrial size. Right atrial size is normal. There is no color  Doppler evidence of an atrial shunt.     Mitral Valve  The mitral valve leaflets are mildly thickened. There is mild (1+) mitral  regurgitation. There is no mitral valve stenosis.     Tricuspid Valve  Normal tricuspid valve. There is mild (1+) tricuspid regurgitation. The right  ventricular systolic pressure is approximated at 39.9 mmHg plus the right  atrial pressure.     Aortic Valve  The mean AoV pressure gradient is 8.7 mmHg. There is a bioprosthetic aortic  valve. The prosthetic aortic valve is well-seated. The gradient is normal for  this prosthetic aortic valve.     Pulmonic Valve  Normal pulmonic valve. There is trace pulmonic valvular regurgitation. Normal  pulmonic valve velocity.     Vessels  The aortic root is normal size. Normal size ascending aorta. The inferior vena  cava was normal in size with preserved respiratory variability.     Pericardium  There is no pericardial effusion.     Rhythm  Sinus rhythm was noted.  ______________________________________________________________________________  MMode/2D Measurements & Calculations  LVOT diam: 2.1 cm  LVOT area: 3.4 cm2     Doppler Measurements & Calculations  MV E max koki: 83.6 cm/sec  MV A max koki: 90.6 cm/sec  MV E/A: 0.92  MV dec slope: 339.4 cm/sec2  MV dec time: 0.25  sec  Ao V2 max: 218.9 cm/sec  Ao max P.2 mmHg  Ao V2 mean: 137.6 cm/sec  Ao mean P.7 mmHg  Ao V2 VTI: 43.1 cm  IMANI(I,D): 1.6 cm2  IMANI(V,D): 1.5 cm2  Ao acc time: 0.10 sec  LV V1 max PG: 3.8 mmHg  LV V1 max: 97.5 cm/sec  LV V1 VTI: 20.8 cm  SV(LVOT): 70.9 ml  SI(LVOT): 42.3 ml/m2  TR max renny: 316.0 cm/sec  TR max P.9 mmHg  AV Renny Ratio (DI): 0.45  IMANI Index (cm2/m2): 0.98     ______________________________________________________________________________  Report approved by: Dr Lilliam Hendrix 2023 12:40 PM               Discharge Medications   Current Discharge Medication List      START taking these medications    Details   aspirin (ASA) 81 MG EC tablet Take 1 tablet (81 mg) by mouth 2 times daily for 42 days  Qty: 84 tablet, Refills: 0    Associated Diagnoses: Closed fracture of neck of right femur, initial encounter (H)      hydrOXYzine (ATARAX) 10 MG tablet Take 1 tablet (10 mg) by mouth every 6 hours as needed for other (adjuvant pain)  Qty: 20 tablet, Refills: 0    Associated Diagnoses: Closed fracture of neck of right femur, initial encounter (H)      ondansetron (ZOFRAN ODT) 4 MG ODT tab Take 1 tablet (4 mg) by mouth every 6 hours as needed for nausea or vomiting  Qty: 5 tablet, Refills: 0    Associated Diagnoses: Closed fracture of neck of right femur, initial encounter (H)      oxyCODONE (ROXICODONE) 5 MG tablet Take 1 tablet (5 mg) by mouth every 4 hours as needed for moderate pain  Qty: 20 tablet, Refills: 0    Associated Diagnoses: Closed fracture of neck of right femur, initial encounter (H)      polyethylene glycol (MIRALAX) 17 GM/Dose powder Take 17 g by mouth daily  Qty: 510 g, Refills: 0    Associated Diagnoses: Closed fracture of neck of right femur, initial encounter (H)      senna-docusate (SENOKOT-S/PERICOLACE) 8.6-50 MG tablet Take 1 tablet by mouth 2 times daily  Qty: 21 tablet, Refills: 0    Associated Diagnoses: Closed fracture of neck of right femur, initial encounter  (H)         CONTINUE these medications which have CHANGED    Details   acetaminophen (TYLENOL) 325 MG tablet Take 2 tablets (650 mg) by mouth every 6 hours as needed for other (For optimal non-opioid multimodal pain management to improve pain control.)  Qty: 100 tablet, Refills: 0    Associated Diagnoses: Closed fracture of neck of right femur, initial encounter (H)      amLODIPine (NORVASC) 10 MG tablet Take 0.5 tablets (5 mg) by mouth daily    Associated Diagnoses: Cerebrovascular accident (CVA), unspecified mechanism (H)         CONTINUE these medications which have NOT CHANGED    Details   Apoaequorin (PREVAGEN) 10 MG CAPS Take 1 capsule by mouth daily      atorvastatin (LIPITOR) 80 MG tablet Take 1 tablet (80 mg) by mouth every evening  Qty: 30 tablet, Refills: 0    Associated Diagnoses: Cerebrovascular accident (CVA), unspecified mechanism (H)      Misc Natural Products (OSTEO BI-FLEX ADV TRIPLE ST PO) Take 2 capsules by mouth daily      multivitamin  with lutein (OCUVITE WITH LUTEIN) CAPS per capsule Take 1 capsule by mouth daily      multivitamin (CENTRUM SILVER) tablet Take 1 tablet by mouth daily      vitamin D3 (CHOLECALCIFEROL) 50 mcg (2000 units) tablet Take 1 tablet by mouth daily         STOP taking these medications       aspirin (ASA) 81 MG chewable tablet Comments:   Reason for Stopping:         clopidogrel (PLAVIX) 75 MG tablet Comments:   Reason for Stopping:             Allergies   Allergies   Allergen Reactions     Shellfish-Derived Products Anaphylaxis     Fish-Derived Products

## 2023-04-07 NOTE — CARE PLAN
Physical Therapy Discharge Summary    Reason for therapy discharge:    Discharged to transitional care facility.    Progress towards therapy goal(s). See goals on Care Plan in [facility] electronic health record for goal details.  Goals partially met.  Barriers to achieving goals:   discharge from facility.    Therapy recommendation(s):    Continued therapy is recommended.  Rationale/Recommendations:  to maximize functional mob I, javid and safety to return to PLOF.

## 2023-04-08 ENCOUNTER — PATIENT OUTREACH (OUTPATIENT)
Dept: CARE COORDINATION | Facility: CLINIC | Age: 83
End: 2023-04-08
Payer: COMMERCIAL

## 2023-04-08 NOTE — PROGRESS NOTES
[facility]    Background: Transitional Care Management program identified per system criteria and reviewed by [facility] team for possible outreach.    Assessment: Upon chart review, [facility] Team member will not proceed with patient outreach related to this episode of Transitional Care Management program due to reason below:    Patient has discharged to a Memory Care, Long-term Care, Assisted Living or Group Home where patient is receiving on-site support with their daily cares, including support with hospital follow up plan.     Discharged to rehabilitation facility    Plan: Transitional Care Management episode addressed appropriately per reason noted above.      CESAR Fragoso  Connected [facility] Center, [facility]    *Connected Care Resource Team does NOT follow patient ongoing. Referrals are identified based on internal discharge reports and the outreach is to ensure patient has an understanding of their discharge instructions.

## 2023-04-10 ENCOUNTER — TRANSITIONAL CARE UNIT VISIT (OUTPATIENT)
Dept: GERIATRICS | Facility: CLINIC | Age: 83
End: 2023-04-10
Payer: COMMERCIAL

## 2023-04-10 ENCOUNTER — LAB REQUISITION (OUTPATIENT)
Dept: LAB | Facility: CLINIC | Age: 83
End: 2023-04-10
Payer: COMMERCIAL

## 2023-04-10 VITALS
BODY MASS INDEX: 23.73 KG/M2 | SYSTOLIC BLOOD PRESSURE: 118 MMHG | HEIGHT: 64 IN | TEMPERATURE: 97.3 F | WEIGHT: 139 LBS | OXYGEN SATURATION: 96 % | DIASTOLIC BLOOD PRESSURE: 51 MMHG | RESPIRATION RATE: 18 BRPM | HEART RATE: 89 BPM

## 2023-04-10 DIAGNOSIS — S09.90XA CLOSED HEAD INJURY, INITIAL ENCOUNTER: ICD-10-CM

## 2023-04-10 DIAGNOSIS — I48.91 UNSPECIFIED ATRIAL FIBRILLATION (H): ICD-10-CM

## 2023-04-10 DIAGNOSIS — S72.001A CLOSED FRACTURE OF NECK OF RIGHT FEMUR, INITIAL ENCOUNTER (H): Primary | ICD-10-CM

## 2023-04-10 DIAGNOSIS — I63.9 CEREBROVASCULAR ACCIDENT (CVA), UNSPECIFIED MECHANISM (H): ICD-10-CM

## 2023-04-10 DIAGNOSIS — R52 PAIN MANAGEMENT: ICD-10-CM

## 2023-04-10 DIAGNOSIS — R53.81 PHYSICAL DECONDITIONING: ICD-10-CM

## 2023-04-10 PROBLEM — I10 ESSENTIAL HYPERTENSION: Status: ACTIVE | Noted: 2023-02-14

## 2023-04-10 PROCEDURE — 99309 SBSQ NF CARE MODERATE MDM 30: CPT | Performed by: NURSE PRACTITIONER

## 2023-04-10 NOTE — PROGRESS NOTES
[facility] GERIATRIC SERVICES       Patient Inga Coats  MRN: [id number]        Reason for Visit     Chief Complaint   Patient presents with     RECHECK     INITIAL       Code Status     CPR/Full code     Assessment     History of a right femoral neck fracture with angulation status post right hip hemiarthroplasty for 4 / 4 /23  Syncope with fall  abla  Scalp laceration  Recent h/o of CVA  CAD  CKD 3a  History of hyponatremia  History of hypercalcemia  HTN  Generalized weakness    Plan     Pt is admitted to TCU for strengthening and rehab.  Pt admitted to TCU after undergoing right hip hemiarthroplasty /hip surgery  Date of procedure-4/4/2023  Continue with incisional cares  WBAT  Follow-up with orthopedics as scheduled  Cont PT / OT for strengthening/ gait retraining  Pain management optimized  Tylenol scheduled 1 g every 8 hours  Continue narcotics prn-  Advised aggressive icing  On asa 81mg for dvt prophylaxis  Duration to be determined by orthopedics  Tubigrip stockings recommended for management of swelling of the lower extremities     However patient is concerned about increased swelling in her right lower extremity which has never happened before as per her  Doppler ultrasound of the right lower extremity ordered to rule out a DVT  Advised compression stockings if negative  Also recently had a CVA and resultant right-sided weakness  She reports she was back to normal and even independent with driving and using a cane just for support  Recheck labs reviewed in the chart today.  Hemoglobin today is on recheck 8.8 which is improved  Electrolytes and kidney functions are normal  Continue with PT/OT-at baseline has right-sided weakness  Scalp laceration is healing    History     Patient is a very pleasant 83 year old female who is admitted to TCU  Patient was admitted to the hospital for syncope resulting in a fall and a scalp laceration  She also had a right hip fracture and underwent right hip  hemiarthroplasty on 4/4/2023.  Discharge to the TCU for rehab    Past Medical & Surgical History     PAST MEDICAL HISTORY:   Past Medical History:   Diagnosis Date     Chronic kidney disease      CVA (cerebral vascular accident) (H)      Hypertension      S/P AVR      S/P CABG (coronary artery bypass graft)     x3     SVT (supraventricular tachycardia) (H)       PAST SURGICAL HISTORY:   has a past surgical history that includes Cardiac surgery and Open reduction internal fixation hip bipolar (Right, 4/4/2023).      Past Social History     Reviewed,  reports that she has never smoked. She has never used smokeless tobacco. She reports that she does not currently use alcohol. She reports that she does not use drugs.    Family History     Reviewed, and family history is not on file.    Medication List     Current Outpatient Medications   Medication     acetaminophen (TYLENOL) 325 MG tablet     amLODIPine (NORVASC) 10 MG tablet     Apoaequorin (PREVAGEN) 10 MG CAPS     aspirin (ASA) 81 MG EC tablet     atorvastatin (LIPITOR) 80 MG tablet     hydrOXYzine (ATARAX) 10 MG tablet     Misc Natural Products (OSTEO BI-FLEX ADV TRIPLE ST PO)     multivitamin (CENTRUM SILVER) tablet     ondansetron (ZOFRAN ODT) 4 MG ODT tab     oxyCODONE (ROXICODONE) 5 MG tablet     polyethylene glycol (MIRALAX) 17 GM/Dose powder     senna-docusate (SENOKOT-S/PERICOLACE) 8.6-50 MG tablet     vitamin D3 (CHOLECALCIFEROL) 50 mcg (2000 units) tablet     No current facility-administered medications for this visit.      MED REC REQUIRED  Post Medication Reconciliation Status:   medications reconciled ;cont as above         Allergies     Allergies   Allergen Reactions     Shellfish Allergy Anaphylaxis     Shellfish-Derived Products Anaphylaxis     Fish-Derived Products        Review of Systems   A comprehensive review of 14 systems was done. Pertinent findings noted here and in history of present illness. All the rest negative.  Constitutional:  "Negative.  Negative for fever, chills, she has  activity change, appetite change and fatigue.   HENT: Negative for congestion and facial swelling.    Eyes: Negative for photophobia, redness and visual disturbance.   Respiratory: Negative for cough and chest tightness.    Cardiovascular: Negative for chest pain, palpitations and leg swelling.   Gastrointestinal: Negative for nausea, diarrhea, constipation, blood in stool and abdominal distention.   Genitourinary: Negative.    Musculoskeletal: Reports right-sided weakness from the stroke which she had recently  Pain in the hip is controlled.    Skin: Negative.    Neurological: Negative for dizziness, tremors, syncope, weakness, light-headedness and headaches.   Hematological: Does not bruise/bleed easily.   Psychiatric/Behavioral: Negative.        Physical Exam   /64   Pulse 83   Temp 97.4  F (36.3  C)   Resp 16   Ht 1.626 m (5' 4\")   Wt 60.3 kg (133 lb)   SpO2 92%   BMI 22.83 kg/m       Constitutional: Oriented to person, place, and time and appears well-developed.   HEENT:  Normocephalic and atraumatic.  Eyes: Conjunctivae and EOM are normal. Pupils are equal, round, and reactive to light. No discharge.  No scleral icterus. Nose normal. Mouth/Throat: Oropharynx is clear and moist. No oropharyngeal exudate.    NECK: Normal range of motion. Neck supple. No JVD present. No tracheal deviation present. No thyromegaly present.   CARDIOVASCULAR: Normal rate, regular rhythm and intact distal pulses.  Exam reveals no gallop and no friction rub.  Systolic murmur present.  PULMONARY: Effort normal and breath sounds normal. No respiratory distress.No Wheezing or rales.  ABDOMEN: Soft. Bowel sounds are normal. No distension and no mass.  There is no tenderness. There is no rebound and no guarding. No HSM.  MUSCULOSKELETAL: Normal range of motion. Mild kyphosis, no tenderness.  Right hip incision intact with a Mepilex dressing  LYMPH NODES: Has no cervical, " supraclavicular, axillary and groin adenopathy.   NEUROLOGICAL: Alert and oriented to person, place, and time. No cranial nerve deficit.  Normal muscle tone. Coordination normal.   Rt sided weakness from CVA  GENITOURINARY: Deferred exam.  SKIN: Skin is warm and dry. No rash noted. No erythema. No pallor.   Scalp laceration intact with staples noted on rt parietal scalp  EXTREMITIES: No cyanosis, no clubbing,  right greater than left lower extremity  edema. No Deformity.  PSYCHIATRIC: Normal mood, affect and behavior.      Lab Results     Recent Results (from the past 240 hour(s))   Basic metabolic panel    Collection Time: 04/03/23 11:31 AM   Result Value Ref Range    Sodium 132 (L) 136 - 145 mmol/L    Potassium 5.2 3.4 - 5.3 mmol/L    Chloride 97 (L) 98 - 107 mmol/L    Carbon Dioxide (CO2) 23 22 - 29 mmol/L    Anion Gap 12 7 - 15 mmol/L    Urea Nitrogen 26.5 (H) 8.0 - 23.0 mg/dL    Creatinine 0.75 0.51 - 0.95 mg/dL    Calcium 10.4 (H) 8.8 - 10.2 mg/dL    Glucose 171 (H) 70 - 99 mg/dL    GFR Estimate 78 >60 mL/min/1.73m2   Troponin T, High Sensitivity    Collection Time: 04/03/23 11:31 AM   Result Value Ref Range    Troponin T, High Sensitivity 11 <=14 ng/L   CBC with platelets and differential    Collection Time: 04/03/23 11:31 AM   Result Value Ref Range    WBC Count 11.2 (H) 4.0 - 11.0 10e3/uL    RBC Count 4.24 3.80 - 5.20 10e6/uL    Hemoglobin 11.9 11.7 - 15.7 g/dL    Hematocrit 35.2 35.0 - 47.0 %    MCV 83 78 - 100 fL    MCH 28.1 26.5 - 33.0 pg    MCHC 33.8 31.5 - 36.5 g/dL    RDW 13.8 10.0 - 15.0 %    Platelet Count 304 150 - 450 10e3/uL    % Neutrophils 89 %    % Lymphocytes 6 %    % Monocytes 5 %    % Eosinophils 0 %    % Basophils 0 %    % Immature Granulocytes 0 %    NRBCs per 100 WBC 0 <1 /100    Absolute Neutrophils 10.0 (H) 1.6 - 8.3 10e3/uL    Absolute Lymphocytes 0.7 (L) 0.8 - 5.3 10e3/uL    Absolute Monocytes 0.6 0.0 - 1.3 10e3/uL    Absolute Eosinophils 0.0 0.0 - 0.7 10e3/uL    Absolute Basophils  0.0 0.0 - 0.2 10e3/uL    Absolute Immature Granulocytes 0.0 <=0.4 10e3/uL    Absolute NRBCs 0.0 10e3/uL   Extra Blue Top Tube    Collection Time: 04/03/23 11:32 AM   Result Value Ref Range    Hold Specimen JIC    EKG 12-lead, tracing only    Collection Time: 04/03/23  1:01 PM   Result Value Ref Range    Systolic Blood Pressure  mmHg    Diastolic Blood Pressure  mmHg    Ventricular Rate 79 BPM    Atrial Rate 79 BPM    NV Interval 186 ms    QRS Duration 74 ms     ms    QTc 433 ms    P Axis 63 degrees    R AXIS 22 degrees    T Axis 112 degrees    Interpretation ECG       Sinus rhythm  Possible Left atrial enlargement  Cannot rule out Anterior infarct , age undetermined  Abnormal ECG  When compared with ECG of 30-DEC-2022 11:22,  Inverted T waves have replaced nonspecific T wave abnormality in Lateral leads  Confirmed by GENERATED REPORT, COMPUTER ([id number]),  Kennedi Pizano ([id number]) on 4/3/2023 2:09:00 PM     Troponin T, High Sensitivity    Collection Time: 04/03/23  8:23 PM   Result Value Ref Range    Troponin T, High Sensitivity 18 (H) <=14 ng/L   Magnesium    Collection Time: 04/03/23  8:23 PM   Result Value Ref Range    Magnesium 2.3 1.7 - 2.3 mg/dL   UA with Microscopic reflex to Culture    Collection Time: 04/04/23  1:18 AM    Specimen: Urine, Mc Catheter   Result Value Ref Range    Color Urine Light Yellow Colorless, Straw, Light Yellow, Yellow    Appearance Urine Clear Clear    Glucose Urine Negative Negative mg/dL    Bilirubin Urine Negative Negative    Ketones Urine Negative Negative mg/dL    Specific Gravity Urine 1.020 1.003 - 1.035    Blood Urine Negative Negative    pH Urine 6.5 5.0 - 7.0    Protein Albumin Urine Negative Negative mg/dL    Urobilinogen Urine Normal Normal, 2.0 mg/dL    Nitrite Urine Negative Negative    Leukocyte Esterase Urine Negative Negative    Mucus Urine Present (A) None Seen /LPF    RBC Urine 1 <=2 /HPF    WBC Urine <1 <=5 /HPF    Squamous Epithelials Urine <1 <=1 /HPF    Glucose by meter    Collection Time: 04/04/23  2:21 AM   Result Value Ref Range    GLUCOSE BY METER POCT 100 (H) 70 - 99 mg/dL   Glucose by meter    Collection Time: 04/04/23  5:58 AM   Result Value Ref Range    GLUCOSE BY METER POCT 121 (H) 70 - 99 mg/dL   Basic metabolic panel    Collection Time: 04/04/23  6:32 AM   Result Value Ref Range    Sodium 135 (L) 136 - 145 mmol/L    Potassium 4.4 3.4 - 5.3 mmol/L    Chloride 104 98 - 107 mmol/L    Carbon Dioxide (CO2) 23 22 - 29 mmol/L    Anion Gap 8 7 - 15 mmol/L    Urea Nitrogen 16.3 8.0 - 23.0 mg/dL    Creatinine 0.76 0.51 - 0.95 mg/dL    Calcium 8.7 (L) 8.8 - 10.2 mg/dL    Glucose 115 (H) 70 - 99 mg/dL    GFR Estimate 76 >60 mL/min/1.73m2   CBC with platelets and differential    Collection Time: 04/04/23  6:32 AM   Result Value Ref Range    WBC Count 7.0 4.0 - 11.0 10e3/uL    RBC Count 3.50 (L) 3.80 - 5.20 10e6/uL    Hemoglobin 10.0 (L) 11.7 - 15.7 g/dL    Hematocrit 29.8 (L) 35.0 - 47.0 %    MCV 85 78 - 100 fL    MCH 28.6 26.5 - 33.0 pg    MCHC 33.6 31.5 - 36.5 g/dL    RDW 14.3 10.0 - 15.0 %    Platelet Count 224 150 - 450 10e3/uL    % Neutrophils 78 %    % Lymphocytes 12 %    % Monocytes 10 %    % Eosinophils 0 %    % Basophils 0 %    % Immature Granulocytes 0 %    NRBCs per 100 WBC 0 <1 /100    Absolute Neutrophils 5.4 1.6 - 8.3 10e3/uL    Absolute Lymphocytes 0.9 0.8 - 5.3 10e3/uL    Absolute Monocytes 0.7 0.0 - 1.3 10e3/uL    Absolute Eosinophils 0.0 0.0 - 0.7 10e3/uL    Absolute Basophils 0.0 0.0 - 0.2 10e3/uL    Absolute Immature Granulocytes 0.0 <=0.4 10e3/uL    Absolute NRBCs 0.0 10e3/uL   Vitamin D Deficiency    Collection Time: 04/04/23  6:32 AM   Result Value Ref Range    Vitamin D, Total (25-Hydroxy) 70 20 - 75 ug/L   Adult Type and Screen    Collection Time: 04/04/23  6:32 AM   Result Value Ref Range    ABO/RH(D) A POS     Antibody Screen Negative Negative    SPECIMEN EXPIRATION DATE [id number]    Echocardiogram Limited    Collection Time:  04/04/23 10:33 AM   Result Value Ref Range    LVEF  65-70%    Hemoglobin    Collection Time: 04/05/23  7:56 AM   Result Value Ref Range    Hemoglobin 8.5 (L) 11.7 - 15.7 g/dL   Glucose    Collection Time: 04/05/23  7:56 AM   Result Value Ref Range    Glucose 89 70 - 99 mg/dL   Potassium    Collection Time: 04/05/23  7:56 AM   Result Value Ref Range    Potassium 3.8 3.4 - 5.3 mmol/L   Hemoglobin    Collection Time: 04/06/23  7:25 AM   Result Value Ref Range    Hemoglobin 8.4 (L) 11.7 - 15.7 g/dL   Potassium    Collection Time: 04/06/23  7:25 AM   Result Value Ref Range    Potassium 3.6 3.4 - 5.3 mmol/L   Glucose    Collection Time: 04/06/23  7:25 AM   Result Value Ref Range    Glucose 100 (H) 70 - 99 mg/dL   Basic metabolic panel    Collection Time: 04/11/23  6:13 AM   Result Value Ref Range    Sodium 138 136 - 145 mmol/L    Potassium 4.6 3.4 - 5.3 mmol/L    Chloride 103 98 - 107 mmol/L    Carbon Dioxide (CO2) 24 22 - 29 mmol/L    Anion Gap 11 7 - 15 mmol/L    Urea Nitrogen 17.9 8.0 - 23.0 mg/dL    Creatinine 0.73 0.51 - 0.95 mg/dL    Calcium 9.1 8.8 - 10.2 mg/dL    Glucose 99 70 - 99 mg/dL    GFR Estimate 80 >60 mL/min/1.73m2   Magnesium    Collection Time: 04/11/23  6:13 AM   Result Value Ref Range    Magnesium 2.1 1.7 - 2.3 mg/dL   CBC with platelets and differential    Collection Time: 04/11/23  6:13 AM   Result Value Ref Range    WBC Count 6.3 4.0 - 11.0 10e3/uL    RBC Count 3.18 (L) 3.80 - 5.20 10e6/uL    Hemoglobin 8.8 (L) 11.7 - 15.7 g/dL    Hematocrit 28.6 (L) 35.0 - 47.0 %    MCV 90 78 - 100 fL    MCH 27.7 26.5 - 33.0 pg    MCHC 30.8 (L) 31.5 - 36.5 g/dL    RDW 14.7 10.0 - 15.0 %    Platelet Count 420 150 - 450 10e3/uL    % Neutrophils 54 %    % Lymphocytes 24 %    % Monocytes 14 %    % Eosinophils 6 %    % Basophils 1 %    % Immature Granulocytes 1 %    NRBCs per 100 WBC 0 <1 /100    Absolute Neutrophils 3.4 1.6 - 8.3 10e3/uL    Absolute Lymphocytes 1.5 0.8 - 5.3 10e3/uL    Absolute Monocytes 0.9 0.0 - 1.3  10e3/uL    Absolute Eosinophils 0.4 0.0 - 0.7 10e3/uL    Absolute Basophils 0.1 0.0 - 0.2 10e3/uL    Absolute Immature Granulocytes 0.1 <=0.4 10e3/uL    Absolute NRBCs 0.0 10e3/uL             Electronically signed by    Sonya Cespedes MD

## 2023-04-10 NOTE — PROGRESS NOTES
[facility] GERIATRIC SERVICES  Chief Complaint   Patient presents with     [facility] F/U     [facility] [place] [facility] Record Number:  [id number]  Place of Service where encounter took place:  No question data found.  Code Status: Full Code     HISTORY:      HPI:  Inga Coats  is 83 year old (1940) undergoing physical  occupational  and speech therapy.who presented to the ER with her daughter for syncope/fall at home. Within the ED, she was found to have acute mildly communited fracture of the R femoral neck with anterior angulation. She underwent right hip hemiarthroplasty on 4/4/23.   Excerpted from records   Scalp laceration  Patient lives alone and reports she was walking out of her kitchen when the next thing she remembers she was getting up from the ground. She can't described any further details around the fall. It sounds like she went to bed normally however when she woke up she noticed her bathroom was full of blood.  She doesn't have any memory of the bleeding after her initial fall. After she fell again into her closet but this time she remembers the fall. She denies palpitations or feeling dizzy. She doesn't believe she had LOC but she is poor historian. She does remember R hip pain. Within the ED, EKG reviewed and normal. Troponin negative. Exam notable for head laceration. CT head negative for acute bleed.   * suspect that both falls mechanical in nature. She may have suffered mild concussion related to first fall. TTE w/o change from prior. Recent heart monitor demonstrated no arrhythmogenic concern for syncope. Tele here was without event by report. She does have residual deficits in ambulation related to recent CVA.      Today she was seen to review vital signs, labs, routine visit and to establish care.  She denied chest pain shortness of breath cough congestion constipation or diarrhea.  She does have a surgical incision right hip with a Aquaphor dressing.  She does move all extremities she was  alert and oriented to name and situation.  She was not able to tell writer the name of the facility or the date without looking at the calendar she does have 2 staples right scalp.  She reports she also has 2 sutures but they are dissolvable writer could not find the 2 sutures but did find the 2 staples.  She has with recent history of CVA 2 months ago       ALLERGIES:Shellfish-derived products and Fish-derived products    PAST MEDICAL HISTORY:   Past Medical History:   Diagnosis Date     Chronic kidney disease      CVA (cerebral vascular accident) (H)      Hypertension      S/P AVR      S/P CABG (coronary artery bypass graft)     x3     SVT (supraventricular tachycardia) (H)        PAST SURGICAL HISTORY:   has a past surgical history that includes Cardiac surgery and Open reduction internal fixation hip bipolar (Right, 4/4/2023).    FAMILY HISTORY: family history is not on file.    SOCIAL HISTORY:  reports that she has never smoked. She has never used smokeless tobacco. She reports that she does not currently use alcohol. She reports that she does not use drugs.    ROS:  Constitutional: Negative for activity change, appetite change, fatigue and fever.   HENT: Negative for congestion.    Respiratory: Negative for cough, shortness of breath and wheezing.    Cardiovascular: Negative for chest pain and leg swelling.   Gastrointestinal: Negative for abdominal distention, abdominal pain, constipation, diarrhea and nausea.   Genitourinary: Negative for dysuria.   Musculoskeletal: Negative for arthralgia. Negative for back pain.  Surgical incision right hip  Skin: Negative for color change and wound.  2 staples right scalp  Neurological: Negative for dizziness.   Psychiatric/Behavioral: Negative for agitation, behavioral problems and confusion.     Physical Exam:  Constitutional:       Appearance: Patient is well-developed.   HENT:      Head: Normocephalic.  2 staples right scalp  Eyes:      Conjunctiva/sclera:  Conjunctivae normal.   Neck:      Musculoskeletal: Normal range of motion.   Cardiovascular:      Rate and Rhythm: Normal rate and regular rhythm.      Heart sounds: Normal heart sounds. No murmur.   Pulmonary:      Effort: No respiratory distress.      Breath sounds: Normal breath sounds. No wheezing or rales.   Abdominal:      General: Bowel sounds are normal. There is no distension.      Palpations: Abdomen is soft.      Tenderness: There is no abdominal tenderness.   Musculoskeletal:       Normal range of motion.    Surgical incision right hip covered with Aquacel dressing  Skin:General:        Skin is warm.   Neurological:         Mental Status: Patient is alert and oriented to person, and situation  Psychiatric:         Behavior: Behavior normal.     Vitals:/51   Pulse 89   Temp 97.3  F (36.3  C)   Resp 18   Wt 63 kg (139 lb)   SpO2 96%   BMI 23.49 kg/m   and Body mass index is 23.49 kg/m .    Lab/Diagnostic data:   Recent Results (from the past 240 hour(s))   Basic metabolic panel    Collection Time: 04/03/23 11:31 AM   Result Value Ref Range    Sodium 132 (L) 136 - 145 mmol/L    Potassium 5.2 3.4 - 5.3 mmol/L    Chloride 97 (L) 98 - 107 mmol/L    Carbon Dioxide (CO2) 23 22 - 29 mmol/L    Anion Gap 12 7 - 15 mmol/L    Urea Nitrogen 26.5 (H) 8.0 - 23.0 mg/dL    Creatinine 0.75 0.51 - 0.95 mg/dL    Calcium 10.4 (H) 8.8 - 10.2 mg/dL    Glucose 171 (H) 70 - 99 mg/dL    GFR Estimate 78 >60 mL/min/1.73m2   Troponin T, High Sensitivity    Collection Time: 04/03/23 11:31 AM   Result Value Ref Range    Troponin T, High Sensitivity 11 <=14 ng/L   CBC with platelets and differential    Collection Time: 04/03/23 11:31 AM   Result Value Ref Range    WBC Count 11.2 (H) 4.0 - 11.0 10e3/uL    RBC Count 4.24 3.80 - 5.20 10e6/uL    Hemoglobin 11.9 11.7 - 15.7 g/dL    Hematocrit 35.2 35.0 - 47.0 %    MCV 83 78 - 100 fL    MCH 28.1 26.5 - 33.0 pg    MCHC 33.8 31.5 - 36.5 g/dL    RDW 13.8 10.0 - 15.0 %    Platelet  Count 304 150 - 450 10e3/uL    % Neutrophils 89 %    % Lymphocytes 6 %    % Monocytes 5 %    % Eosinophils 0 %    % Basophils 0 %    % Immature Granulocytes 0 %    NRBCs per 100 WBC 0 <1 /100    Absolute Neutrophils 10.0 (H) 1.6 - 8.3 10e3/uL    Absolute Lymphocytes 0.7 (L) 0.8 - 5.3 10e3/uL    Absolute Monocytes 0.6 0.0 - 1.3 10e3/uL    Absolute Eosinophils 0.0 0.0 - 0.7 10e3/uL    Absolute Basophils 0.0 0.0 - 0.2 10e3/uL    Absolute Immature Granulocytes 0.0 <=0.4 10e3/uL    Absolute NRBCs 0.0 10e3/uL   Extra Blue Top Tube    Collection Time: 04/03/23 11:32 AM   Result Value Ref Range    Hold Specimen [facility]    EKG 12-lead, tracing only    Collection Time: 04/03/23  1:01 PM   Result Value Ref Range    Systolic Blood Pressure  mmHg    Diastolic Blood Pressure  mmHg    Ventricular Rate 79 BPM    Atrial Rate 79 BPM    MN Interval 186 ms    QRS Duration 74 ms     ms    QTc 433 ms    P Axis 63 degrees    R AXIS 22 degrees    T Axis 112 degrees    Interpretation ECG       Sinus rhythm  Possible Left atrial enlargement  Cannot rule out Anterior infarct , age undetermined  Abnormal ECG  When compared with ECG of 30-DEC-2022 11:22,  Inverted T waves have replaced nonspecific T wave abnormality in Lateral leads  Confirmed by GENERATED REPORT, COMPUTER ([id number]),  Kennedi Pizano ([id number]) on 4/3/2023 2:09:00 PM     Troponin T, High Sensitivity    Collection Time: 04/03/23  8:23 PM   Result Value Ref Range    Troponin T, High Sensitivity 18 (H) <=14 ng/L   Magnesium    Collection Time: 04/03/23  8:23 PM   Result Value Ref Range    Magnesium 2.3 1.7 - 2.3 mg/dL   UA with Microscopic reflex to Culture    Collection Time: 04/04/23  1:18 AM    Specimen: Urine, Mc Catheter   Result Value Ref Range    Color Urine Light Yellow Colorless, Straw, Light Yellow, Yellow    Appearance Urine Clear Clear    Glucose Urine Negative Negative mg/dL    Bilirubin Urine Negative Negative    Ketones Urine Negative Negative mg/dL    Specific  Gravity Urine 1.020 1.003 - 1.035    Blood Urine Negative Negative    pH Urine 6.5 5.0 - 7.0    Protein Albumin Urine Negative Negative mg/dL    Urobilinogen Urine Normal Normal, 2.0 mg/dL    Nitrite Urine Negative Negative    Leukocyte Esterase Urine Negative Negative    Mucus Urine Present (A) None Seen /LPF    RBC Urine 1 <=2 /HPF    WBC Urine <1 <=5 /HPF    Squamous Epithelials Urine <1 <=1 /HPF   Glucose by meter    Collection Time: 04/04/23  2:21 AM   Result Value Ref Range    GLUCOSE BY METER POCT 100 (H) 70 - 99 mg/dL   Glucose by meter    Collection Time: 04/04/23  5:58 AM   Result Value Ref Range    GLUCOSE BY METER POCT 121 (H) 70 - 99 mg/dL   Basic metabolic panel    Collection Time: 04/04/23  6:32 AM   Result Value Ref Range    Sodium 135 (L) 136 - 145 mmol/L    Potassium 4.4 3.4 - 5.3 mmol/L    Chloride 104 98 - 107 mmol/L    Carbon Dioxide (CO2) 23 22 - 29 mmol/L    Anion Gap 8 7 - 15 mmol/L    Urea Nitrogen 16.3 8.0 - 23.0 mg/dL    Creatinine 0.76 0.51 - 0.95 mg/dL    Calcium 8.7 (L) 8.8 - 10.2 mg/dL    Glucose 115 (H) 70 - 99 mg/dL    GFR Estimate 76 >60 mL/min/1.73m2   CBC with platelets and differential    Collection Time: 04/04/23  6:32 AM   Result Value Ref Range    WBC Count 7.0 4.0 - 11.0 10e3/uL    RBC Count 3.50 (L) 3.80 - 5.20 10e6/uL    Hemoglobin 10.0 (L) 11.7 - 15.7 g/dL    Hematocrit 29.8 (L) 35.0 - 47.0 %    MCV 85 78 - 100 fL    MCH 28.6 26.5 - 33.0 pg    MCHC 33.6 31.5 - 36.5 g/dL    RDW 14.3 10.0 - 15.0 %    Platelet Count 224 150 - 450 10e3/uL    % Neutrophils 78 %    % Lymphocytes 12 %    % Monocytes 10 %    % Eosinophils 0 %    % Basophils 0 %    % Immature Granulocytes 0 %    NRBCs per 100 WBC 0 <1 /100    Absolute Neutrophils 5.4 1.6 - 8.3 10e3/uL    Absolute Lymphocytes 0.9 0.8 - 5.3 10e3/uL    Absolute Monocytes 0.7 0.0 - 1.3 10e3/uL    Absolute Eosinophils 0.0 0.0 - 0.7 10e3/uL    Absolute Basophils 0.0 0.0 - 0.2 10e3/uL    Absolute Immature Granulocytes 0.0 <=0.4 10e3/uL     Absolute NRBCs 0.0 10e3/uL   Vitamin D Deficiency    Collection Time: 04/04/23  6:32 AM   Result Value Ref Range    Vitamin D, Total (25-Hydroxy) 70 20 - 75 ug/L   Adult Type and Screen    Collection Time: 04/04/23  6:32 AM   Result Value Ref Range    ABO/RH(D) A POS     Antibody Screen Negative Negative    SPECIMEN EXPIRATION DATE [id number]    Echocardiogram Limited    Collection Time: 04/04/23 10:33 AM   Result Value Ref Range    LVEF  65-70%    Hemoglobin    Collection Time: 04/05/23  7:56 AM   Result Value Ref Range    Hemoglobin 8.5 (L) 11.7 - 15.7 g/dL   Glucose    Collection Time: 04/05/23  7:56 AM   Result Value Ref Range    Glucose 89 70 - 99 mg/dL   Potassium    Collection Time: 04/05/23  7:56 AM   Result Value Ref Range    Potassium 3.8 3.4 - 5.3 mmol/L   Hemoglobin    Collection Time: 04/06/23  7:25 AM   Result Value Ref Range    Hemoglobin 8.4 (L) 11.7 - 15.7 g/dL   Potassium    Collection Time: 04/06/23  7:25 AM   Result Value Ref Range    Potassium 3.6 3.4 - 5.3 mmol/L   Glucose    Collection Time: 04/06/23  7:25 AM   Result Value Ref Range    Glucose 100 (H) 70 - 99 mg/dL         MEDICATIONS:     Review of your medicines          Accurate as of April 10, 2023  7:53 AM. If you have any questions, ask your nurse or doctor.            CONTINUE these medicines which may have CHANGED, or have new prescriptions. If we are uncertain of the size of tablets/capsules you have at home, strength may be listed as something that might have changed.      Dose / Directions   multivitamin tablet  This may have changed: Another medication with the same name was removed. Continue taking this medication, and follow the directions you see here.  Changed by: Adamaris Whatley CNP      Dose: 1 tablet  Take 1 tablet by mouth daily  Refills: 0        CONTINUE these medicines which have NOT CHANGED      Dose / Directions   acetaminophen 325 MG tablet  Commonly known as: TYLENOL  Used for: Closed fracture of neck of right  femur, initial encounter (H)      Dose: 650 mg  Take 2 tablets (650 mg) by mouth every 6 hours as needed for other (For optimal non-opioid multimodal pain management to improve pain control.)  Quantity: 100 tablet  Refills: 0     amLODIPine 10 MG tablet  Commonly known as: NORVASC  Used for: Cerebrovascular accident (CVA), unspecified mechanism (H)      Dose: 5 mg  Take 0.5 tablets (5 mg) by mouth daily  Refills: 0     aspirin 81 MG EC tablet  Commonly known as: ASA  Indication: VTE Prophylaxis  Used for: Closed fracture of neck of right femur, initial encounter (H)      Dose: 81 mg  Take 1 tablet (81 mg) by mouth 2 times daily for 42 days  Quantity: 84 tablet  Refills: 0     atorvastatin 80 MG tablet  Commonly known as: LIPITOR  Used for: Cerebrovascular accident (CVA), unspecified mechanism (H)      Dose: 80 mg  Take 1 tablet (80 mg) by mouth every evening  Quantity: 30 tablet  Refills: 0     hydrOXYzine 10 MG tablet  Commonly known as: ATARAX  Used for: Closed fracture of neck of right femur, initial encounter (H)      Dose: 10 mg  Take 1 tablet (10 mg) by mouth every 6 hours as needed for other (adjuvant pain)  Quantity: 20 tablet  Refills: 0     ondansetron 4 MG ODT tab  Commonly known as: ZOFRAN ODT  Used for: Closed fracture of neck of right femur, initial encounter (H)      Dose: 4 mg  Take 1 tablet (4 mg) by mouth every 6 hours as needed for nausea or vomiting  Quantity: 5 tablet  Refills: 0     OSTEO BI-FLEX ADV TRIPLE ST PO      Dose: 2 capsule  Take 2 capsules by mouth daily  Refills: 0     oxyCODONE 5 MG tablet  Commonly known as: ROXICODONE  Used for: Closed fracture of neck of right femur, initial encounter (H)      Dose: 5 mg  Take 1 tablet (5 mg) by mouth every 4 hours as needed for moderate pain  Quantity: 20 tablet  Refills: 0     polyethylene glycol 17 GM/Dose powder  Commonly known as: MIRALAX  Used for: Closed fracture of neck of right femur, initial encounter (H)      Dose: 17 g  Take 17 g by  mouth daily  Quantity: 510 g  Refills: 0     Prevagen 10 MG Caps  Generic drug: Apoaequorin      Dose: 1 capsule  Take 1 capsule by mouth daily  Refills: 0     senna-docusate 8.6-50 MG tablet  Commonly known as: SENOKOT-S/PERICOLACE  Used for: Closed fracture of neck of right femur, initial encounter (H)      Dose: 1 tablet  Take 1 tablet by mouth 2 times daily  Quantity: 21 tablet  Refills: 0     vitamin D3 50 mcg (2000 units) tablet  Commonly known as: CHOLECALCIFEROL      Dose: 1 tablet  Take 1 tablet by mouth daily  Refills: 0            ASSESSMENT/PLAN  Encounter Diagnoses   Name Primary?     Closed fracture of neck of right femur, initial encounter (H) Yes     Pain management      Physical deconditioning      Cerebrovascular accident (CVA), unspecified mechanism (H)      Closed head injury, initial encounter      Right femur fracture status post right hip hemiarthroplasty, follow-up with orthopedics pain management    Pain management Tylenol every 6 hours as needed, as needed oxycodone, hydroxyzine until 4/21/2023    Physical deconditioning PT OT speech    History recent CVA approximately 2 months ago completed Plavix resumed atorvastatin asa    Anticoagulation management aspirin 81mg twice daily x42 days    Closed head injury currently with staplesto be removed on 4/13/2023    Hypertension on Norvasc    Electronically signed by: Adamaris Whatley CNP

## 2023-04-10 NOTE — LETTER
4/10/2023        RE: Inga Coats  [id number]  [address]  [facility] [address]        [facility]  Chief Complaint   Patient presents with     Hospital F/U     [facility] Record Number:  [id number]  Place of Service where encounter took place:  No question data found.  Code Status: Full Code     HISTORY:      HPI:  Inga Coats  is 83 year old (1940) undergoing physical  occupational  and speech therapy.who presented to the ER with her daughter for syncope/fall at home. Within the ED, she was found to have acute mildly communited fracture of the R femoral neck with anterior angulation. She underwent right hip hemiarthroplasty on 4/4/23.   Excerpted from records   Scalp laceration  Patient lives alone and reports she was walking out of her kitchen when the next thing she remembers she was getting up from the ground. She can't described any further details around the fall. It sounds like she went to bed normally however when she woke up she noticed her bathroom was full of blood.  She doesn't have any memory of the bleeding after her initial fall. After she fell again into her closet but this time she remembers the fall. She denies palpitations or feeling dizzy. She doesn't believe she had LOC but she is poor historian. She does remember R hip pain. Within the ED, EKG reviewed and normal. Troponin negative. Exam notable for head laceration. CT head negative for acute bleed.   * suspect that both falls mechanical in nature. She may have suffered mild concussion related to first fall. TTE w/o change from prior. Recent heart monitor demonstrated no arrhythmogenic concern for syncope. Tele here was without event by report. She does have residual deficits in ambulation related to recent CVA.      Today she was seen to review vital signs, labs, routine visit and to establish care.  She denied chest pain shortness of breath cough congestion constipation or diarrhea.  She  does have a surgical incision right hip with a Aquaphor dressing.  She does move all extremities she was alert and oriented to name and situation.  She was not able to tell writer the name of the facility or the date without looking at the calendar she does have 2 staples right scalp.  She reports she also has 2 sutures but they are dissolvable writer could not find the 2 sutures but did find the 2 staples.  She has with recent history of CVA 2 months ago       ALLERGIES:Shellfish-derived products and Fish-derived products    PAST MEDICAL HISTORY:   Past Medical History:   Diagnosis Date     Chronic kidney disease      CVA (cerebral vascular accident) (H)      Hypertension      S/P AVR      S/P CABG (coronary artery bypass graft)     x3     SVT (supraventricular tachycardia) (H)        PAST SURGICAL HISTORY:   has a past surgical history that includes Cardiac surgery and Open reduction internal fixation hip bipolar (Right, 4/4/2023).    FAMILY HISTORY: family history is not on file.    SOCIAL HISTORY:  reports that she has never smoked. She has never used smokeless tobacco. She reports that she does not currently use alcohol. She reports that she does not use drugs.    ROS:  Constitutional: Negative for activity change, appetite change, fatigue and fever.   HENT: Negative for congestion.    Respiratory: Negative for cough, shortness of breath and wheezing.    Cardiovascular: Negative for chest pain and leg swelling.   Gastrointestinal: Negative for abdominal distention, abdominal pain, constipation, diarrhea and nausea.   Genitourinary: Negative for dysuria.   Musculoskeletal: Negative for arthralgia. Negative for back pain.  Surgical incision right hip  Skin: Negative for color change and wound.  2 staples right scalp  Neurological: Negative for dizziness.   Psychiatric/Behavioral: Negative for agitation, behavioral problems and confusion.     Physical Exam:  Constitutional:       Appearance: Patient is  well-developed.   HENT:      Head: Normocephalic.  2 staples right scalp  Eyes:      Conjunctiva/sclera: Conjunctivae normal.   Neck:      Musculoskeletal: Normal range of motion.   Cardiovascular:      Rate and Rhythm: Normal rate and regular rhythm.      Heart sounds: Normal heart sounds. No murmur.   Pulmonary:      Effort: No respiratory distress.      Breath sounds: Normal breath sounds. No wheezing or rales.   Abdominal:      General: Bowel sounds are normal. There is no distension.      Palpations: Abdomen is soft.      Tenderness: There is no abdominal tenderness.   Musculoskeletal:       Normal range of motion.    Surgical incision right hip covered with Aquacel dressing  Skin:General:        Skin is warm.   Neurological:         Mental Status: Patient is alert and oriented to person, and situation  Psychiatric:         Behavior: Behavior normal.     Vitals:/51   Pulse 89   Temp 97.3  F (36.3  C)   Resp 18   Wt 63 kg (139 lb)   SpO2 96%   BMI 23.49 kg/m   and Body mass index is 23.49 kg/m .    Lab/Diagnostic data:   Recent Results (from the past 240 hour(s))   Basic metabolic panel    Collection Time: 04/03/23 11:31 AM   Result Value Ref Range    Sodium 132 (L) 136 - 145 mmol/L    Potassium 5.2 3.4 - 5.3 mmol/L    Chloride 97 (L) 98 - 107 mmol/L    Carbon Dioxide (CO2) 23 22 - 29 mmol/L    Anion Gap 12 7 - 15 mmol/L    Urea Nitrogen 26.5 (H) 8.0 - 23.0 mg/dL    Creatinine 0.75 0.51 - 0.95 mg/dL    Calcium 10.4 (H) 8.8 - 10.2 mg/dL    Glucose 171 (H) 70 - 99 mg/dL    GFR Estimate 78 >60 mL/min/1.73m2   Troponin T, High Sensitivity    Collection Time: 04/03/23 11:31 AM   Result Value Ref Range    Troponin T, High Sensitivity 11 <=14 ng/L   CBC with platelets and differential    Collection Time: 04/03/23 11:31 AM   Result Value Ref Range    WBC Count 11.2 (H) 4.0 - 11.0 10e3/uL    RBC Count 4.24 3.80 - 5.20 10e6/uL    Hemoglobin 11.9 11.7 - 15.7 g/dL    Hematocrit 35.2 35.0 - 47.0 %    MCV 83 78 -  100 fL    MCH 28.1 26.5 - 33.0 pg    MCHC 33.8 31.5 - 36.5 g/dL    RDW 13.8 10.0 - 15.0 %    Platelet Count 304 150 - 450 10e3/uL    % Neutrophils 89 %    % Lymphocytes 6 %    % Monocytes 5 %    % Eosinophils 0 %    % Basophils 0 %    % Immature Granulocytes 0 %    NRBCs per 100 WBC 0 <1 /100    Absolute Neutrophils 10.0 (H) 1.6 - 8.3 10e3/uL    Absolute Lymphocytes 0.7 (L) 0.8 - 5.3 10e3/uL    Absolute Monocytes 0.6 0.0 - 1.3 10e3/uL    Absolute Eosinophils 0.0 0.0 - 0.7 10e3/uL    Absolute Basophils 0.0 0.0 - 0.2 10e3/uL    Absolute Immature Granulocytes 0.0 <=0.4 10e3/uL    Absolute NRBCs 0.0 10e3/uL   Extra Blue Top Tube    Collection Time: 04/03/23 11:32 AM   Result Value Ref Range    Hold Specimen [facility]    EKG 12-lead, tracing only    Collection Time: 04/03/23  1:01 PM   Result Value Ref Range    Systolic Blood Pressure  mmHg    Diastolic Blood Pressure  mmHg    Ventricular Rate 79 BPM    Atrial Rate 79 BPM    AR Interval 186 ms    QRS Duration 74 ms     ms    QTc 433 ms    P Axis 63 degrees    R AXIS 22 degrees    T Axis 112 degrees    Interpretation ECG       Sinus rhythm  Possible Left atrial enlargement  Cannot rule out Anterior infarct , age undetermined  Abnormal ECG  When compared with ECG of 30-DEC-2022 11:22,  Inverted T waves have replaced nonspecific T wave abnormality in Lateral leads  Confirmed by GENERATED REPORT, COMPUTER ([id number]),  Kennedi Pizano ([id number]) on 4/3/2023 2:09:00 PM     Troponin T, High Sensitivity    Collection Time: 04/03/23  8:23 PM   Result Value Ref Range    Troponin T, High Sensitivity 18 (H) <=14 ng/L   Magnesium    Collection Time: 04/03/23  8:23 PM   Result Value Ref Range    Magnesium 2.3 1.7 - 2.3 mg/dL   UA with Microscopic reflex to Culture    Collection Time: 04/04/23  1:18 AM    Specimen: Urine, Mc Catheter   Result Value Ref Range    Color Urine Light Yellow Colorless, Straw, Light Yellow, Yellow    Appearance Urine Clear Clear    Glucose Urine Negative Negative  mg/dL    Bilirubin Urine Negative Negative    Ketones Urine Negative Negative mg/dL    Specific Gravity Urine 1.020 1.003 - 1.035    Blood Urine Negative Negative    pH Urine 6.5 5.0 - 7.0    Protein Albumin Urine Negative Negative mg/dL    Urobilinogen Urine Normal Normal, 2.0 mg/dL    Nitrite Urine Negative Negative    Leukocyte Esterase Urine Negative Negative    Mucus Urine Present (A) None Seen /LPF    RBC Urine 1 <=2 /HPF    WBC Urine <1 <=5 /HPF    Squamous Epithelials Urine <1 <=1 /HPF   Glucose by meter    Collection Time: 04/04/23  2:21 AM   Result Value Ref Range    GLUCOSE BY METER POCT 100 (H) 70 - 99 mg/dL   Glucose by meter    Collection Time: 04/04/23  5:58 AM   Result Value Ref Range    GLUCOSE BY METER POCT 121 (H) 70 - 99 mg/dL   Basic metabolic panel    Collection Time: 04/04/23  6:32 AM   Result Value Ref Range    Sodium 135 (L) 136 - 145 mmol/L    Potassium 4.4 3.4 - 5.3 mmol/L    Chloride 104 98 - 107 mmol/L    Carbon Dioxide (CO2) 23 22 - 29 mmol/L    Anion Gap 8 7 - 15 mmol/L    Urea Nitrogen 16.3 8.0 - 23.0 mg/dL    Creatinine 0.76 0.51 - 0.95 mg/dL    Calcium 8.7 (L) 8.8 - 10.2 mg/dL    Glucose 115 (H) 70 - 99 mg/dL    GFR Estimate 76 >60 mL/min/1.73m2   CBC with platelets and differential    Collection Time: 04/04/23  6:32 AM   Result Value Ref Range    WBC Count 7.0 4.0 - 11.0 10e3/uL    RBC Count 3.50 (L) 3.80 - 5.20 10e6/uL    Hemoglobin 10.0 (L) 11.7 - 15.7 g/dL    Hematocrit 29.8 (L) 35.0 - 47.0 %    MCV 85 78 - 100 fL    MCH 28.6 26.5 - 33.0 pg    MCHC 33.6 31.5 - 36.5 g/dL    RDW 14.3 10.0 - 15.0 %    Platelet Count 224 150 - 450 10e3/uL    % Neutrophils 78 %    % Lymphocytes 12 %    % Monocytes 10 %    % Eosinophils 0 %    % Basophils 0 %    % Immature Granulocytes 0 %    NRBCs per 100 WBC 0 <1 /100    Absolute Neutrophils 5.4 1.6 - 8.3 10e3/uL    Absolute Lymphocytes 0.9 0.8 - 5.3 10e3/uL    Absolute Monocytes 0.7 0.0 - 1.3 10e3/uL    Absolute Eosinophils 0.0 0.0 - 0.7 10e3/uL     Absolute Basophils 0.0 0.0 - 0.2 10e3/uL    Absolute Immature Granulocytes 0.0 <=0.4 10e3/uL    Absolute NRBCs 0.0 10e3/uL   Vitamin D Deficiency    Collection Time: 04/04/23  6:32 AM   Result Value Ref Range    Vitamin D, Total (25-Hydroxy) 70 20 - 75 ug/L   Adult Type and Screen    Collection Time: 04/04/23  6:32 AM   Result Value Ref Range    ABO/RH(D) A POS     Antibody Screen Negative Negative    SPECIMEN EXPIRATION DATE [id number]    Echocardiogram Limited    Collection Time: 04/04/23 10:33 AM   Result Value Ref Range    LVEF  65-70%    Hemoglobin    Collection Time: 04/05/23  7:56 AM   Result Value Ref Range    Hemoglobin 8.5 (L) 11.7 - 15.7 g/dL   Glucose    Collection Time: 04/05/23  7:56 AM   Result Value Ref Range    Glucose 89 70 - 99 mg/dL   Potassium    Collection Time: 04/05/23  7:56 AM   Result Value Ref Range    Potassium 3.8 3.4 - 5.3 mmol/L   Hemoglobin    Collection Time: 04/06/23  7:25 AM   Result Value Ref Range    Hemoglobin 8.4 (L) 11.7 - 15.7 g/dL   Potassium    Collection Time: 04/06/23  7:25 AM   Result Value Ref Range    Potassium 3.6 3.4 - 5.3 mmol/L   Glucose    Collection Time: 04/06/23  7:25 AM   Result Value Ref Range    Glucose 100 (H) 70 - 99 mg/dL         MEDICATIONS:     Review of your medicines          Accurate as of April 10, 2023  7:53 AM. If you have any questions, ask your nurse or doctor.            CONTINUE these medicines which may have CHANGED, or have new prescriptions. If we are uncertain of the size of tablets/capsules you have at home, strength may be listed as something that might have changed.      Dose / Directions   multivitamin tablet  This may have changed: Another medication with the same name was removed. Continue taking this medication, and follow the directions you see here.  Changed by: Adaamris Whatley CNP      Dose: 1 tablet  Take 1 tablet by mouth daily  Refills: 0        CONTINUE these medicines which have NOT CHANGED      Dose / Directions    acetaminophen 325 MG tablet  Commonly known as: TYLENOL  Used for: Closed fracture of neck of right femur, initial encounter (H)      Dose: 650 mg  Take 2 tablets (650 mg) by mouth every 6 hours as needed for other (For optimal non-opioid multimodal pain management to improve pain control.)  Quantity: 100 tablet  Refills: 0     amLODIPine 10 MG tablet  Commonly known as: NORVASC  Used for: Cerebrovascular accident (CVA), unspecified mechanism (H)      Dose: 5 mg  Take 0.5 tablets (5 mg) by mouth daily  Refills: 0     aspirin 81 MG EC tablet  Commonly known as: ASA  Indication: VTE Prophylaxis  Used for: Closed fracture of neck of right femur, initial encounter (H)      Dose: 81 mg  Take 1 tablet (81 mg) by mouth 2 times daily for 42 days  Quantity: 84 tablet  Refills: 0     atorvastatin 80 MG tablet  Commonly known as: LIPITOR  Used for: Cerebrovascular accident (CVA), unspecified mechanism (H)      Dose: 80 mg  Take 1 tablet (80 mg) by mouth every evening  Quantity: 30 tablet  Refills: 0     hydrOXYzine 10 MG tablet  Commonly known as: ATARAX  Used for: Closed fracture of neck of right femur, initial encounter (H)      Dose: 10 mg  Take 1 tablet (10 mg) by mouth every 6 hours as needed for other (adjuvant pain)  Quantity: 20 tablet  Refills: 0     ondansetron 4 MG ODT tab  Commonly known as: ZOFRAN ODT  Used for: Closed fracture of neck of right femur, initial encounter (H)      Dose: 4 mg  Take 1 tablet (4 mg) by mouth every 6 hours as needed for nausea or vomiting  Quantity: 5 tablet  Refills: 0     OSTEO BI-FLEX ADV TRIPLE ST PO      Dose: 2 capsule  Take 2 capsules by mouth daily  Refills: 0     oxyCODONE 5 MG tablet  Commonly known as: ROXICODONE  Used for: Closed fracture of neck of right femur, initial encounter (H)      Dose: 5 mg  Take 1 tablet (5 mg) by mouth every 4 hours as needed for moderate pain  Quantity: 20 tablet  Refills: 0     polyethylene glycol 17 GM/Dose powder  Commonly known as:  MIRALAX  Used for: Closed fracture of neck of right femur, initial encounter (H)      Dose: 17 g  Take 17 g by mouth daily  Quantity: 510 g  Refills: 0     Prevagen 10 MG Caps  Generic drug: Apoaequorin      Dose: 1 capsule  Take 1 capsule by mouth daily  Refills: 0     senna-docusate 8.6-50 MG tablet  Commonly known as: SENOKOT-S/PERICOLACE  Used for: Closed fracture of neck of right femur, initial encounter (H)      Dose: 1 tablet  Take 1 tablet by mouth 2 times daily  Quantity: 21 tablet  Refills: 0     vitamin D3 50 mcg (2000 units) tablet  Commonly known as: CHOLECALCIFEROL      Dose: 1 tablet  Take 1 tablet by mouth daily  Refills: 0            ASSESSMENT/PLAN  Encounter Diagnoses   Name Primary?     Closed fracture of neck of right femur, initial encounter (H) Yes     Pain management      Physical deconditioning      Cerebrovascular accident (CVA), unspecified mechanism (H)      Closed head injury, initial encounter      Right femur fracture status post right hip hemiarthroplasty, follow-up with orthopedics pain management    Pain management Tylenol every 6 hours as needed, as needed oxycodone, hydroxyzine until 4/21/2023    Physical deconditioning PT OT speech    History recent CVA approximately 2 months ago completed Plavix resumed atorvastatin asa    Anticoagulation management aspirin 81mg twice daily x42 days    Closed head injury currently with staplesto be removed on 4/13/2023    Hypertension on Norvasc    Electronically signed by: Adamaris Whatley CNP        Sincerely,        Adamaris Whatley CNP

## 2023-04-11 ENCOUNTER — TELEPHONE (OUTPATIENT)
Dept: GERIATRICS | Facility: CLINIC | Age: 83
End: 2023-04-11

## 2023-04-11 ENCOUNTER — TRANSITIONAL CARE UNIT VISIT (OUTPATIENT)
Dept: GERIATRICS | Facility: CLINIC | Age: 83
End: 2023-04-11
Payer: COMMERCIAL

## 2023-04-11 VITALS
SYSTOLIC BLOOD PRESSURE: 121 MMHG | HEIGHT: 64 IN | RESPIRATION RATE: 16 BRPM | BODY MASS INDEX: 22.71 KG/M2 | DIASTOLIC BLOOD PRESSURE: 64 MMHG | HEART RATE: 83 BPM | WEIGHT: 133 LBS | TEMPERATURE: 97.4 F | OXYGEN SATURATION: 92 %

## 2023-04-11 DIAGNOSIS — I10 ESSENTIAL HYPERTENSION: ICD-10-CM

## 2023-04-11 DIAGNOSIS — S09.90XA CLOSED HEAD INJURY, INITIAL ENCOUNTER: ICD-10-CM

## 2023-04-11 DIAGNOSIS — I63.9 CEREBROVASCULAR ACCIDENT (CVA), UNSPECIFIED MECHANISM (H): ICD-10-CM

## 2023-04-11 DIAGNOSIS — R52 PAIN MANAGEMENT: ICD-10-CM

## 2023-04-11 DIAGNOSIS — S72.001A CLOSED FRACTURE OF NECK OF RIGHT FEMUR, INITIAL ENCOUNTER (H): Primary | ICD-10-CM

## 2023-04-11 DIAGNOSIS — D62 ABLA (ACUTE BLOOD LOSS ANEMIA): ICD-10-CM

## 2023-04-11 LAB
ANION GAP SERPL CALCULATED.3IONS-SCNC: 11 MMOL/L (ref 7–15)
BASOPHILS # BLD AUTO: 0.1 10E3/UL (ref 0–0.2)
BASOPHILS NFR BLD AUTO: 1 %
BUN SERPL-MCNC: 17.9 MG/DL (ref 8–23)
CALCIUM SERPL-MCNC: 9.1 MG/DL (ref 8.8–10.2)
CHLORIDE SERPL-SCNC: 103 MMOL/L (ref 98–107)
CREAT SERPL-MCNC: 0.73 MG/DL (ref 0.51–0.95)
DEPRECATED HCO3 PLAS-SCNC: 24 MMOL/L (ref 22–29)
EOSINOPHIL # BLD AUTO: 0.4 10E3/UL (ref 0–0.7)
EOSINOPHIL NFR BLD AUTO: 6 %
ERYTHROCYTE [DISTWIDTH] IN BLOOD BY AUTOMATED COUNT: 14.7 % (ref 10–15)
GFR SERPL CREATININE-BSD FRML MDRD: 80 ML/MIN/1.73M2
GLUCOSE SERPL-MCNC: 99 MG/DL (ref 70–99)
HCT VFR BLD AUTO: 28.6 % (ref 35–47)
HGB BLD-MCNC: 8.8 G/DL (ref 11.7–15.7)
IMM GRANULOCYTES # BLD: 0.1 10E3/UL
IMM GRANULOCYTES NFR BLD: 1 %
LYMPHOCYTES # BLD AUTO: 1.5 10E3/UL (ref 0.8–5.3)
LYMPHOCYTES NFR BLD AUTO: 24 %
MAGNESIUM SERPL-MCNC: 2.1 MG/DL (ref 1.7–2.3)
MCH RBC QN AUTO: 27.7 PG (ref 26.5–33)
MCHC RBC AUTO-ENTMCNC: 30.8 G/DL (ref 31.5–36.5)
MCV RBC AUTO: 90 FL (ref 78–100)
MONOCYTES # BLD AUTO: 0.9 10E3/UL (ref 0–1.3)
MONOCYTES NFR BLD AUTO: 14 %
NEUTROPHILS # BLD AUTO: 3.4 10E3/UL (ref 1.6–8.3)
NEUTROPHILS NFR BLD AUTO: 54 %
NRBC # BLD AUTO: 0 10E3/UL
NRBC BLD AUTO-RTO: 0 /100
PLATELET # BLD AUTO: 420 10E3/UL (ref 150–450)
POTASSIUM SERPL-SCNC: 4.6 MMOL/L (ref 3.4–5.3)
RBC # BLD AUTO: 3.18 10E6/UL (ref 3.8–5.2)
SODIUM SERPL-SCNC: 138 MMOL/L (ref 136–145)
WBC # BLD AUTO: 6.3 10E3/UL (ref 4–11)

## 2023-04-11 PROCEDURE — 85025 COMPLETE CBC W/AUTO DIFF WBC: CPT | Mod: ORL | Performed by: FAMILY MEDICINE

## 2023-04-11 PROCEDURE — P9604 ONE-WAY ALLOW PRORATED TRIP: HCPCS | Mod: ORL | Performed by: FAMILY MEDICINE

## 2023-04-11 PROCEDURE — 80048 BASIC METABOLIC PNL TOTAL CA: CPT | Mod: ORL | Performed by: FAMILY MEDICINE

## 2023-04-11 PROCEDURE — 83735 ASSAY OF MAGNESIUM: CPT | Mod: ORL | Performed by: FAMILY MEDICINE

## 2023-04-11 PROCEDURE — 99305 1ST NF CARE MODERATE MDM 35: CPT | Performed by: FAMILY MEDICINE

## 2023-04-11 PROCEDURE — 36415 COLL VENOUS BLD VENIPUNCTURE: CPT | Mod: ORL | Performed by: FAMILY MEDICINE

## 2023-04-11 NOTE — LETTER
4/11/2023        RE: Inga Coats  [address]  [facility] [address]        [facility] GERIATRIC SERVICES       Patient Inga Coats  MRN: [id number]        Reason for Visit     Chief Complaint   Patient presents with     RECHECK     INITIAL       Code Status     CPR/Full code     Assessment     History of a right femoral neck fracture with angulation status post right hip hemiarthroplasty for 4 / 4 /23  Syncope with fall  abla  Scalp laceration  Recent h/o of CVA  CAD  CKD 3a  History of hyponatremia  History of hypercalcemia  HTN  Generalized weakness    Plan     Pt is admitted to TCU for strengthening and rehab.  Pt admitted to TCU after undergoing right hip hemiarthroplasty /hip surgery  Date of procedure-4/4/2023  Continue with incisional cares  WBAT  Follow-up with orthopedics as scheduled  Cont PT / OT for strengthening/ gait retraining  Pain management optimized  Tylenol scheduled 1 g every 8 hours  Continue narcotics prn-  Advised aggressive icing  On asa 81mg for dvt prophylaxis  Duration to be determined by orthopedics  Tubigrip stockings recommended for management of swelling of the lower extremities     However patient is concerned about increased swelling in her right lower extremity which has never happened before as per her  Doppler ultrasound of the right lower extremity ordered to rule out a DVT  Advised compression stockings if negative  Also recently had a CVA and resultant right-sided weakness  She reports she was back to normal and even independent with driving and using a cane just for support  Recheck labs reviewed in the chart today.  Hemoglobin today is on recheck 8.8 which is improved  Electrolytes and kidney functions are normal  Continue with PT/OT-at baseline has right-sided weakness  Scalp laceration is healing    History     Patient is a very pleasant 83 year old female who is admitted to TCU  Patient was admitted to the hospital for syncope resulting  in a fall and a scalp laceration  She also had a right hip fracture and underwent right hip hemiarthroplasty on 4/4/2023.  Discharge to the TCU for rehab    Past Medical & Surgical History     PAST MEDICAL HISTORY:   Past Medical History:   Diagnosis Date     Chronic kidney disease      CVA (cerebral vascular accident) (H)      Hypertension      S/P AVR      S/P CABG (coronary artery bypass graft)     x3     SVT (supraventricular tachycardia) (H)       PAST SURGICAL HISTORY:   has a past surgical history that includes Cardiac surgery and Open reduction internal fixation hip bipolar (Right, 4/4/2023).      Past Social History     Reviewed,  reports that she has never smoked. She has never used smokeless tobacco. She reports that she does not currently use alcohol. She reports that she does not use drugs.    Family History     Reviewed, and family history is not on file.    Medication List     Current Outpatient Medications   Medication     acetaminophen (TYLENOL) 325 MG tablet     amLODIPine (NORVASC) 10 MG tablet     Apoaequorin (PREVAGEN) 10 MG CAPS     aspirin (ASA) 81 MG EC tablet     atorvastatin (LIPITOR) 80 MG tablet     hydrOXYzine (ATARAX) 10 MG tablet     Misc Natural Products (OSTEO BI-FLEX ADV TRIPLE ST PO)     multivitamin (CENTRUM SILVER) tablet     ondansetron (ZOFRAN ODT) 4 MG ODT tab     oxyCODONE (ROXICODONE) 5 MG tablet     polyethylene glycol (MIRALAX) 17 GM/Dose powder     senna-docusate (SENOKOT-S/PERICOLACE) 8.6-50 MG tablet     vitamin D3 (CHOLECALCIFEROL) 50 mcg (2000 units) tablet     No current facility-administered medications for this visit.      MED REC REQUIRED  Post Medication Reconciliation Status:   medications reconciled ;cont as above         Allergies     Allergies   Allergen Reactions     Shellfish Allergy Anaphylaxis     Shellfish-Derived Products Anaphylaxis     Fish-Derived Products        Review of Systems   A comprehensive review of 14 systems was done. Pertinent findings  "noted here and in history of present illness. All the rest negative.  Constitutional: Negative.  Negative for fever, chills, she has  activity change, appetite change and fatigue.   HENT: Negative for congestion and facial swelling.    Eyes: Negative for photophobia, redness and visual disturbance.   Respiratory: Negative for cough and chest tightness.    Cardiovascular: Negative for chest pain, palpitations and leg swelling.   Gastrointestinal: Negative for nausea, diarrhea, constipation, blood in stool and abdominal distention.   Genitourinary: Negative.    Musculoskeletal: Reports right-sided weakness from the stroke which she had recently  Pain in the hip is controlled.    Skin: Negative.    Neurological: Negative for dizziness, tremors, syncope, weakness, light-headedness and headaches.   Hematological: Does not bruise/bleed easily.   Psychiatric/Behavioral: Negative.        Physical Exam   /64   Pulse 83   Temp 97.4  F (36.3  C)   Resp 16   Ht 1.626 m (5' 4\")   Wt 60.3 kg (133 lb)   SpO2 92%   BMI 22.83 kg/m       Constitutional: Oriented to person, place, and time and appears well-developed.   HEENT:  Normocephalic and atraumatic.  Eyes: Conjunctivae and EOM are normal. Pupils are equal, round, and reactive to light. No discharge.  No scleral icterus. Nose normal. Mouth/Throat: Oropharynx is clear and moist. No oropharyngeal exudate.    NECK: Normal range of motion. Neck supple. No JVD present. No tracheal deviation present. No thyromegaly present.   CARDIOVASCULAR: Normal rate, regular rhythm and intact distal pulses.  Exam reveals no gallop and no friction rub.  Systolic murmur present.  PULMONARY: Effort normal and breath sounds normal. No respiratory distress.No Wheezing or rales.  ABDOMEN: Soft. Bowel sounds are normal. No distension and no mass.  There is no tenderness. There is no rebound and no guarding. No HSM.  MUSCULOSKELETAL: Normal range of motion. Mild kyphosis, no " tenderness.  Right hip incision intact with a Mepilex dressing  LYMPH NODES: Has no cervical, supraclavicular, axillary and groin adenopathy.   NEUROLOGICAL: Alert and oriented to person, place, and time. No cranial nerve deficit.  Normal muscle tone. Coordination normal.   Rt sided weakness from CVA  GENITOURINARY: Deferred exam.  SKIN: Skin is warm and dry. No rash noted. No erythema. No pallor.   Scalp laceration intact with staples noted on rt parietal scalp  EXTREMITIES: No cyanosis, no clubbing,  right greater than left lower extremity  edema. No Deformity.  PSYCHIATRIC: Normal mood, affect and behavior.      Lab Results     Recent Results (from the past 240 hour(s))   Basic metabolic panel    Collection Time: 04/03/23 11:31 AM   Result Value Ref Range    Sodium 132 (L) 136 - 145 mmol/L    Potassium 5.2 3.4 - 5.3 mmol/L    Chloride 97 (L) 98 - 107 mmol/L    Carbon Dioxide (CO2) 23 22 - 29 mmol/L    Anion Gap 12 7 - 15 mmol/L    Urea Nitrogen 26.5 (H) 8.0 - 23.0 mg/dL    Creatinine 0.75 0.51 - 0.95 mg/dL    Calcium 10.4 (H) 8.8 - 10.2 mg/dL    Glucose 171 (H) 70 - 99 mg/dL    GFR Estimate 78 >60 mL/min/1.73m2   Troponin T, High Sensitivity    Collection Time: 04/03/23 11:31 AM   Result Value Ref Range    Troponin T, High Sensitivity 11 <=14 ng/L   CBC with platelets and differential    Collection Time: 04/03/23 11:31 AM   Result Value Ref Range    WBC Count 11.2 (H) 4.0 - 11.0 10e3/uL    RBC Count 4.24 3.80 - 5.20 10e6/uL    Hemoglobin 11.9 11.7 - 15.7 g/dL    Hematocrit 35.2 35.0 - 47.0 %    MCV 83 78 - 100 fL    MCH 28.1 26.5 - 33.0 pg    MCHC 33.8 31.5 - 36.5 g/dL    RDW 13.8 10.0 - 15.0 %    Platelet Count 304 150 - 450 10e3/uL    % Neutrophils 89 %    % Lymphocytes 6 %    % Monocytes 5 %    % Eosinophils 0 %    % Basophils 0 %    % Immature Granulocytes 0 %    NRBCs per 100 WBC 0 <1 /100    Absolute Neutrophils 10.0 (H) 1.6 - 8.3 10e3/uL    Absolute Lymphocytes 0.7 (L) 0.8 - 5.3 10e3/uL    Absolute  Monocytes 0.6 0.0 - 1.3 10e3/uL    Absolute Eosinophils 0.0 0.0 - 0.7 10e3/uL    Absolute Basophils 0.0 0.0 - 0.2 10e3/uL    Absolute Immature Granulocytes 0.0 <=0.4 10e3/uL    Absolute NRBCs 0.0 10e3/uL   Extra Blue Top Tube    Collection Time: 04/03/23 11:32 AM   Result Value Ref Range    Hold Specimen JI    EKG 12-lead, tracing only    Collection Time: 04/03/23  1:01 PM   Result Value Ref Range    Systolic Blood Pressure  mmHg    Diastolic Blood Pressure  mmHg    Ventricular Rate 79 BPM    Atrial Rate 79 BPM    MA Interval 186 ms    QRS Duration 74 ms     ms    QTc 433 ms    P Axis 63 degrees    R AXIS 22 degrees    T Axis 112 degrees    Interpretation ECG       Sinus rhythm  Possible Left atrial enlargement  Cannot rule out Anterior infarct , age undetermined  Abnormal ECG  When compared with ECG of 30-DEC-2022 11:22,  Inverted T waves have replaced nonspecific T wave abnormality in Lateral leads  Confirmed by GENERATED REPORT, COMPUTER ([id number]),  Kennedi Pizano ([id number]) on 4/3/2023 2:09:00 PM     Troponin T, High Sensitivity    Collection Time: 04/03/23  8:23 PM   Result Value Ref Range    Troponin T, High Sensitivity 18 (H) <=14 ng/L   Magnesium    Collection Time: 04/03/23  8:23 PM   Result Value Ref Range    Magnesium 2.3 1.7 - 2.3 mg/dL   UA with Microscopic reflex to Culture    Collection Time: 04/04/23  1:18 AM    Specimen: Urine, Mc Catheter   Result Value Ref Range    Color Urine Light Yellow Colorless, Straw, Light Yellow, Yellow    Appearance Urine Clear Clear    Glucose Urine Negative Negative mg/dL    Bilirubin Urine Negative Negative    Ketones Urine Negative Negative mg/dL    Specific Gravity Urine 1.020 1.003 - 1.035    Blood Urine Negative Negative    pH Urine 6.5 5.0 - 7.0    Protein Albumin Urine Negative Negative mg/dL    Urobilinogen Urine Normal Normal, 2.0 mg/dL    Nitrite Urine Negative Negative    Leukocyte Esterase Urine Negative Negative    Mucus Urine Present (A) None Seen  /LPF    RBC Urine 1 <=2 /HPF    WBC Urine <1 <=5 /HPF    Squamous Epithelials Urine <1 <=1 /HPF   Glucose by meter    Collection Time: 04/04/23  2:21 AM   Result Value Ref Range    GLUCOSE BY METER POCT 100 (H) 70 - 99 mg/dL   Glucose by meter    Collection Time: 04/04/23  5:58 AM   Result Value Ref Range    GLUCOSE BY METER POCT 121 (H) 70 - 99 mg/dL   Basic metabolic panel    Collection Time: 04/04/23  6:32 AM   Result Value Ref Range    Sodium 135 (L) 136 - 145 mmol/L    Potassium 4.4 3.4 - 5.3 mmol/L    Chloride 104 98 - 107 mmol/L    Carbon Dioxide (CO2) 23 22 - 29 mmol/L    Anion Gap 8 7 - 15 mmol/L    Urea Nitrogen 16.3 8.0 - 23.0 mg/dL    Creatinine 0.76 0.51 - 0.95 mg/dL    Calcium 8.7 (L) 8.8 - 10.2 mg/dL    Glucose 115 (H) 70 - 99 mg/dL    GFR Estimate 76 >60 mL/min/1.73m2   CBC with platelets and differential    Collection Time: 04/04/23  6:32 AM   Result Value Ref Range    WBC Count 7.0 4.0 - 11.0 10e3/uL    RBC Count 3.50 (L) 3.80 - 5.20 10e6/uL    Hemoglobin 10.0 (L) 11.7 - 15.7 g/dL    Hematocrit 29.8 (L) 35.0 - 47.0 %    MCV 85 78 - 100 fL    MCH 28.6 26.5 - 33.0 pg    MCHC 33.6 31.5 - 36.5 g/dL    RDW 14.3 10.0 - 15.0 %    Platelet Count 224 150 - 450 10e3/uL    % Neutrophils 78 %    % Lymphocytes 12 %    % Monocytes 10 %    % Eosinophils 0 %    % Basophils 0 %    % Immature Granulocytes 0 %    NRBCs per 100 WBC 0 <1 /100    Absolute Neutrophils 5.4 1.6 - 8.3 10e3/uL    Absolute Lymphocytes 0.9 0.8 - 5.3 10e3/uL    Absolute Monocytes 0.7 0.0 - 1.3 10e3/uL    Absolute Eosinophils 0.0 0.0 - 0.7 10e3/uL    Absolute Basophils 0.0 0.0 - 0.2 10e3/uL    Absolute Immature Granulocytes 0.0 <=0.4 10e3/uL    Absolute NRBCs 0.0 10e3/uL   Vitamin D Deficiency    Collection Time: 04/04/23  6:32 AM   Result Value Ref Range    Vitamin D, Total (25-Hydroxy) 70 20 - 75 ug/L   Adult Type and Screen    Collection Time: 04/04/23  6:32 AM   Result Value Ref Range    ABO/RH(D) A POS     Antibody Screen Negative Negative     SPECIMEN EXPIRATION DATE [id number]    Echocardiogram Limited    Collection Time: 04/04/23 10:33 AM   Result Value Ref Range    LVEF  65-70%    Hemoglobin    Collection Time: 04/05/23  7:56 AM   Result Value Ref Range    Hemoglobin 8.5 (L) 11.7 - 15.7 g/dL   Glucose    Collection Time: 04/05/23  7:56 AM   Result Value Ref Range    Glucose 89 70 - 99 mg/dL   Potassium    Collection Time: 04/05/23  7:56 AM   Result Value Ref Range    Potassium 3.8 3.4 - 5.3 mmol/L   Hemoglobin    Collection Time: 04/06/23  7:25 AM   Result Value Ref Range    Hemoglobin 8.4 (L) 11.7 - 15.7 g/dL   Potassium    Collection Time: 04/06/23  7:25 AM   Result Value Ref Range    Potassium 3.6 3.4 - 5.3 mmol/L   Glucose    Collection Time: 04/06/23  7:25 AM   Result Value Ref Range    Glucose 100 (H) 70 - 99 mg/dL   Basic metabolic panel    Collection Time: 04/11/23  6:13 AM   Result Value Ref Range    Sodium 138 136 - 145 mmol/L    Potassium 4.6 3.4 - 5.3 mmol/L    Chloride 103 98 - 107 mmol/L    Carbon Dioxide (CO2) 24 22 - 29 mmol/L    Anion Gap 11 7 - 15 mmol/L    Urea Nitrogen 17.9 8.0 - 23.0 mg/dL    Creatinine 0.73 0.51 - 0.95 mg/dL    Calcium 9.1 8.8 - 10.2 mg/dL    Glucose 99 70 - 99 mg/dL    GFR Estimate 80 >60 mL/min/1.73m2   Magnesium    Collection Time: 04/11/23  6:13 AM   Result Value Ref Range    Magnesium 2.1 1.7 - 2.3 mg/dL   CBC with platelets and differential    Collection Time: 04/11/23  6:13 AM   Result Value Ref Range    WBC Count 6.3 4.0 - 11.0 10e3/uL    RBC Count 3.18 (L) 3.80 - 5.20 10e6/uL    Hemoglobin 8.8 (L) 11.7 - 15.7 g/dL    Hematocrit 28.6 (L) 35.0 - 47.0 %    MCV 90 78 - 100 fL    MCH 27.7 26.5 - 33.0 pg    MCHC 30.8 (L) 31.5 - 36.5 g/dL    RDW 14.7 10.0 - 15.0 %    Platelet Count 420 150 - 450 10e3/uL    % Neutrophils 54 %    % Lymphocytes 24 %    % Monocytes 14 %    % Eosinophils 6 %    % Basophils 1 %    % Immature Granulocytes 1 %    NRBCs per 100 WBC 0 <1 /100    Absolute Neutrophils 3.4 1.6 - 8.3  10e3/uL    Absolute Lymphocytes 1.5 0.8 - 5.3 10e3/uL    Absolute Monocytes 0.9 0.0 - 1.3 10e3/uL    Absolute Eosinophils 0.4 0.0 - 0.7 10e3/uL    Absolute Basophils 0.1 0.0 - 0.2 10e3/uL    Absolute Immature Granulocytes 0.1 <=0.4 10e3/uL    Absolute NRBCs 0.0 10e3/uL             Electronically signed by    Sonya Cespedes MD                             Sincerely,        BRANDON Blackman

## 2023-04-11 NOTE — TELEPHONE ENCOUNTER
[facility]     Provider: JACOBO White  Facility: [facility]  Facility Type:  TCU    Allergies   Allergen Reactions     Shellfish Allergy Anaphylaxis     Shellfish-Derived Products Anaphylaxis     Fish-Derived Products        Labs Reviewed by provider: Ultrasound       PCP updated    Malaika Gee RN

## 2023-04-20 RX ORDER — ACETAMINOPHEN 500 MG
1000 TABLET ORAL 3 TIMES DAILY
COMMUNITY

## 2023-04-20 RX ORDER — ACETAMINOPHEN 500 MG
1000 TABLET ORAL EVERY 8 HOURS PRN
COMMUNITY
Start: 2023-05-03

## 2023-04-21 ENCOUNTER — TRANSITIONAL CARE UNIT VISIT (OUTPATIENT)
Dept: GERIATRICS | Facility: CLINIC | Age: 83
End: 2023-04-21
Payer: COMMERCIAL

## 2023-04-21 VITALS
SYSTOLIC BLOOD PRESSURE: 137 MMHG | TEMPERATURE: 97.5 F | BODY MASS INDEX: 23.35 KG/M2 | RESPIRATION RATE: 18 BRPM | OXYGEN SATURATION: 96 % | HEART RATE: 74 BPM | WEIGHT: 136.8 LBS | HEIGHT: 64 IN | DIASTOLIC BLOOD PRESSURE: 62 MMHG

## 2023-04-21 DIAGNOSIS — R52 PAIN MANAGEMENT: ICD-10-CM

## 2023-04-21 DIAGNOSIS — S72.001A CLOSED FRACTURE OF NECK OF RIGHT FEMUR, INITIAL ENCOUNTER (H): Primary | ICD-10-CM

## 2023-04-21 DIAGNOSIS — R53.81 PHYSICAL DECONDITIONING: ICD-10-CM

## 2023-04-21 PROCEDURE — 99309 SBSQ NF CARE MODERATE MDM 30: CPT | Performed by: NURSE PRACTITIONER

## 2023-04-21 NOTE — LETTER
4/21/2023        RE: Inga Coats  [address]  [address]  [facility] [address]        [facility] GERIATRIC SERVICES  Chief Complaint   Patient presents with     MELANIE     [place] Medical Record Number:  [id number]  Place of Service where encounter took place:  [facility] ([facility]) [[id number]]  Code Status: Full Code     HISTORY:      HPI:  Inga Coats  is 83 year old (1940) undergoing physical  occupational  and speech therapy.who presented to the ER with her daughter for syncope/fall at home. Within the ED, she was found to have acute mildly communited fracture of the R femoral neck with anterior angulation. She underwent right hip hemiarthroplasty on 4/4/23.   Excerpted from records   Scalp laceration  Patient lives alone and reports she was walking out of her kitchen when the next thing she remembers she was getting up from the ground. She can't described any further details around the fall. It sounds like she went to bed normally however when she woke up she noticed her bathroom was full of blood.  She doesn't have any memory of the bleeding after her initial fall. After she fell again into her closet but this time she remembers the fall. She denies palpitations or feeling dizzy. She doesn't believe she had LOC but she is poor historian. She does remember R hip pain. Within the ED, EKG reviewed and normal. Troponin negative. Exam notable for head laceration. CT head negative for acute bleed.   * suspect that both falls mechanical in nature. She may have suffered mild concussion related to first fall. TTE w/o change from prior. Recent heart monitor demonstrated no arrhythmogenic concern for syncope. Tele here was without event by report. She does have residual deficits in ambulation related to recent CVA.      Today she was seen to review vital signs, labs, routine visit.  She denied chest pain shortness of breath cough congestion constipation or diarrhea.  She does have a  surgical incision right hip with a Aquaphor dressing and will follow-up with orthopedics on 4/24/2023.  She does move all extremities she was alert and oriented to name and situation.  Her scalp staples have been removed.  She is with recent CVA 2 months ago.  Labs reviewed hemoglobin up to 8.8 from 8.4    ALLERGIES:Shellfish allergy, Shellfish-derived products, and Fish-derived products    PAST MEDICAL HISTORY:   Past Medical History:   Diagnosis Date     Chronic kidney disease      CVA (cerebral vascular accident) (H)      Hypertension      S/P AVR      S/P CABG (coronary artery bypass graft)     x3     SVT (supraventricular tachycardia) (H)        PAST SURGICAL HISTORY:   has a past surgical history that includes Cardiac surgery and Open reduction internal fixation hip bipolar (Right, 4/4/2023).    FAMILY HISTORY: family history is not on file.    SOCIAL HISTORY:  reports that she has never smoked. She has never used smokeless tobacco. She reports that she does not currently use alcohol. She reports that she does not use drugs.    ROS:  Constitutional: Negative for activity change, appetite change, fatigue and fever.   HENT: Negative for congestion.    Respiratory: Negative for cough, shortness of breath and wheezing.    Cardiovascular: Negative for chest pain and leg swelling.   Gastrointestinal: Negative for abdominal distention, abdominal pain, constipation, diarrhea and nausea.   Genitourinary: Negative for dysuria.   Musculoskeletal: Negative for arthralgia. Negative for back pain.  Surgical incision right hip  Skin: Negative for color change and wound.  Right scalp staples have been removed   Neurological: Negative for dizziness.   Psychiatric/Behavioral: Negative for agitation, behavioral problems and confusion.     Physical Exam:  Constitutional:       Appearance: Patient is well-developed.   HENT:      Head: Normocephalic.  scalp staples removed  Eyes:      Conjunctiva/sclera: Conjunctivae normal.  "  Neck:      Musculoskeletal: Normal range of motion.   Cardiovascular:      Rate and Rhythm: Normal rate and regular rhythm.      Heart sounds: Normal heart sounds. No murmur.   Pulmonary:      Effort: No respiratory distress.      Breath sounds: Normal breath sounds. No wheezing or rales.   Abdominal:      General: Bowel sounds are normal. There is no distension.      Palpations: Abdomen is soft.      Tenderness: There is no abdominal tenderness.   Musculoskeletal:       Normal range of motion.    Surgical incision right hip covered with Aquacel dressing  Skin:General:        Skin is warm.   Neurological:         Mental Status: Patient is alert and oriented to person, and situation  Psychiatric:         Behavior: Behavior normal.     Vitals:/62   Pulse 74   Temp 97.5  F (36.4  C)   Resp 18   Ht 1.626 m (5' 4\")   Wt 62.1 kg (136 lb 12.8 oz)   SpO2 96%   BMI 23.48 kg/m   and Body mass index is 23.48 kg/m .    Lab/Diagnostic data:   No results found for this or any previous visit (from the past 240 hour(s)).    MEDICATIONS:     Review of your medicines          Accurate as of April 21, 2023 11:59 PM. If you have any questions, ask your nurse or doctor.            CONTINUE these medicines which may have CHANGED, or have new prescriptions. If we are uncertain of the size of tablets/capsules you have at home, strength may be listed as something that might have changed.      Dose / Directions   * acetaminophen 500 MG tablet  Commonly known as: TYLENOL  This may have changed: Another medication with the same name was removed. Continue taking this medication, and follow the directions you see here.  Changed by: Adamaris Whatley CNP      Dose: 1,000 mg  Start taking on: May 3, 2023  Take 1,000 mg by mouth every 8 hours as needed for mild pain  Refills: 0     * acetaminophen 500 MG tablet  Commonly known as: TYLENOL  This may have changed: Another medication with the same name was removed. Continue taking this " medication, and follow the directions you see here.  Changed by: Adamaris Whatley CNP      Dose: 1,000 mg  Take 1,000 mg by mouth 3 times daily  Refills: 0         * This list has 2 medication(s) that are the same as other medications prescribed for you. Read the directions carefully, and ask your doctor or other care provider to review them with you.            CONTINUE these medicines which have NOT CHANGED      Dose / Directions   amLODIPine 10 MG tablet  Commonly known as: NORVASC  Used for: Cerebrovascular accident (CVA), unspecified mechanism (H)      Dose: 5 mg  Take 0.5 tablets (5 mg) by mouth daily  Refills: 0     aspirin 81 MG EC tablet  Commonly known as: ASA  Indication: VTE Prophylaxis  Used for: Closed fracture of neck of right femur, initial encounter (H)      Dose: 81 mg  Take 1 tablet (81 mg) by mouth 2 times daily for 42 days  Quantity: 84 tablet  Refills: 0     atorvastatin 80 MG tablet  Commonly known as: LIPITOR  Used for: Cerebrovascular accident (CVA), unspecified mechanism (H)      Dose: 80 mg  Take 1 tablet (80 mg) by mouth every evening  Quantity: 30 tablet  Refills: 0     hydrOXYzine 10 MG tablet  Commonly known as: ATARAX  Used for: Closed fracture of neck of right femur, initial encounter (H)      Dose: 10 mg  Take 1 tablet (10 mg) by mouth every 6 hours as needed for other (adjuvant pain)  Quantity: 20 tablet  Refills: 0     multivitamin tablet      Dose: 1 tablet  Take 1 tablet by mouth daily  Refills: 0     ondansetron 4 MG ODT tab  Commonly known as: ZOFRAN ODT  Used for: Closed fracture of neck of right femur, initial encounter (H)      Dose: 4 mg  Take 1 tablet (4 mg) by mouth every 6 hours as needed for nausea or vomiting  Quantity: 5 tablet  Refills: 0     OSTEO BI-FLEX ADV TRIPLE ST PO      Dose: 2 capsule  Take 2 capsules by mouth daily  Refills: 0     oxyCODONE 5 MG tablet  Commonly known as: ROXICODONE  Used for: Closed fracture of neck of right femur, initial encounter (H)       Dose: 5 mg  Take 1 tablet (5 mg) by mouth every 4 hours as needed for moderate pain  Quantity: 20 tablet  Refills: 0     polyethylene glycol 17 GM/Dose powder  Commonly known as: MIRALAX  Used for: Closed fracture of neck of right femur, initial encounter (H)      Dose: 17 g  Take 17 g by mouth daily  Quantity: 510 g  Refills: 0     Prevagen 10 MG Caps  Generic drug: Apoaequorin      Dose: 1 capsule  Take 1 capsule by mouth daily  Refills: 0     senna-docusate 8.6-50 MG tablet  Commonly known as: SENOKOT-S/PERICOLACE  Used for: Closed fracture of neck of right femur, initial encounter (H)      Dose: 1 tablet  Take 1 tablet by mouth 2 times daily  Quantity: 21 tablet  Refills: 0     vitamin D3 50 mcg (2000 units) tablet  Commonly known as: CHOLECALCIFEROL      Dose: 1 tablet  Take 1 tablet by mouth daily  Refills: 0            ASSESSMENT/PLAN  Encounter Diagnoses   Name Primary?     Closed fracture of neck of right femur, initial encounter (H) Yes     Pain management      Physical deconditioning      Right femur fracture status post right hip hemiarthroplasty, follow-up with orthopedics pain management    Pain management Tylenol every 6 hours as needed, as needed oxycodone,     Physical deconditioning PT OT speech    History recent CVA approximately 2 months ago completed Plavix resumed atorvastatin asa    Anticoagulation management aspirin 81mg twice daily x42 days    Closed head injury right scalp staples removed    Hypertension on Norvasc    Electronically signed by: Adamaris Whatley CNP      Sincerely,        Adamaris Whatley CNP

## 2023-04-21 NOTE — PROGRESS NOTES
[facility] GERIATRIC SERVICES  Chief Complaint   Patient presents with     MELANIE     [place] Medical Record Number:  [id number]  Place of Service where encounter took place:  [facility] ([facility]) [[id number]]  Code Status: Full Code     HISTORY:      HPI:  Inga Coats  is 83 year old (1940) undergoing physical  occupational  and speech therapy.who presented to the ER with her daughter for syncope/fall at home. Within the ED, she was found to have acute mildly communited fracture of the R femoral neck with anterior angulation. She underwent right hip hemiarthroplasty on 4/4/23.   Excerpted from records   Scalp laceration  Patient lives alone and reports she was walking out of her kitchen when the next thing she remembers she was getting up from the ground. She can't described any further details around the fall. It sounds like she went to bed normally however when she woke up she noticed her bathroom was full of blood.  She doesn't have any memory of the bleeding after her initial fall. After she fell again into her closet but this time she remembers the fall. She denies palpitations or feeling dizzy. She doesn't believe she had LOC but she is poor historian. She does remember R hip pain. Within the ED, EKG reviewed and normal. Troponin negative. Exam notable for head laceration. CT head negative for acute bleed.   * suspect that both falls mechanical in nature. She may have suffered mild concussion related to first fall. TTE w/o change from prior. Recent heart monitor demonstrated no arrhythmogenic concern for syncope. Tele here was without event by report. She does have residual deficits in ambulation related to recent CVA.      Today she was seen to review vital signs, labs, routine visit.  She denied chest pain shortness of breath cough congestion constipation or diarrhea.  She does have a surgical incision right hip with a Aquaphor dressing and will follow-up with orthopedics on 4/24/2023.  She  does move all extremities she was alert and oriented to name and situation.  Her scalp staples have been removed.  She is with recent CVA 2 months ago.  Labs reviewed hemoglobin up to 8.8 from 8.4    ALLERGIES:Shellfish allergy, Shellfish-derived products, and Fish-derived products    PAST MEDICAL HISTORY:   Past Medical History:   Diagnosis Date     Chronic kidney disease      CVA (cerebral vascular accident) (H)      Hypertension      S/P AVR      S/P CABG (coronary artery bypass graft)     x3     SVT (supraventricular tachycardia) (H)        PAST SURGICAL HISTORY:   has a past surgical history that includes Cardiac surgery and Open reduction internal fixation hip bipolar (Right, 4/4/2023).    FAMILY HISTORY: family history is not on file.    SOCIAL HISTORY:  reports that she has never smoked. She has never used smokeless tobacco. She reports that she does not currently use alcohol. She reports that she does not use drugs.    ROS:  Constitutional: Negative for activity change, appetite change, fatigue and fever.   HENT: Negative for congestion.    Respiratory: Negative for cough, shortness of breath and wheezing.    Cardiovascular: Negative for chest pain and leg swelling.   Gastrointestinal: Negative for abdominal distention, abdominal pain, constipation, diarrhea and nausea.   Genitourinary: Negative for dysuria.   Musculoskeletal: Negative for arthralgia. Negative for back pain.  Surgical incision right hip  Skin: Negative for color change and wound.  Right scalp staples have been removed   Neurological: Negative for dizziness.   Psychiatric/Behavioral: Negative for agitation, behavioral problems and confusion.     Physical Exam:  Constitutional:       Appearance: Patient is well-developed.   HENT:      Head: Normocephalic.  scalp staples removed  Eyes:      Conjunctiva/sclera: Conjunctivae normal.   Neck:      Musculoskeletal: Normal range of motion.   Cardiovascular:      Rate and Rhythm: Normal rate and  "regular rhythm.      Heart sounds: Normal heart sounds. No murmur.   Pulmonary:      Effort: No respiratory distress.      Breath sounds: Normal breath sounds. No wheezing or rales.   Abdominal:      General: Bowel sounds are normal. There is no distension.      Palpations: Abdomen is soft.      Tenderness: There is no abdominal tenderness.   Musculoskeletal:       Normal range of motion.    Surgical incision right hip covered with Aquacel dressing  Skin:General:        Skin is warm.   Neurological:         Mental Status: Patient is alert and oriented to person, and situation  Psychiatric:         Behavior: Behavior normal.     Vitals:/62   Pulse 74   Temp 97.5  F (36.4  C)   Resp 18   Ht 1.626 m (5' 4\")   Wt 62.1 kg (136 lb 12.8 oz)   SpO2 96%   BMI 23.48 kg/m   and Body mass index is 23.48 kg/m .    Lab/Diagnostic data:   No results found for this or any previous visit (from the past 240 hour(s)).    MEDICATIONS:     Review of your medicines          Accurate as of April 21, 2023 11:59 PM. If you have any questions, ask your nurse or doctor.            CONTINUE these medicines which may have CHANGED, or have new prescriptions. If we are uncertain of the size of tablets/capsules you have at home, strength may be listed as something that might have changed.      Dose / Directions   * acetaminophen 500 MG tablet  Commonly known as: TYLENOL  This may have changed: Another medication with the same name was removed. Continue taking this medication, and follow the directions you see here.  Changed by: Adamaris Whatley CNP      Dose: 1,000 mg  Start taking on: May 3, 2023  Take 1,000 mg by mouth every 8 hours as needed for mild pain  Refills: 0     * acetaminophen 500 MG tablet  Commonly known as: TYLENOL  This may have changed: Another medication with the same name was removed. Continue taking this medication, and follow the directions you see here.  Changed by: Adamaris Whatley CNP      Dose: 1,000 mg  Take 1,000 " mg by mouth 3 times daily  Refills: 0         * This list has 2 medication(s) that are the same as other medications prescribed for you. Read the directions carefully, and ask your doctor or other care provider to review them with you.            CONTINUE these medicines which have NOT CHANGED      Dose / Directions   amLODIPine 10 MG tablet  Commonly known as: NORVASC  Used for: Cerebrovascular accident (CVA), unspecified mechanism (H)      Dose: 5 mg  Take 0.5 tablets (5 mg) by mouth daily  Refills: 0     aspirin 81 MG EC tablet  Commonly known as: ASA  Indication: VTE Prophylaxis  Used for: Closed fracture of neck of right femur, initial encounter (H)      Dose: 81 mg  Take 1 tablet (81 mg) by mouth 2 times daily for 42 days  Quantity: 84 tablet  Refills: 0     atorvastatin 80 MG tablet  Commonly known as: LIPITOR  Used for: Cerebrovascular accident (CVA), unspecified mechanism (H)      Dose: 80 mg  Take 1 tablet (80 mg) by mouth every evening  Quantity: 30 tablet  Refills: 0     hydrOXYzine 10 MG tablet  Commonly known as: ATARAX  Used for: Closed fracture of neck of right femur, initial encounter (H)      Dose: 10 mg  Take 1 tablet (10 mg) by mouth every 6 hours as needed for other (adjuvant pain)  Quantity: 20 tablet  Refills: 0     multivitamin tablet      Dose: 1 tablet  Take 1 tablet by mouth daily  Refills: 0     ondansetron 4 MG ODT tab  Commonly known as: ZOFRAN ODT  Used for: Closed fracture of neck of right femur, initial encounter (H)      Dose: 4 mg  Take 1 tablet (4 mg) by mouth every 6 hours as needed for nausea or vomiting  Quantity: 5 tablet  Refills: 0     OSTEO BI-FLEX ADV TRIPLE ST PO      Dose: 2 capsule  Take 2 capsules by mouth daily  Refills: 0     oxyCODONE 5 MG tablet  Commonly known as: ROXICODONE  Used for: Closed fracture of neck of right femur, initial encounter (H)      Dose: 5 mg  Take 1 tablet (5 mg) by mouth every 4 hours as needed for moderate pain  Quantity: 20 tablet  Refills:  0     polyethylene glycol 17 GM/Dose powder  Commonly known as: MIRALAX  Used for: Closed fracture of neck of right femur, initial encounter (H)      Dose: 17 g  Take 17 g by mouth daily  Quantity: 510 g  Refills: 0     Prevagen 10 MG Caps  Generic drug: Apoaequorin      Dose: 1 capsule  Take 1 capsule by mouth daily  Refills: 0     senna-docusate 8.6-50 MG tablet  Commonly known as: SENOKOT-S/PERICOLACE  Used for: Closed fracture of neck of right femur, initial encounter (H)      Dose: 1 tablet  Take 1 tablet by mouth 2 times daily  Quantity: 21 tablet  Refills: 0     vitamin D3 50 mcg (2000 units) tablet  Commonly known as: CHOLECALCIFEROL      Dose: 1 tablet  Take 1 tablet by mouth daily  Refills: 0            ASSESSMENT/PLAN  Encounter Diagnoses   Name Primary?     Closed fracture of neck of right femur, initial encounter (H) Yes     Pain management      Physical deconditioning      Right femur fracture status post right hip hemiarthroplasty, follow-up with orthopedics pain management    Pain management Tylenol every 6 hours as needed, as needed oxycodone,     Physical deconditioning PT OT speech    History recent CVA approximately 2 months ago completed Plavix resumed atorvastatin asa    Anticoagulation management aspirin 81mg twice daily x42 days    Closed head injury right scalp staples removed    Hypertension on Norvasc    Electronically signed by: Adamaris Whatley CNP

## 2023-04-25 NOTE — PROGRESS NOTES
[facility] GERIATRIC SERVICES  Chief Complaint   Patient presents with     Discharge Summary [facility] Medical Record Number:  [id number]  Place of Service where encounter took place:  No question data found.  Code Status: Full Code     HISTORY:      HPI:  Inga Coats  is 83 year old (1940) undergoing physical  occupational  and speech therapy.who presented to the ER with her daughter for syncope/fall at home. Within the ED, she was found to have acute mildly communited fracture of the R femoral neck with anterior angulation. She underwent right hip hemiarthroplasty on 4/4/23.   Excerpted from records   Scalp laceration  Patient lives alone and reports she was walking out of her kitchen when the next thing she remembers she was getting up from the ground. She can't described any further details around the fall. It sounds like she went to bed normally however when she woke up she noticed her bathroom was full of blood.  She doesn't have any memory of the bleeding after her initial fall. After she fell again into her closet but this time she remembers the fall. She denies palpitations or feeling dizzy. She doesn't believe she had LOC but she is poor historian. She does remember R hip pain. Within the ED, EKG reviewed and normal. Troponin negative. Exam notable for head laceration. CT head negative for acute bleed.   * suspect that both falls mechanical in nature. She may have suffered mild concussion related to first fall. TTE w/o change from prior. Recent heart monitor demonstrated no arrhythmogenic concern for syncope. Tele here was without event by report. She does have residual deficits in ambulation related to recent CVA.      Today she was seen to review vital signs, labs, routine visit and a face-to-face for discharge.  She will discharge to home on 4/28/2023 with current medications and treatments.  She will have home care services PT OT home health aide.  She denied chest pain  shortness of breath cough congestion constipation or diarrhea.  She does have a surgical incision right hip C/D/I.  She does move all extremities she was alert and oriented to name and situation.  Her scalp staples have been removed.  She is with recent CVA 2 months ago.  Labs reviewed last  hemoglobin up to 8.8.    ALLERGIES:Shellfish allergy, Shellfish-derived products, and Fish-derived products    PAST MEDICAL HISTORY:   Past Medical History:   Diagnosis Date     Chronic kidney disease      CVA (cerebral vascular accident) (H)      Hypertension      S/P AVR      S/P CABG (coronary artery bypass graft)     x3     SVT (supraventricular tachycardia) (H)        PAST SURGICAL HISTORY:   has a past surgical history that includes Cardiac surgery and Open reduction internal fixation hip bipolar (Right, 4/4/2023).    FAMILY HISTORY: family history is not on file.    SOCIAL HISTORY:  reports that she has never smoked. She has never used smokeless tobacco. She reports that she does not currently use alcohol. She reports that she does not use drugs.    ROS:  Constitutional: Negative for activity change, appetite change, fatigue and fever.   HENT: Negative for congestion.    Respiratory: Negative for cough, shortness of breath and wheezing.    Cardiovascular: Negative for chest pain and leg swelling.   Gastrointestinal: Negative for abdominal distention, abdominal pain, constipation, diarrhea and nausea.   Genitourinary: Negative for dysuria.   Musculoskeletal: Negative for arthralgia. Negative for back pain.  Surgical incision right hip  Skin: Negative for color change and wound.  Right scalp staples have been removed   Neurological: Negative for dizziness.   Psychiatric/Behavioral: Negative for agitation, behavioral problems and confusion.     Physical Exam:  Constitutional:       Appearance: Patient is well-developed.   HENT:      Head: Normocephalic.  scalp staples recently  removed  Eyes:      Conjunctiva/sclera:  Conjunctivae normal.   Neck:      Musculoskeletal: Normal range of motion.   Cardiovascular:      Rate and Rhythm: Normal rate and regular rhythm.      Heart sounds: Normal heart sounds. No murmur.   Pulmonary:      Effort: No respiratory distress.      Breath sounds: Normal breath sounds. No wheezing or rales.   Abdominal:      General: Bowel sounds are normal. There is no distension.      Palpations: Abdomen is soft.      Tenderness: There is no abdominal tenderness.   Musculoskeletal:       Normal range of motion.    Surgical incision right hip C/D/I VINICIO  Skin:General:        Skin is warm.   Neurological:         Mental Status: Patient is alert and oriented to person, and situation  Psychiatric:         Behavior: Behavior normal.     Vitals:BP (!) 143/78   Pulse 77   Resp 16   Wt 62.7 kg (138 lb 3.2 oz)   SpO2 98%   BMI 23.72 kg/m   and Body mass index is 23.72 kg/m .    Lab/Diagnostic data:   No results found for this or any previous visit (from the past 240 hour(s)).    MEDICATIONS:     Review of your medicines          Accurate as of April 25, 2023 12:51 PM. If you have any questions, ask your nurse or doctor.            CONTINUE these medicines which have NOT CHANGED      Dose / Directions   * acetaminophen 500 MG tablet  Commonly known as: TYLENOL      Dose: 1,000 mg  Start taking on: May 3, 2023  Take 1,000 mg by mouth every 8 hours as needed for mild pain  Refills: 0     * acetaminophen 500 MG tablet  Commonly known as: TYLENOL      Dose: 1,000 mg  Take 1,000 mg by mouth 3 times daily  Refills: 0     amLODIPine 10 MG tablet  Commonly known as: NORVASC  Used for: Cerebrovascular accident (CVA), unspecified mechanism (H)      Dose: 5 mg  Take 0.5 tablets (5 mg) by mouth daily  Refills: 0     aspirin 81 MG EC tablet  Commonly known as: ASA  Indication: VTE Prophylaxis  Used for: Closed fracture of neck of right femur, initial encounter (H)      Dose: 81 mg  Take 1 tablet (81 mg) by mouth 2 times daily  for 42 days  Quantity: 84 tablet  Refills: 0     atorvastatin 80 MG tablet  Commonly known as: LIPITOR  Used for: Cerebrovascular accident (CVA), unspecified mechanism (H)      Dose: 80 mg  Take 1 tablet (80 mg) by mouth every evening  Quantity: 30 tablet  Refills: 0     hydrOXYzine 10 MG tablet  Commonly known as: ATARAX  Used for: Closed fracture of neck of right femur, initial encounter (H)      Dose: 10 mg  Take 1 tablet (10 mg) by mouth every 6 hours as needed for other (adjuvant pain)  Quantity: 20 tablet  Refills: 0     multivitamin tablet      Dose: 1 tablet  Take 1 tablet by mouth daily  Refills: 0     ondansetron 4 MG ODT tab  Commonly known as: ZOFRAN ODT  Used for: Closed fracture of neck of right femur, initial encounter (H)      Dose: 4 mg  Take 1 tablet (4 mg) by mouth every 6 hours as needed for nausea or vomiting  Quantity: 5 tablet  Refills: 0     OSTEO BI-FLEX ADV TRIPLE ST PO      Dose: 2 capsule  Take 2 capsules by mouth daily  Refills: 0     oxyCODONE 5 MG tablet  Commonly known as: ROXICODONE  Used for: Closed fracture of neck of right femur, initial encounter (H)      Dose: 5 mg  Take 1 tablet (5 mg) by mouth every 4 hours as needed for moderate pain  Quantity: 20 tablet  Refills: 0     polyethylene glycol 17 GM/Dose powder  Commonly known as: MIRALAX  Used for: Closed fracture of neck of right femur, initial encounter (H)      Dose: 17 g  Take 17 g by mouth daily  Quantity: 510 g  Refills: 0     Prevagen 10 MG Caps  Generic drug: Apoaequorin      Dose: 1 capsule  Take 1 capsule by mouth daily  Refills: 0     senna-docusate 8.6-50 MG tablet  Commonly known as: SENOKOT-S/PERICOLACE  Used for: Closed fracture of neck of right femur, initial encounter (H)      Dose: 1 tablet  Take 1 tablet by mouth 2 times daily  Quantity: 21 tablet  Refills: 0     vitamin D3 50 mcg (2000 units) tablet  Commonly known as: CHOLECALCIFEROL      Dose: 1 tablet  Take 1 tablet by mouth daily  Refills: 0         *  This list has 2 medication(s) that are the same as other medications prescribed for you. Read the directions carefully, and ask your doctor or other care provider to review them with you.                ASSESSMENT/PLAN  Encounter Diagnoses   Name Primary?     Physical deconditioning Yes     Pain management      Closed fracture of neck of right femur, initial encounter (H)      Right femur fracture status post right hip hemiarthroplasty, follow-up with orthopedics pain management    Pain management Tylenol every 6 hours as needed, as needed oxycodone,     Physical deconditioning She will have home services PT/OT/HHA    History recent CVA approximately 2 months ago completed Plavix resumed atorvastatin asa    Anticoagulation management aspirin 81mg twice daily x42 days    Closed head injury right scalp staples removed    Hypertension on Norvasc      DISCHARGE PLAN/FACE TO FACE:  I certify that services are/were furnished while this patient was under the care of a physician and that a physician or an allowed non-physician practitioner (NPP), had a face-to-face encounter that meets the physician face-to-face encounter requirements. The encounter was in whole, or in part, related to the primary reason for home health. The patient is confined to his/her home and needs intermittent skilled nursing, physical therapy, speech-language pathology, or the continued need for occupational therapy. A plan of care has been established by a physician and is periodically reviewed by a physician.  Date of Face-to-Face Encounter: 4/26/2023    I certify that, based on my findings, the following services are medically necessary home health services: PT OT home health aide    My clinical findings support the need for the above skilled services because: PT OT for continued strength and endurance, home health aide to assist with activities of daily living    This patient is homebound because: She is deconditioned following recent right femur  fracture with surgical repair and will continue home therapy for strengthening.    The patient is, or has been, under my care and I have initiated the establishment of the plan of care. This patient will be followed by a physician who will periodically review the plan of care.    Schedule follow up visit with primary care provider within 7 days to reestablish care.    Electronically signed by: Adamaris Whatley CNP

## 2023-04-26 ENCOUNTER — DISCHARGE SUMMARY NURSING HOME (OUTPATIENT)
Dept: GERIATRICS | Facility: CLINIC | Age: 83
End: 2023-04-26
Payer: COMMERCIAL

## 2023-04-26 VITALS
HEART RATE: 77 BPM | DIASTOLIC BLOOD PRESSURE: 78 MMHG | HEIGHT: 64 IN | OXYGEN SATURATION: 98 % | BODY MASS INDEX: 23.6 KG/M2 | SYSTOLIC BLOOD PRESSURE: 143 MMHG | RESPIRATION RATE: 16 BRPM | WEIGHT: 138.2 LBS

## 2023-04-26 DIAGNOSIS — R52 PAIN MANAGEMENT: ICD-10-CM

## 2023-04-26 DIAGNOSIS — S72.001A CLOSED FRACTURE OF NECK OF RIGHT FEMUR, INITIAL ENCOUNTER (H): ICD-10-CM

## 2023-04-26 DIAGNOSIS — R53.81 PHYSICAL DECONDITIONING: Primary | ICD-10-CM

## 2023-04-26 PROCEDURE — 99316 NF DSCHRG MGMT 30 MIN+: CPT | Performed by: NURSE PRACTITIONER

## 2023-04-26 NOTE — LETTER
4/26/2023        RE: Inga Coats  [id number]  [address]  [facility] [id number]        [facility] GERIATRIC SERVICES  Chief Complaint   Patient presents with     Discharge Summary Nursing Home     [facility] Medical Record Number:  [id number]  Place of Service where encounter took place:  No question data found.  Code Status: Full Code     HISTORY:      HPI:  Inga Coats  is 83 year old (1940) undergoing physical  occupational  and speech therapy.who presented to the ER with her daughter for syncope/fall at home. Within the ED, she was found to have acute mildly communited fracture of the R femoral neck with anterior angulation. She underwent right hip hemiarthroplasty on 4/4/23.   Excerpted from records   Scalp laceration  Patient lives alone and reports she was walking out of her kitchen when the next thing she remembers she was getting up from the ground. She can't described any further details around the fall. It sounds like she went to bed normally however when she woke up she noticed her bathroom was full of blood.  She doesn't have any memory of the bleeding after her initial fall. After she fell again into her closet but this time she remembers the fall. She denies palpitations or feeling dizzy. She doesn't believe she had LOC but she is poor historian. She does remember R hip pain. Within the ED, EKG reviewed and normal. Troponin negative. Exam notable for head laceration. CT head negative for acute bleed.   * suspect that both falls mechanical in nature. She may have suffered mild concussion related to first fall. TTE w/o change from prior. Recent heart monitor demonstrated no arrhythmogenic concern for syncope. Tele here was without event by report. She does have residual deficits in ambulation related to recent CVA.      Today she was seen to review vital signs, labs, routine visit and a face-to-face for discharge.  She will discharge to home on 4/28/2023 with current medications  and treatments.  She will have home care services PT OT home health aide.  She denied chest pain shortness of breath cough congestion constipation or diarrhea.  She does have a surgical incision right hip C/D/I.  She does move all extremities she was alert and oriented to name and situation.  Her scalp staples have been removed.  She is with recent CVA 2 months ago.  Labs reviewed last  hemoglobin up to 8.8.    ALLERGIES:Shellfish allergy, Shellfish-derived products, and Fish-derived products    PAST MEDICAL HISTORY:   Past Medical History:   Diagnosis Date     Chronic kidney disease      CVA (cerebral vascular accident) (H)      Hypertension      S/P AVR      S/P CABG (coronary artery bypass graft)     x3     SVT (supraventricular tachycardia) (H)        PAST SURGICAL HISTORY:   has a past surgical history that includes Cardiac surgery and Open reduction internal fixation hip bipolar (Right, 4/4/2023).    FAMILY HISTORY: family history is not on file.    SOCIAL HISTORY:  reports that she has never smoked. She has never used smokeless tobacco. She reports that she does not currently use alcohol. She reports that she does not use drugs.    ROS:  Constitutional: Negative for activity change, appetite change, fatigue and fever.   HENT: Negative for congestion.    Respiratory: Negative for cough, shortness of breath and wheezing.    Cardiovascular: Negative for chest pain and leg swelling.   Gastrointestinal: Negative for abdominal distention, abdominal pain, constipation, diarrhea and nausea.   Genitourinary: Negative for dysuria.   Musculoskeletal: Negative for arthralgia. Negative for back pain.  Surgical incision right hip  Skin: Negative for color change and wound.  Right scalp staples have been removed   Neurological: Negative for dizziness.   Psychiatric/Behavioral: Negative for agitation, behavioral problems and confusion.     Physical Exam:  Constitutional:       Appearance: Patient is well-developed.   HENT:       Head: Normocephalic.  scalp staples recently  removed  Eyes:      Conjunctiva/sclera: Conjunctivae normal.   Neck:      Musculoskeletal: Normal range of motion.   Cardiovascular:      Rate and Rhythm: Normal rate and regular rhythm.      Heart sounds: Normal heart sounds. No murmur.   Pulmonary:      Effort: No respiratory distress.      Breath sounds: Normal breath sounds. No wheezing or rales.   Abdominal:      General: Bowel sounds are normal. There is no distension.      Palpations: Abdomen is soft.      Tenderness: There is no abdominal tenderness.   Musculoskeletal:       Normal range of motion.    Surgical incision right hip C/D/I VINICIO  Skin:General:        Skin is warm.   Neurological:         Mental Status: Patient is alert and oriented to person, and situation  Psychiatric:         Behavior: Behavior normal.     Vitals:BP (!) 143/78   Pulse 77   Resp 16   Wt 62.7 kg (138 lb 3.2 oz)   SpO2 98%   BMI 23.72 kg/m   and Body mass index is 23.72 kg/m .    Lab/Diagnostic data:   No results found for this or any previous visit (from the past 240 hour(s)).    MEDICATIONS:     Review of your medicines          Accurate as of April 25, 2023 12:51 PM. If you have any questions, ask your nurse or doctor.            CONTINUE these medicines which have NOT CHANGED      Dose / Directions   * acetaminophen 500 MG tablet  Commonly known as: TYLENOL      Dose: 1,000 mg  Start taking on: May 3, 2023  Take 1,000 mg by mouth every 8 hours as needed for mild pain  Refills: 0     * acetaminophen 500 MG tablet  Commonly known as: TYLENOL      Dose: 1,000 mg  Take 1,000 mg by mouth 3 times daily  Refills: 0     amLODIPine 10 MG tablet  Commonly known as: NORVASC  Used for: Cerebrovascular accident (CVA), unspecified mechanism (H)      Dose: 5 mg  Take 0.5 tablets (5 mg) by mouth daily  Refills: 0     aspirin 81 MG EC tablet  Commonly known as: ASA  Indication: VTE Prophylaxis  Used for: Closed fracture of neck of right  femur, initial encounter (H)      Dose: 81 mg  Take 1 tablet (81 mg) by mouth 2 times daily for 42 days  Quantity: 84 tablet  Refills: 0     atorvastatin 80 MG tablet  Commonly known as: LIPITOR  Used for: Cerebrovascular accident (CVA), unspecified mechanism (H)      Dose: 80 mg  Take 1 tablet (80 mg) by mouth every evening  Quantity: 30 tablet  Refills: 0     hydrOXYzine 10 MG tablet  Commonly known as: ATARAX  Used for: Closed fracture of neck of right femur, initial encounter (H)      Dose: 10 mg  Take 1 tablet (10 mg) by mouth every 6 hours as needed for other (adjuvant pain)  Quantity: 20 tablet  Refills: 0     multivitamin tablet      Dose: 1 tablet  Take 1 tablet by mouth daily  Refills: 0     ondansetron 4 MG ODT tab  Commonly known as: ZOFRAN ODT  Used for: Closed fracture of neck of right femur, initial encounter (H)      Dose: 4 mg  Take 1 tablet (4 mg) by mouth every 6 hours as needed for nausea or vomiting  Quantity: 5 tablet  Refills: 0     OSTEO BI-FLEX ADV TRIPLE ST PO      Dose: 2 capsule  Take 2 capsules by mouth daily  Refills: 0     oxyCODONE 5 MG tablet  Commonly known as: ROXICODONE  Used for: Closed fracture of neck of right femur, initial encounter (H)      Dose: 5 mg  Take 1 tablet (5 mg) by mouth every 4 hours as needed for moderate pain  Quantity: 20 tablet  Refills: 0     polyethylene glycol 17 GM/Dose powder  Commonly known as: MIRALAX  Used for: Closed fracture of neck of right femur, initial encounter (H)      Dose: 17 g  Take 17 g by mouth daily  Quantity: 510 g  Refills: 0     Prevagen 10 MG Caps  Generic drug: Apoaequorin      Dose: 1 capsule  Take 1 capsule by mouth daily  Refills: 0     senna-docusate 8.6-50 MG tablet  Commonly known as: SENOKOT-S/PERICOLACE  Used for: Closed fracture of neck of right femur, initial encounter (H)      Dose: 1 tablet  Take 1 tablet by mouth 2 times daily  Quantity: 21 tablet  Refills: 0     vitamin D3 50 mcg (2000 units) tablet  Commonly known as:  CHOLECALCIFEROL      Dose: 1 tablet  Take 1 tablet by mouth daily  Refills: 0         * This list has 2 medication(s) that are the same as other medications prescribed for you. Read the directions carefully, and ask your doctor or other care provider to review them with you.                ASSESSMENT/PLAN  Encounter Diagnoses   Name Primary?     Physical deconditioning Yes     Pain management      Closed fracture of neck of right femur, initial encounter (H)      Right femur fracture status post right hip hemiarthroplasty, follow-up with orthopedics pain management    Pain management Tylenol every 6 hours as needed, as needed oxycodone,     Physical deconditioning She will have home services PT/OT/HHA    History recent CVA approximately 2 months ago completed Plavix resumed atorvastatin asa    Anticoagulation management aspirin 81mg twice daily x42 days    Closed head injury right scalp staples removed    Hypertension on Norvasc      DISCHARGE PLAN/FACE TO FACE:  I certify that services are/were furnished while this patient was under the care of a physician and that a physician or an allowed non-physician practitioner (NPP), had a face-to-face encounter that meets the physician face-to-face encounter requirements. The encounter was in whole, or in part, related to the primary reason for home health. The patient is confined to his/her home and needs intermittent skilled nursing, physical therapy, speech-language pathology, or the continued need for occupational therapy. A plan of care has been established by a physician and is periodically reviewed by a physician.  Date of Face-to-Face Encounter: 4/26/2023    I certify that, based on my findings, the following services are medically necessary home health services: PT OT home health aide    My clinical findings support the need for the above skilled services because: PT OT for continued strength and endurance, home health aide to assist with activities of daily  living    This patient is homebound because: She is deconditioned following recent right femur fracture with surgical repair and will continue home therapy for strengthening.    The patient is, or has been, under my care and I have initiated the establishment of the plan of care. This patient will be followed by a physician who will periodically review the plan of care.    Schedule follow up visit with primary care provider within 7 days to reestablish care.    Electronically signed by: Adamaris Whatley CNP            Sincerely,        Adamaris Whatley CNP

## 2023-06-29 NOTE — PLAN OF CARE
Goal Outcome Evaluation:      Plan of Care Reviewed With: patient    Overall Patient Progress: improvingOverall Patient Progress: improving    Reason for Admission: L CVA & L M2 stenosis     Cognitive/Mentation: A/Ox 4.  Neuros/CMS: Intact ex RUE ataxia and drift with weakness- 4/5 strength and weakness in RLE- 4/5 strength.   VS: VSS. SBP goal<220.   Tele: NSR.  GI: BS active, + flatus. Continent.  : WDL. Continent.  Pulmonary: LS clear.  Pain: None.      Drains/Lines: 1 R PIV S.L. & 1 R PIV running continuous NS @75ml/hr  Skin: Intact.  Activity: A1 with GB/W.  Diet: MCHO and low NA diet with thin liquids. Takes pills whole.      Therapies recs: ARU  Discharge: Pending     Aggression Stoplight Tool: GREEN     End of shift summary: Increased weakness in RUE- Hospitalist and neuro stroke updated, pt started on IV fluids, continue to monitor- see neuro stroke note.          Tranexamic Acid Pregnancy And Lactation Text: It is unknown if this medication is safe during pregnancy or breast feeding.

## (undated) DEVICE — BONE CEMENT BIOPREP FEMORAL PREP PLUG INSERTER 0206-710-000

## (undated) DEVICE — SOL WATER IRRIG 1000ML BOTTLE 2F7114

## (undated) DEVICE — SU VICRYL 1 MO-4 18" J702D

## (undated) DEVICE — IMM PILLOW ABDUCT HIP MED

## (undated) DEVICE — DEVICE RETRIEVER HEWSON 71111579

## (undated) DEVICE — SU VICRYL 0 CT-2 27" J334H

## (undated) DEVICE — NDL BLUNT 18GA 1.5" W/O FILTER 305180

## (undated) DEVICE — SU NDL MAYO TROCAR MED 217003

## (undated) DEVICE — DRSG AQUACEL AG 3.5X9.75" HYDROFIBER 412011

## (undated) DEVICE — SU FIBERWIRE 2 38"  AR-7200

## (undated) DEVICE — BONE CLEANING TIP INTERPULSE  0210-010-000

## (undated) DEVICE — SUCTION IRR STRYKERFLOW II W/TIP 250-070-520

## (undated) DEVICE — BLADE SAW SAGITTAL STRK 18X90X1.19MM HD SYS 6 6118-119-090

## (undated) DEVICE — PACK TOTAL HIP W/POUCH SOP15HPFSM

## (undated) DEVICE — DRAPE IOBAN INCISE 23X17" 6650EZ

## (undated) DEVICE — DRAPE SHEET REV FOLD 3/4 9349

## (undated) DEVICE — NDL SPINAL 18GA 3.5" 405184

## (undated) DEVICE — PAD FOAM MCGUIRE KIT 0814-0150

## (undated) DEVICE — LINEN DRAPE 54X72" 5467

## (undated) DEVICE — STPL SKIN 35W ROTATING HEAD PRW35

## (undated) DEVICE — CEMENT PRESSURIZER FEMORAL CANAL MED 0206-546-000

## (undated) DEVICE — LINEN TOWEL PACK X5 5464

## (undated) DEVICE — SU VICRYL 2-0 CT-2 27" UND J269H

## (undated) RX ORDER — ONDANSETRON 2 MG/ML
INJECTION INTRAMUSCULAR; INTRAVENOUS
Status: DISPENSED
Start: 2023-04-04

## (undated) RX ORDER — HYDROMORPHONE HYDROCHLORIDE 1 MG/ML
INJECTION, SOLUTION INTRAMUSCULAR; INTRAVENOUS; SUBCUTANEOUS
Status: DISPENSED
Start: 2023-04-04

## (undated) RX ORDER — DEXAMETHASONE SODIUM PHOSPHATE 4 MG/ML
INJECTION, SOLUTION INTRA-ARTICULAR; INTRALESIONAL; INTRAMUSCULAR; INTRAVENOUS; SOFT TISSUE
Status: DISPENSED
Start: 2023-04-04

## (undated) RX ORDER — FENTANYL CITRATE 50 UG/ML
INJECTION, SOLUTION INTRAMUSCULAR; INTRAVENOUS
Status: DISPENSED
Start: 2023-04-04

## (undated) RX ORDER — PROPOFOL 10 MG/ML
INJECTION, EMULSION INTRAVENOUS
Status: DISPENSED
Start: 2023-04-04

## (undated) RX ORDER — VANCOMYCIN HYDROCHLORIDE 1 G/20ML
INJECTION, POWDER, LYOPHILIZED, FOR SOLUTION INTRAVENOUS
Status: DISPENSED
Start: 2023-04-04